# Patient Record
Sex: MALE | Race: WHITE | NOT HISPANIC OR LATINO | Employment: FULL TIME | URBAN - NONMETROPOLITAN AREA
[De-identification: names, ages, dates, MRNs, and addresses within clinical notes are randomized per-mention and may not be internally consistent; named-entity substitution may affect disease eponyms.]

---

## 2023-01-30 PROBLEM — M77.12 LATERAL EPICONDYLITIS OF LEFT ELBOW: Status: ACTIVE | Noted: 2023-01-30

## 2023-01-30 PROBLEM — R53.83 FATIGUE: Status: ACTIVE | Noted: 2023-01-30

## 2023-01-30 PROBLEM — R13.10 DYSPHAGIA: Status: ACTIVE | Noted: 2023-01-30

## 2023-01-30 PROBLEM — E53.8 VITAMIN B12 DEFICIENCY: Status: ACTIVE | Noted: 2023-01-30

## 2023-01-30 PROBLEM — F32.1 DEPRESSION, MAJOR, SINGLE EPISODE, MODERATE (MULTI): Status: ACTIVE | Noted: 2023-01-30

## 2023-01-30 PROBLEM — R39.11 URINARY HESITANCY: Status: ACTIVE | Noted: 2023-01-30

## 2023-01-30 PROBLEM — E11.9 DM2 (DIABETES MELLITUS, TYPE 2) (MULTI): Status: ACTIVE | Noted: 2023-01-30

## 2023-01-30 PROBLEM — R71.8 HIGH MEAN CORPUSCULAR HEMOGLOBIN CONCENTRATION (MCHC): Status: ACTIVE | Noted: 2023-01-30

## 2023-01-30 PROBLEM — E83.51 HYPOCALCEMIA: Status: ACTIVE | Noted: 2023-01-30

## 2023-01-30 PROBLEM — Z86.0100 HISTORY OF COLON POLYPS: Status: ACTIVE | Noted: 2023-01-30

## 2023-01-30 PROBLEM — J43.9 EMPHYSEMA/COPD (MULTI): Status: ACTIVE | Noted: 2023-01-30

## 2023-01-30 PROBLEM — R63.4 UNEXPLAINED WEIGHT LOSS: Status: ACTIVE | Noted: 2023-01-30

## 2023-01-30 PROBLEM — N40.0 BPH (BENIGN PROSTATIC HYPERPLASIA): Status: ACTIVE | Noted: 2023-01-30

## 2023-01-30 PROBLEM — G47.19 EXCESSIVE DAYTIME SLEEPINESS: Status: ACTIVE | Noted: 2023-01-30

## 2023-01-30 PROBLEM — D17.0 LIPOMA OF SKIN AND SUBCUTANEOUS TISSUE OF NECK: Status: ACTIVE | Noted: 2023-01-30

## 2023-01-30 PROBLEM — Z86.010 HISTORY OF COLON POLYPS: Status: ACTIVE | Noted: 2023-01-30

## 2023-01-30 PROBLEM — E03.9 HYPOTHYROIDISM: Status: ACTIVE | Noted: 2023-01-30

## 2023-01-30 PROBLEM — I25.10 CAD IN NATIVE ARTERY: Status: ACTIVE | Noted: 2023-01-30

## 2023-01-30 PROBLEM — E11.69 MIXED HYPERLIPIDEMIA DUE TO TYPE 2 DIABETES MELLITUS (MULTI): Status: ACTIVE | Noted: 2023-01-30

## 2023-01-30 PROBLEM — K21.9 GERD (GASTROESOPHAGEAL REFLUX DISEASE): Status: ACTIVE | Noted: 2023-01-30

## 2023-01-30 PROBLEM — F41.9 ANXIETY: Status: ACTIVE | Noted: 2023-01-30

## 2023-01-30 PROBLEM — E61.1 LOW IRON: Status: ACTIVE | Noted: 2023-01-30

## 2023-01-30 PROBLEM — E55.9 VITAMIN D DEFICIENCY: Status: ACTIVE | Noted: 2023-01-30

## 2023-01-30 PROBLEM — E78.2 MIXED HYPERLIPIDEMIA DUE TO TYPE 2 DIABETES MELLITUS (MULTI): Status: ACTIVE | Noted: 2023-01-30

## 2023-01-30 RX ORDER — NITROGLYCERIN 0.4 MG/1
TABLET SUBLINGUAL
COMMUNITY
Start: 2020-07-22

## 2023-01-30 RX ORDER — MONTELUKAST SODIUM 10 MG/1
1 TABLET ORAL NIGHTLY
COMMUNITY
Start: 2021-04-20

## 2023-01-30 RX ORDER — LANOLIN ALCOHOL/MO/W.PET/CERES
1 CREAM (GRAM) TOPICAL DAILY
COMMUNITY
Start: 2020-02-25 | End: 2023-05-18 | Stop reason: SDUPTHER

## 2023-01-30 RX ORDER — ISOSORBIDE MONONITRATE 60 MG/1
TABLET, EXTENDED RELEASE ORAL
COMMUNITY
Start: 2020-08-04

## 2023-01-30 RX ORDER — CLOPIDOGREL BISULFATE 75 MG/1
1 TABLET ORAL DAILY
COMMUNITY
End: 2024-02-01 | Stop reason: SDUPTHER

## 2023-01-30 RX ORDER — ATORVASTATIN CALCIUM 40 MG/1
1 TABLET, FILM COATED ORAL NIGHTLY
COMMUNITY
Start: 2020-02-25 | End: 2023-03-16 | Stop reason: SDUPTHER

## 2023-01-30 RX ORDER — LEVOTHYROXINE SODIUM 25 UG/1
1 TABLET ORAL DAILY
COMMUNITY
Start: 2020-02-25 | End: 2024-02-01 | Stop reason: SDUPTHER

## 2023-01-30 RX ORDER — OMEPRAZOLE 40 MG/1
CAPSULE, DELAYED RELEASE ORAL
COMMUNITY
Start: 2021-04-20

## 2023-01-30 RX ORDER — ERGOCALCIFEROL 1.25 MG/1
1 CAPSULE ORAL
COMMUNITY
Start: 2020-02-18 | End: 2023-03-16 | Stop reason: SDUPTHER

## 2023-01-30 RX ORDER — BLOOD SUGAR DIAGNOSTIC
STRIP MISCELLANEOUS
COMMUNITY
Start: 2020-08-31

## 2023-01-30 RX ORDER — METFORMIN HYDROCHLORIDE 1000 MG/1
1 TABLET ORAL EVERY 12 HOURS
COMMUNITY
Start: 2020-02-03 | End: 2024-04-18 | Stop reason: SDUPTHER

## 2023-01-30 RX ORDER — FLUOXETINE HYDROCHLORIDE 40 MG/1
CAPSULE ORAL
COMMUNITY
End: 2023-04-11 | Stop reason: ALTCHOICE

## 2023-03-13 LAB
ALANINE AMINOTRANSFERASE (SGPT) (U/L) IN SER/PLAS: 11 U/L (ref 10–52)
ALBUMIN (G/DL) IN SER/PLAS: 4.1 G/DL (ref 3.4–5)
ALKALINE PHOSPHATASE (U/L) IN SER/PLAS: 79 U/L (ref 33–136)
ANION GAP IN SER/PLAS: 8 MMOL/L (ref 10–20)
ASPARTATE AMINOTRANSFERASE (SGOT) (U/L) IN SER/PLAS: 12 U/L (ref 9–39)
BASOPHILS (10*3/UL) IN BLOOD BY AUTOMATED COUNT: 0.04 X10E9/L (ref 0–0.1)
BASOPHILS/100 LEUKOCYTES IN BLOOD BY AUTOMATED COUNT: 0.6 % (ref 0–2)
BILIRUBIN TOTAL (MG/DL) IN SER/PLAS: 0.5 MG/DL (ref 0–1.2)
CALCIDIOL (25 OH VITAMIN D3) (NG/ML) IN SER/PLAS: 26 NG/ML
CALCIUM (MG/DL) IN SER/PLAS: 8.9 MG/DL (ref 8.6–10.3)
CARBON DIOXIDE, TOTAL (MMOL/L) IN SER/PLAS: 30 MMOL/L (ref 21–32)
CHLORIDE (MMOL/L) IN SER/PLAS: 105 MMOL/L (ref 98–107)
CHOLESTEROL (MG/DL) IN SER/PLAS: 178 MG/DL (ref 0–199)
CHOLESTEROL IN HDL (MG/DL) IN SER/PLAS: 38 MG/DL
CHOLESTEROL/HDL RATIO: 4.7
COBALAMIN (VITAMIN B12) (PG/ML) IN SER/PLAS: 921 PG/ML (ref 211–911)
CREATININE (MG/DL) IN SER/PLAS: 0.75 MG/DL (ref 0.5–1.3)
EOSINOPHILS (10*3/UL) IN BLOOD BY AUTOMATED COUNT: 0.1 X10E9/L (ref 0–0.7)
EOSINOPHILS/100 LEUKOCYTES IN BLOOD BY AUTOMATED COUNT: 1.6 % (ref 0–6)
ERYTHROCYTE DISTRIBUTION WIDTH (RATIO) BY AUTOMATED COUNT: 12.3 % (ref 11.5–14.5)
ERYTHROCYTE MEAN CORPUSCULAR HEMOGLOBIN CONCENTRATION (G/DL) BY AUTOMATED: 34 G/DL (ref 32–36)
ERYTHROCYTE MEAN CORPUSCULAR VOLUME (FL) BY AUTOMATED COUNT: 93 FL (ref 80–100)
ERYTHROCYTES (10*6/UL) IN BLOOD BY AUTOMATED COUNT: 4.72 X10E12/L (ref 4.5–5.9)
ESTIMATED AVERAGE GLUCOSE FOR HBA1C: 137 MG/DL
FERRITIN (UG/LL) IN SER/PLAS: 110 UG/L (ref 20–300)
GFR MALE: >90 ML/MIN/1.73M2
GLUCOSE (MG/DL) IN SER/PLAS: 134 MG/DL (ref 74–99)
HEMATOCRIT (%) IN BLOOD BY AUTOMATED COUNT: 43.8 % (ref 41–52)
HEMOGLOBIN (G/DL) IN BLOOD: 14.9 G/DL (ref 13.5–17.5)
HEMOGLOBIN A1C/HEMOGLOBIN TOTAL IN BLOOD: 6.4 %
IMMATURE GRANULOCYTES/100 LEUKOCYTES IN BLOOD BY AUTOMATED COUNT: 0.3 % (ref 0–0.9)
IRON (UG/DL) IN SER/PLAS: 80 UG/DL (ref 35–150)
IRON BINDING CAPACITY (UG/DL) IN SER/PLAS: 304 UG/DL (ref 240–445)
IRON SATURATION (%) IN SER/PLAS: 26 % (ref 25–45)
LDL: 122 MG/DL (ref 0–99)
LEUKOCYTES (10*3/UL) IN BLOOD BY AUTOMATED COUNT: 6.2 X10E9/L (ref 4.4–11.3)
LYMPHOCYTES (10*3/UL) IN BLOOD BY AUTOMATED COUNT: 2.03 X10E9/L (ref 1.2–4.8)
LYMPHOCYTES/100 LEUKOCYTES IN BLOOD BY AUTOMATED COUNT: 33 % (ref 13–44)
MONOCYTES (10*3/UL) IN BLOOD BY AUTOMATED COUNT: 0.3 X10E9/L (ref 0.1–1)
MONOCYTES/100 LEUKOCYTES IN BLOOD BY AUTOMATED COUNT: 4.9 % (ref 2–10)
NEUTROPHILS (10*3/UL) IN BLOOD BY AUTOMATED COUNT: 3.67 X10E9/L (ref 1.2–7.7)
NEUTROPHILS/100 LEUKOCYTES IN BLOOD BY AUTOMATED COUNT: 59.6 % (ref 40–80)
PLATELETS (10*3/UL) IN BLOOD AUTOMATED COUNT: 305 X10E9/L (ref 150–450)
POTASSIUM (MMOL/L) IN SER/PLAS: 4 MMOL/L (ref 3.5–5.3)
PROSTATE SPECIFIC ANTIGEN,SCREEN: 2.49 NG/ML (ref 0–4)
PROTEIN TOTAL: 6 G/DL (ref 6.4–8.2)
SODIUM (MMOL/L) IN SER/PLAS: 139 MMOL/L (ref 136–145)
THYROTROPIN (MIU/L) IN SER/PLAS BY DETECTION LIMIT <= 0.05 MIU/L: 3.31 MIU/L (ref 0.44–3.98)
TRIGLYCERIDE (MG/DL) IN SER/PLAS: 92 MG/DL (ref 0–149)
UREA NITROGEN (MG/DL) IN SER/PLAS: 14 MG/DL (ref 6–23)
VLDL: 18 MG/DL (ref 0–40)

## 2023-03-16 ENCOUNTER — OFFICE VISIT (OUTPATIENT)
Dept: PRIMARY CARE | Facility: CLINIC | Age: 63
End: 2023-03-16
Payer: MEDICAID

## 2023-03-16 VITALS
SYSTOLIC BLOOD PRESSURE: 118 MMHG | BODY MASS INDEX: 26.81 KG/M2 | WEIGHT: 181 LBS | HEIGHT: 69 IN | HEART RATE: 76 BPM | DIASTOLIC BLOOD PRESSURE: 64 MMHG

## 2023-03-16 DIAGNOSIS — E61.1 LOW IRON: ICD-10-CM

## 2023-03-16 DIAGNOSIS — E03.9 HYPOTHYROIDISM, UNSPECIFIED TYPE: ICD-10-CM

## 2023-03-16 DIAGNOSIS — E11.69 MIXED HYPERLIPIDEMIA DUE TO TYPE 2 DIABETES MELLITUS (MULTI): ICD-10-CM

## 2023-03-16 DIAGNOSIS — E53.8 VITAMIN B12 DEFICIENCY: ICD-10-CM

## 2023-03-16 DIAGNOSIS — E55.9 VITAMIN D DEFICIENCY: ICD-10-CM

## 2023-03-16 DIAGNOSIS — E78.2 MIXED HYPERLIPIDEMIA DUE TO TYPE 2 DIABETES MELLITUS (MULTI): ICD-10-CM

## 2023-03-16 DIAGNOSIS — E83.51 HYPOCALCEMIA: ICD-10-CM

## 2023-03-16 DIAGNOSIS — F32.1 DEPRESSION, MAJOR, SINGLE EPISODE, MODERATE (MULTI): Primary | ICD-10-CM

## 2023-03-16 DIAGNOSIS — J43.9 PULMONARY EMPHYSEMA, UNSPECIFIED EMPHYSEMA TYPE (MULTI): ICD-10-CM

## 2023-03-16 DIAGNOSIS — F41.9 ANXIETY: ICD-10-CM

## 2023-03-16 PROCEDURE — 3044F HG A1C LEVEL LT 7.0%: CPT | Performed by: NURSE PRACTITIONER

## 2023-03-16 PROCEDURE — 4004F PT TOBACCO SCREEN RCVD TLK: CPT | Performed by: NURSE PRACTITIONER

## 2023-03-16 PROCEDURE — 3078F DIAST BP <80 MM HG: CPT | Performed by: NURSE PRACTITIONER

## 2023-03-16 PROCEDURE — 3074F SYST BP LT 130 MM HG: CPT | Performed by: NURSE PRACTITIONER

## 2023-03-16 PROCEDURE — 99214 OFFICE O/P EST MOD 30 MIN: CPT | Performed by: NURSE PRACTITIONER

## 2023-03-16 RX ORDER — ARIPIPRAZOLE 10 MG/1
10 TABLET ORAL DAILY
Qty: 90 TABLET | Refills: 0 | Status: SHIPPED | OUTPATIENT
Start: 2023-03-16 | End: 2023-04-11 | Stop reason: ALTCHOICE

## 2023-03-16 RX ORDER — ARIPIPRAZOLE 5 MG/1
1 TABLET ORAL DAILY
COMMUNITY
Start: 2023-02-02 | End: 2023-04-11 | Stop reason: ALTCHOICE

## 2023-03-16 RX ORDER — ATORVASTATIN CALCIUM 40 MG/1
40 TABLET, FILM COATED ORAL NIGHTLY
Qty: 90 TABLET | Refills: 3 | Status: SHIPPED | OUTPATIENT
Start: 2023-03-16 | End: 2024-02-01 | Stop reason: SDUPTHER

## 2023-03-16 RX ORDER — ERGOCALCIFEROL 1.25 MG/1
1 CAPSULE ORAL
Qty: 13 CAPSULE | Refills: 3 | Status: SHIPPED | OUTPATIENT
Start: 2023-03-16 | End: 2024-02-01 | Stop reason: SDUPTHER

## 2023-03-16 ASSESSMENT — ENCOUNTER SYMPTOMS
HEADACHES: 0
MYALGIAS: 0
SEIZURES: 0
JOINT SWELLING: 0
FLANK PAIN: 0
LIGHT-HEADEDNESS: 0
NERVOUS/ANXIOUS: 0
HEMATURIA: 0
BLOOD IN STOOL: 0
SPEECH DIFFICULTY: 0
UNEXPECTED WEIGHT CHANGE: 0
CONSTIPATION: 0
SLEEP DISTURBANCE: 0
ABDOMINAL DISTENTION: 0
TROUBLE SWALLOWING: 0
EYE PAIN: 0
DIARRHEA: 0
CHOKING: 0
FREQUENCY: 0
WEAKNESS: 0
DIFFICULTY URINATING: 0
WOUND: 0
DIZZINESS: 0
VOMITING: 0
NAUSEA: 0
BACK PAIN: 0
SHORTNESS OF BREATH: 0
NECK PAIN: 0
FEVER: 0
CHEST TIGHTNESS: 0
DYSURIA: 0
PHOTOPHOBIA: 0
APNEA: 0
NUMBNESS: 0
FATIGUE: 0
CONFUSION: 0
ABDOMINAL PAIN: 0
PALPITATIONS: 0
COUGH: 0
WHEEZING: 0
ARTHRALGIAS: 0
EYE REDNESS: 0
SORE THROAT: 0
FACIAL ASYMMETRY: 0
CHILLS: 0

## 2023-03-16 ASSESSMENT — PATIENT HEALTH QUESTIONNAIRE - PHQ9
8. MOVING OR SPEAKING SO SLOWLY THAT OTHER PEOPLE COULD HAVE NOTICED. OR THE OPPOSITE, BEING SO FIGETY OR RESTLESS THAT YOU HAVE BEEN MOVING AROUND A LOT MORE THAN USUAL: NEARLY EVERY DAY
2. FEELING DOWN, DEPRESSED OR HOPELESS: NEARLY EVERY DAY
7. TROUBLE CONCENTRATING ON THINGS, SUCH AS READING THE NEWSPAPER OR WATCHING TELEVISION: SEVERAL DAYS
6. FEELING BAD ABOUT YOURSELF - OR THAT YOU ARE A FAILURE OR HAVE LET YOURSELF OR YOUR FAMILY DOWN: SEVERAL DAYS
10. IF YOU CHECKED OFF ANY PROBLEMS, HOW DIFFICULT HAVE THESE PROBLEMS MADE IT FOR YOU TO DO YOUR WORK, TAKE CARE OF THINGS AT HOME, OR GET ALONG WITH OTHER PEOPLE: SOMEWHAT DIFFICULT
SUM OF ALL RESPONSES TO PHQ QUESTIONS 1-9: 11
4. FEELING TIRED OR HAVING LITTLE ENERGY: NOT AT ALL
9. THOUGHTS THAT YOU WOULD BE BETTER OFF DEAD, OR OF HURTING YOURSELF: NOT AT ALL
3. TROUBLE FALLING OR STAYING ASLEEP OR SLEEPING TOO MUCH: NOT AT ALL
5. POOR APPETITE OR OVEREATING: NOT AT ALL
1. LITTLE INTEREST OR PLEASURE IN DOING THINGS: NEARLY EVERY DAY
SUM OF ALL RESPONSES TO PHQ9 QUESTIONS 1 AND 2: 6

## 2023-03-16 NOTE — PROGRESS NOTES
"Subjective   Patient ID: Naveed Brown is a 62 y.o. male who presents for Follow-up (12 MONTH F/U WITH LABS. C/O INCREASED DEPRESSION - NO MOTIVATION TO DO ANYTHING. 22# WEIGHT LOSS SINCE 2/2/23 VISIT BUT PATIENT CLAIMS HE HAS BEEN EATING WELL. ).  HPI:  Presents today for [facility] LABS AND MED CHECK OF ABILIFY. HE IS DOING WELL ON MED AND DENIES SIDE EFFECTS. HE DOES FEEL LIKE HE COULD BENEFIT FROM A DOSAGE INCREASE. NO NEW COMPLAINTS    WT- FROM LAST VISIT THERE IS A 20LB WT DIFFERENCE BUT HE DENIES ANY KNOWN WT LOSS. INSTRUCTED TO MONITOR WT AT HOME AND RETURN SOONER IF HE IS LOSING WEIGHT  DM- HGA1C  6.4% COMPARED TO 6.2%. DENIES ANY SYMPTOMS OF HYPOGLYCEMIA. HAS NOT HAD THEIR EYE CHECKUP. DENIES ANY NEUROPATHY OR DIABETIC ULCER SYMPTOMS. WILL RECHECK IN 6 MONTHS  THYROID- STABLE  IRON- STABLE  CA+- STABLE  VIT D- LOW BUT STATES HE HAS MISSED SOME DOSES. MED SENT      Visit Vitals  /64   Pulse 76   Ht 1.753 m (5' 9\")   Wt 82.1 kg (181 lb)   BMI 26.73 kg/m²   Smoking Status Every Day   BSA 2 m²        Review of Systems   Constitutional:  Negative for chills, fatigue, fever and unexpected weight change.   HENT:  Negative for congestion, ear pain, sore throat and trouble swallowing.    Eyes:  Negative for photophobia, pain, redness and visual disturbance.   Respiratory:  Negative for apnea, cough, choking, chest tightness, shortness of breath and wheezing.    Cardiovascular:  Negative for chest pain, palpitations and leg swelling.   Gastrointestinal:  Negative for abdominal distention, abdominal pain, blood in stool, constipation, diarrhea, nausea and vomiting.   Genitourinary:  Negative for difficulty urinating, dysuria, flank pain, frequency, hematuria and urgency.   Musculoskeletal:  Negative for arthralgias, back pain, gait problem, joint swelling, myalgias and neck pain.   Skin:  Negative for rash and wound.   Neurological:  Negative for dizziness, seizures, syncope, facial asymmetry, speech difficulty, " weakness, light-headedness, numbness and headaches.   Psychiatric/Behavioral:  Negative for confusion, sleep disturbance and suicidal ideas. The patient is not nervous/anxious.        Objective   Component      Latest Ref Rng 3/13/2023   WBC      4.4 - 11.3 x10E9/L 6.2    RBC      4.50 - 5.90 x10E12/L 4.72    HEMOGLOBIN      13.5 - 17.5 g/dL 14.9    HEMATOCRIT      41.0 - 52.0 % 43.8    MCV      80 - 100 fL 93    MCHC      32.0 - 36.0 g/dL 34.0    Platelets      150 - 450 x10E9/L 305    RED CELL DISTRIBUTION WIDTH      11.5 - 14.5 % 12.3    Neutrophils %      40.0 - 80.0 % 59.6    Immature Granulocytes %, Automated      0.0 - 0.9 % 0.3    Lymphocytes %      13.0 - 44.0 % 33.0    Monocytes %      2.0 - 10.0 % 4.9    Eosinophils %      0.0 - 6.0 % 1.6    Basophils %      0.0 - 2.0 % 0.6    Neutrophils Absolute      1.20 - 7.70 x10E9/L 3.67    Lymphocytes Absolute      1.20 - 4.80 x10E9/L 2.03    Monocytes Absolute      0.10 - 1.00 x10E9/L 0.30    Eosinophils Absolute      0.00 - 0.70 x10E9/L 0.10    Basophils Absolute      0.00 - 0.10 x10E9/L 0.04    GLUCOSE      74 - 99 mg/dL 134 (H)    SODIUM      136 - 145 mmol/L 139    POTASSIUM      3.5 - 5.3 mmol/L 4.0    CHLORIDE      98 - 107 mmol/L 105    Bicarbonate      21 - 32 mmol/L 30    Anion Gap      10 - 20 mmol/L 8 (L)    Blood Urea Nitrogen      6 - 23 mg/dL 14    Creatinine      0.50 - 1.30 mg/dL 0.75    GFR MALE      >90 mL/min/1.73m2 >90    Calcium      8.6 - 10.3 mg/dL 8.9    Albumin      3.4 - 5.0 g/dL 4.1    Alkaline Phosphatase      33 - 136 U/L 79    Total Protein      6.4 - 8.2 g/dL 6.0 (L)    AST      9 - 39 U/L 12    Bilirubin Total      0.0 - 1.2 mg/dL 0.5    ALT      10 - 52 U/L 11    CHOLESTEROL      0 - 199 mg/dL 178    HDL CHOLESTEROL      mg/dL 38.0 !    Cholesterol/HDL Ratio 4.7    LDL      0 - 99 mg/dL 122 (H)    VLDL      0 - 40 mg/dL 18    TRIGLYCERIDES      0 - 149 mg/dL 92    IRON      35 - 150 ug/dL 80    TIBC      240 - 445 ug/dL 304    %  Saturation      25 - 45 % 26    Hemoglobin A1C      % 6.4 !    Estimated Average Glucose      MG/    FERRITIN      20 - 300 ug/L 110    Prostate Specific Antigen,Screen      0.00 - 4.00 ng/mL 2.49    Thyroid Stimulating Hormone      0.44 - 3.98 mIU/L 3.31    Vitamin D, 25-Hydroxy, Total      ng/mL 26 !    Vitamin B12      211 - 911 pg/mL 921 (H)       Legend:  (H) High  (L) Low  ! Abnormal  Physical Exam  Constitutional:       Appearance: Normal appearance. He is normal weight.   HENT:      Head: Normocephalic.   Eyes:      Extraocular Movements: Extraocular movements intact.      Conjunctiva/sclera: Conjunctivae normal.      Pupils: Pupils are equal, round, and reactive to light.   Cardiovascular:      Rate and Rhythm: Normal rate and regular rhythm.      Pulses: Normal pulses.      Heart sounds: Normal heart sounds.   Pulmonary:      Effort: Pulmonary effort is normal.      Breath sounds: Normal breath sounds.   Musculoskeletal:         General: Normal range of motion.      Cervical back: Normal range of motion.   Skin:     General: Skin is warm and dry.   Neurological:      General: No focal deficit present.      Mental Status: He is alert and oriented to person, place, and time.   Psychiatric:         Mood and Affect: Mood normal.         Behavior: Behavior normal.         Thought Content: Thought content normal.         Judgment: Judgment normal.            Assessment/Plan   Problem List Items Addressed This Visit          Endocrine/Metabolic    Mixed hyperlipidemia due to type 2 diabetes mellitus (CMS/HCC)    Relevant Medications    atorvastatin (Lipitor) 40 mg tablet    Vitamin D deficiency    Relevant Medications    ergocalciferol (Vitamin D-2) 1.25 MG (20612 UT) capsule       Other    Depression, major, single episode, moderate (CMS/HCC) - Primary    Relevant Medications    ARIPiprazole (Abilify) 10 mg tablet     INCREASE ABILIFY TO 10 MG     WE DISCUSSED MOST COMMON SIDE EFFECTS OF PRESCRIBED  MEDICATIONS. INDICATIONS, RISK, COMPLICATIONS, AND ALTERNATIVES OF MEDICATION/THERAPEUTICS WERE EXPLAINED AND DISCUSSED. PLEASE MONITOR CLOSELY FOR ANY UNTOWARD SIDE EFFECTS OR COMPLICATIONS OF MEDICATIONS. PATIENT IS STRONGLY ADVISED TO BE COMPLIANT WITH RECOMMENDATIONS. QUESTIONS AND CONCERNS WERE ADDRESSED. INSTRUCTED TO CALL, RETURN SOONER, OR GO TO THE ER,  IF SYMPTOMS PERSIST OR WORSEN. THEY VOICED UNDERSTANDING AND  DENIES FURTHER QUESTIONS AT THIS TIME.    TIME CODE  1. PREPARATION FOR PATIENT'S VISIT (REVIEWING CHART, CURRENT MEDICAL RECORDS, OUTSIDE HEALTH PROVIDER RECORDS, PREVIOUS HISTORY, EXAM, TEST, PROCEDURE, AND MEDICATIONS)  2. FACE TO FACE ENCOUNTER OBTAINING HISTORY FROM THE PATIENT/FAMILY/CAREGIVERS; PERFORMING EVALUATION AND EXAMINATION; ORDERING TESTS OR PROCEDURES; REFERRING AND COMMUNICATING WITH OTHER HEALTHCARE PROVIDERS; COUNSELING AND EDUCATION OF THE PATIENT/FAMILY/CAREGIVERS; INDEPENDENTLY INTERPRETING RESULTS (TESTS, LABS, PROCEDURES, IMAGING) AND COMMUNICATING AND EXPLAINING RESULTS TO THE PATIENT/FAMILY/CAREGIVERS  3. COORDINATION OF CARE; PREPARING AND PRINTING DISCHARGE INSTRUCTIONS AND ANY EDUCATIONAL MATERIAL FOR THE PATIENT/FAMILY/CAREGIVERS. DOCUMENTING CLINICAL INFORMATION IN THE ELECTRONIC MEDICAL RECORD   4. REVIEWING OARRS AS NEEDED    MDM  1) COMPLEXITY: MORE THAN 1 STABLE CHRONIC CONDITION ADDRESSED OR 1 ACUTE ILLNESS ADDRESSED   2)DATA: TESTS INTERPRETED AND OR ORDERED, TOOK INDEPENDENT HISTORY OR RECORDS REVIEWED  3)RISK: MODERATE RISK DUE TO NATURE OF MEDICAL CONDITIONS/COMORBIDITY OR MEDICATIONS ORDERED OR SURGICAL OR PROCEDURE REFERRAL    1 MONTH MED CHECK VIRTUAL

## 2023-04-03 ENCOUNTER — TELEPHONE (OUTPATIENT)
Dept: PRIMARY CARE | Facility: CLINIC | Age: 63
End: 2023-04-03
Payer: MEDICAID

## 2023-04-04 NOTE — TELEPHONE ENCOUNTER
Reason for Disposition  • Ear congestion present > 48 hours    Protocols used: EAR - CONGESTION-P-AH     PATIENT DOESN'T HAVE HIM LISTED FOR HIPAA CONTACT

## 2023-04-11 DIAGNOSIS — F32.1 DEPRESSION, MAJOR, SINGLE EPISODE, MODERATE (MULTI): Primary | ICD-10-CM

## 2023-04-11 RX ORDER — FLUOXETINE HYDROCHLORIDE 20 MG/1
20 CAPSULE ORAL DAILY
Qty: 90 CAPSULE | Refills: 3 | Status: SHIPPED | OUTPATIENT
Start: 2023-04-11 | End: 2023-07-20 | Stop reason: SDUPTHER

## 2023-04-11 RX ORDER — ARIPIPRAZOLE 10 MG/1
10 TABLET ORAL DAILY
Qty: 90 TABLET | Refills: 1 | Status: SHIPPED | OUTPATIENT
Start: 2023-04-11 | End: 2023-05-02 | Stop reason: ALTCHOICE

## 2023-04-14 ENCOUNTER — TELEMEDICINE (OUTPATIENT)
Dept: PRIMARY CARE | Facility: CLINIC | Age: 63
End: 2023-04-14
Payer: MEDICAID

## 2023-04-14 DIAGNOSIS — F41.9 ANXIETY: ICD-10-CM

## 2023-04-14 DIAGNOSIS — F32.1 DEPRESSION, MAJOR, SINGLE EPISODE, MODERATE (MULTI): Primary | ICD-10-CM

## 2023-04-14 PROCEDURE — 99212 OFFICE O/P EST SF 10 MIN: CPT | Performed by: NURSE PRACTITIONER

## 2023-04-14 ASSESSMENT — ENCOUNTER SYMPTOMS
SEIZURES: 0
ABDOMINAL DISTENTION: 0
NAUSEA: 0
SHORTNESS OF BREATH: 0
TROUBLE SWALLOWING: 0
CONSTIPATION: 0
SLEEP DISTURBANCE: 0
NERVOUS/ANXIOUS: 0
WOUND: 0
MYALGIAS: 0
EYE PAIN: 0
ARTHRALGIAS: 0
FLANK PAIN: 0
DIZZINESS: 0
WEAKNESS: 0
DYSURIA: 0
COUGH: 0
HEADACHES: 0
FREQUENCY: 0
CHILLS: 0
SPEECH DIFFICULTY: 0
WHEEZING: 0
FEVER: 0
FACIAL ASYMMETRY: 0
PALPITATIONS: 0
APNEA: 0
DIFFICULTY URINATING: 0
CHEST TIGHTNESS: 0
FATIGUE: 0
BACK PAIN: 0
VOMITING: 0
PHOTOPHOBIA: 0
EYE REDNESS: 0
UNEXPECTED WEIGHT CHANGE: 0
NECK PAIN: 0
HEMATURIA: 0
CONFUSION: 0
JOINT SWELLING: 0
NUMBNESS: 0
SORE THROAT: 0
DIARRHEA: 0
CHOKING: 0
BLOOD IN STOOL: 0
ABDOMINAL PAIN: 0

## 2023-04-14 NOTE — PROGRESS NOTES
Subjective   Patient ID: Naveed Brown is a 62 y.o. male who presents for Follow-up (TELEPHONE VISIT - 1 MONTH F/U MED CHECK DEPRESSION. PATIENT RECENTLY HAD MEDS ADJUSTED AND HAS ONLY BEEN TAKING X 2 DAYS NOW BUT STILL HASN'T NOTICED MUCH IMPROVEMENT REGARDING MOOD. ).    TELEPHONE  APPOINTMENT BEING PERFORMED DUE TO COVID-19 (CORONAVIRUS)    HPI:  Presents today for MED CHECK. HE JUST STARTED PROZAC 20 MG 2 DAYS. HE IS DOING WELL ON ABILIFY. DENIES SUICIDAL IDEATION. NO NEW COMPLAINTS         Visit Vitals  Smoking Status Every Day        Review of Systems   Constitutional:  Negative for chills, fatigue, fever and unexpected weight change.   HENT:  Negative for congestion, ear pain, sore throat and trouble swallowing.    Eyes:  Negative for photophobia, pain, redness and visual disturbance.   Respiratory:  Negative for apnea, cough, choking, chest tightness, shortness of breath and wheezing.    Cardiovascular:  Negative for chest pain, palpitations and leg swelling.   Gastrointestinal:  Negative for abdominal distention, abdominal pain, blood in stool, constipation, diarrhea, nausea and vomiting.   Genitourinary:  Negative for difficulty urinating, dysuria, flank pain, frequency, hematuria and urgency.   Musculoskeletal:  Negative for arthralgias, back pain, gait problem, joint swelling, myalgias and neck pain.   Skin:  Negative for rash and wound.   Neurological:  Negative for dizziness, seizures, syncope, facial asymmetry, speech difficulty, weakness, numbness and headaches.   Psychiatric/Behavioral:  Negative for confusion, sleep disturbance and suicidal ideas. The patient is not nervous/anxious.        Objective     Physical Exam  Psychiatric:         Mood and Affect: Mood normal.         Thought Content: Thought content normal.         Judgment: Judgment normal.            Assessment/Plan   Problem List Items Addressed This Visit          Other    Anxiety    Depression, major, single episode, moderate  (CMS/[facility]) - Primary     REFUSING SOONER FU APPT FOR MED CHECK. INSTRUCTED TO CALL IN 3 WEEKS WITH UPDATE    WE DISCUSSED MOST COMMON SIDE EFFECTS OF PRESCRIBED MEDICATIONS. INDICATIONS, RISK, COMPLICATIONS, AND ALTERNATIVES OF MEDICATION/THERAPEUTICS WERE EXPLAINED AND DISCUSSED. PLEASE MONITOR CLOSELY FOR ANY UNTOWARD SIDE EFFECTS OR COMPLICATIONS OF MEDICATIONS. PATIENT IS STRONGLY ADVISED TO BE COMPLIANT WITH RECOMMENDATIONS. QUESTIONS AND CONCERNS WERE ADDRESSED. INSTRUCTED TO CALL, RETURN SOONER, OR GO TO THE ER,  IF SYMPTOMS PERSIST OR WORSEN. THEY VOICED UNDERSTANDING AND  DENIES FURTHER QUESTIONS AT THIS TIME.    TIME CODE  1. PREPARATION FOR PATIENT'S VISIT (REVIEWING CHART, CURRENT MEDICAL RECORDS, OUTSIDE HEALTH PROVIDER RECORDS, PREVIOUS HISTORY, EXAM, TEST, PROCEDURE, AND MEDICATIONS)  2. FACE TO FACE ENCOUNTER OBTAINING HISTORY FROM THE PATIENT/FAMILY/CAREGIVERS; PERFORMING EVALUATION AND EXAMINATION; ORDERING TESTS OR PROCEDURES; REFERRING AND COMMUNICATING WITH OTHER HEALTHCARE PROVIDERS; COUNSELING AND EDUCATION OF THE PATIENT/FAMILY/CAREGIVERS; INDEPENDENTLY INTERPRETING RESULTS (TESTS, LABS, PROCEDURES, IMAGING) AND COMMUNICATING AND EXPLAINING RESULTS TO THE PATIENT/FAMILY/CAREGIVERS  3. COORDINATION OF CARE; PREPARING AND PRINTING DISCHARGE INSTRUCTIONS AND ANY EDUCATIONAL MATERIAL FOR THE PATIENT/FAMILY/CAREGIVERS. DOCUMENTING CLINICAL INFORMATION IN THE ELECTRONIC MEDICAL RECORD   4. REVIEWING OARRS AS NEEDED    MDM  1) COMPLEXITY: MORE THAN 1 STABLE CHRONIC CONDITION ADDRESSED OR 1 ACUTE ILLNESS ADDRESSED   2)DATA: TESTS INTERPRETED AND OR ORDERED, TOOK INDEPENDENT HISTORY OR RECORDS REVIEWED  3)RISK: MODERATE RISK DUE TO NATURE OF MEDICAL CONDITIONS/COMORBIDITY OR MEDICATIONS ORDERED OR SURGICAL OR PROCEDURE REFERRAL    6 MONTHS WITH LABS

## 2023-05-02 DIAGNOSIS — F32.1 DEPRESSION, MAJOR, SINGLE EPISODE, MODERATE (MULTI): Primary | ICD-10-CM

## 2023-05-02 RX ORDER — ARIPIPRAZOLE 20 MG/1
20 TABLET ORAL DAILY
Qty: 90 TABLET | Refills: 1 | Status: SHIPPED | OUTPATIENT
Start: 2023-05-02 | End: 2023-05-18 | Stop reason: ALTCHOICE

## 2023-05-04 ENCOUNTER — OFFICE VISIT (OUTPATIENT)
Dept: PRIMARY CARE | Facility: CLINIC | Age: 63
End: 2023-05-04
Payer: MEDICAID

## 2023-05-04 VITALS
HEIGHT: 69 IN | DIASTOLIC BLOOD PRESSURE: 75 MMHG | SYSTOLIC BLOOD PRESSURE: 120 MMHG | BODY MASS INDEX: 26.36 KG/M2 | HEART RATE: 61 BPM | WEIGHT: 178 LBS

## 2023-05-04 DIAGNOSIS — F32.1 DEPRESSION, MAJOR, SINGLE EPISODE, MODERATE (MULTI): Primary | ICD-10-CM

## 2023-05-04 DIAGNOSIS — F41.9 ANXIETY: ICD-10-CM

## 2023-05-04 PROCEDURE — 3074F SYST BP LT 130 MM HG: CPT | Performed by: NURSE PRACTITIONER

## 2023-05-04 PROCEDURE — 3078F DIAST BP <80 MM HG: CPT | Performed by: NURSE PRACTITIONER

## 2023-05-04 PROCEDURE — 3044F HG A1C LEVEL LT 7.0%: CPT | Performed by: NURSE PRACTITIONER

## 2023-05-04 PROCEDURE — 99213 OFFICE O/P EST LOW 20 MIN: CPT | Performed by: NURSE PRACTITIONER

## 2023-05-04 PROCEDURE — 4004F PT TOBACCO SCREEN RCVD TLK: CPT | Performed by: NURSE PRACTITIONER

## 2023-05-04 ASSESSMENT — ENCOUNTER SYMPTOMS
FACIAL ASYMMETRY: 0
HEMATURIA: 0
SEIZURES: 0
DIARRHEA: 0
NUMBNESS: 0
PHOTOPHOBIA: 0
WHEEZING: 0
VOMITING: 0
WOUND: 0
EYE REDNESS: 0
SPEECH DIFFICULTY: 0
SORE THROAT: 0
ABDOMINAL DISTENTION: 0
DIFFICULTY URINATING: 0
JOINT SWELLING: 0
CHOKING: 0
WEAKNESS: 0
BLOOD IN STOOL: 0
NECK PAIN: 0
UNEXPECTED WEIGHT CHANGE: 0
EYE PAIN: 0
COUGH: 0
NERVOUS/ANXIOUS: 0
SLEEP DISTURBANCE: 0
DIZZINESS: 0
TROUBLE SWALLOWING: 0
FATIGUE: 0
CONFUSION: 0
PALPITATIONS: 0
MYALGIAS: 0
ABDOMINAL PAIN: 0
CONSTIPATION: 0
APNEA: 0
FLANK PAIN: 0
CHEST TIGHTNESS: 0
FREQUENCY: 0
SHORTNESS OF BREATH: 0
CHILLS: 0
DYSURIA: 0
BACK PAIN: 0
FEVER: 0
NAUSEA: 0
HEADACHES: 0
ARTHRALGIAS: 0

## 2023-05-04 ASSESSMENT — PATIENT HEALTH QUESTIONNAIRE - PHQ9
3. TROUBLE FALLING OR STAYING ASLEEP OR SLEEPING TOO MUCH: NOT AT ALL
8. MOVING OR SPEAKING SO SLOWLY THAT OTHER PEOPLE COULD HAVE NOTICED. OR THE OPPOSITE, BEING SO FIGETY OR RESTLESS THAT YOU HAVE BEEN MOVING AROUND A LOT MORE THAN USUAL: NEARLY EVERY DAY
SUM OF ALL RESPONSES TO PHQ9 QUESTIONS 1 AND 2: 6
7. TROUBLE CONCENTRATING ON THINGS, SUCH AS READING THE NEWSPAPER OR WATCHING TELEVISION: NEARLY EVERY DAY
4. FEELING TIRED OR HAVING LITTLE ENERGY: NEARLY EVERY DAY
9. THOUGHTS THAT YOU WOULD BE BETTER OFF DEAD, OR OF HURTING YOURSELF: NEARLY EVERY DAY
6. FEELING BAD ABOUT YOURSELF - OR THAT YOU ARE A FAILURE OR HAVE LET YOURSELF OR YOUR FAMILY DOWN: MORE THAN HALF THE DAYS
2. FEELING DOWN, DEPRESSED OR HOPELESS: NEARLY EVERY DAY
10. IF YOU CHECKED OFF ANY PROBLEMS, HOW DIFFICULT HAVE THESE PROBLEMS MADE IT FOR YOU TO DO YOUR WORK, TAKE CARE OF THINGS AT HOME, OR GET ALONG WITH OTHER PEOPLE: EXTREMELY DIFFICULT
5. POOR APPETITE OR OVEREATING: NOT AT ALL
SUM OF ALL RESPONSES TO PHQ QUESTIONS 1-9: 20
1. LITTLE INTEREST OR PLEASURE IN DOING THINGS: NEARLY EVERY DAY

## 2023-05-04 NOTE — PROGRESS NOTES
"Subjective   Patient ID: Naveed Brown is a 62 y.o. male who presents for Follow-up (DISCUSS TESTING).      HPI:  Presents today TO DISCUSS A MRI BRAIN.  C/O DEPRESSION AND MOOD CHANGES THAT REMAINS X 1 YEAR  modifying factors consists of NONE associated symptoms consist of NO HA, DIZZINESS OR CHEST PAIN.  prior treatment consists of medication ABILIFY 20 MG X 2 DAYS AND PROZAC 20 MG X 3 WEEKS    Visit Vitals  /75   Pulse 61   Ht 1.753 m (5' 9\")   Wt 80.7 kg (178 lb)   BMI 26.29 kg/m²   Smoking Status Every Day   BSA 1.98 m²        Review of Systems   Constitutional:  Negative for chills, fatigue, fever and unexpected weight change.   HENT:  Negative for congestion, ear pain, sore throat and trouble swallowing.    Eyes:  Negative for photophobia, pain, redness and visual disturbance.   Respiratory:  Negative for apnea, cough, choking, chest tightness, shortness of breath and wheezing.    Cardiovascular:  Negative for chest pain, palpitations and leg swelling.   Gastrointestinal:  Negative for abdominal distention, abdominal pain, blood in stool, constipation, diarrhea, nausea and vomiting.   Genitourinary:  Negative for difficulty urinating, dysuria, flank pain, frequency, hematuria and urgency.   Musculoskeletal:  Negative for arthralgias, back pain, gait problem, joint swelling, myalgias and neck pain.   Skin:  Negative for rash and wound.   Neurological:  Negative for dizziness, seizures, syncope, facial asymmetry, speech difficulty, weakness, numbness and headaches.   Psychiatric/Behavioral:  Negative for confusion, sleep disturbance and suicidal ideas. The patient is not nervous/anxious.        Objective     Physical Exam  Constitutional:       Appearance: Normal appearance. He is normal weight.   HENT:      Head: Normocephalic.   Eyes:      Extraocular Movements: Extraocular movements intact.      Conjunctiva/sclera: Conjunctivae normal.      Pupils: Pupils are equal, round, and reactive to light. "   Cardiovascular:      Rate and Rhythm: Normal rate and regular rhythm.      Pulses: Normal pulses.      Heart sounds: Normal heart sounds.   Pulmonary:      Effort: Pulmonary effort is normal.      Breath sounds: Normal breath sounds.   Musculoskeletal:         General: Normal range of motion.      Cervical back: Normal range of motion.   Skin:     General: Skin is warm and dry.   Neurological:      General: No focal deficit present.      Mental Status: He is alert and oriented to person, place, and time.   Psychiatric:         Mood and Affect: Mood normal.         Behavior: Behavior normal.         Thought Content: Thought content normal.         Judgment: Judgment normal.            Assessment/Plan   Problem List Items Addressed This Visit          Other    Anxiety    Depression, major, single episode, moderate (CMS/HCC) - Primary       HE HAS HAD SOME IMPROVEMENT IN THE PAST 2 DAYS SINCE INCREASING HIS ABILIFY TO 20 MG. HE WANTS TO EAIT TO ORDER MRI BRAIN AT THIS TIME SINCE THERE IS IMPROVEMENT NOTED.     WE DISCUSSED MOST COMMON SIDE EFFECTS OF PRESCRIBED MEDICATIONS. INDICATIONS, RISK, COMPLICATIONS, AND ALTERNATIVES OF MEDICATION/THERAPEUTICS WERE EXPLAINED AND DISCUSSED. PLEASE MONITOR CLOSELY FOR ANY UNTOWARD SIDE EFFECTS OR COMPLICATIONS OF MEDICATIONS. PATIENT IS STRONGLY ADVISED TO BE COMPLIANT WITH RECOMMENDATIONS. QUESTIONS AND CONCERNS WERE ADDRESSED. INSTRUCTED TO CALL, RETURN SOONER, OR GO TO THE ER,  IF SYMPTOMS PERSIST OR WORSEN. THEY VOICED UNDERSTANDING AND  DENIES FURTHER QUESTIONS AT THIS TIME.    TIME CODE  1. PREPARATION FOR PATIENT'S VISIT (REVIEWING CHART, CURRENT MEDICAL RECORDS, OUTSIDE HEALTH PROVIDER RECORDS, PREVIOUS HISTORY, EXAM, TEST, PROCEDURE, AND MEDICATIONS)  2. FACE TO FACE ENCOUNTER OBTAINING HISTORY FROM THE PATIENT/FAMILY/CAREGIVERS; PERFORMING EVALUATION AND EXAMINATION; ORDERING TESTS OR PROCEDURES; REFERRING AND COMMUNICATING WITH OTHER HEALTHCARE PROVIDERS; COUNSELING  AND EDUCATION OF THE PATIENT/FAMILY/CAREGIVERS; INDEPENDENTLY INTERPRETING RESULTS (TESTS, LABS, PROCEDURES, IMAGING) AND COMMUNICATING AND EXPLAINING RESULTS TO THE PATIENT/FAMILY/CAREGIVERS  3. COORDINATION OF CARE; PREPARING AND PRINTING DISCHARGE INSTRUCTIONS AND ANY EDUCATIONAL MATERIAL FOR THE PATIENT/FAMILY/CAREGIVERS. DOCUMENTING CLINICAL INFORMATION IN THE ELECTRONIC MEDICAL RECORD   4. REVIEWING OARRS AS NEEDED    MDM  1) COMPLEXITY: MORE THAN 1 STABLE CHRONIC CONDITION ADDRESSED OR 1 ACUTE ILLNESS ADDRESSED   2)DATA: TESTS INTERPRETED AND OR ORDERED, TOOK INDEPENDENT HISTORY OR RECORDS REVIEWED  3)RISK: MODERATE RISK DUE TO NATURE OF MEDICAL CONDITIONS/COMORBIDITY OR MEDICATIONS ORDERED OR SURGICAL OR PROCEDURE REFERRAL    2 WEEKS MED CHECK VIRTUAL

## 2023-05-18 ENCOUNTER — TELEMEDICINE (OUTPATIENT)
Dept: PRIMARY CARE | Facility: CLINIC | Age: 63
End: 2023-05-18
Payer: MEDICAID

## 2023-05-18 DIAGNOSIS — F32.1 DEPRESSION, MAJOR, SINGLE EPISODE, MODERATE (MULTI): Primary | ICD-10-CM

## 2023-05-18 DIAGNOSIS — E53.8 VITAMIN B12 DEFICIENCY: ICD-10-CM

## 2023-05-18 DIAGNOSIS — F41.9 ANXIETY: ICD-10-CM

## 2023-05-18 PROCEDURE — 99214 OFFICE O/P EST MOD 30 MIN: CPT | Performed by: NURSE PRACTITIONER

## 2023-05-18 RX ORDER — QUETIAPINE FUMARATE 25 MG/1
25 TABLET, FILM COATED ORAL NIGHTLY
Qty: 90 TABLET | Refills: 0 | Status: SHIPPED | OUTPATIENT
Start: 2023-05-18 | End: 2023-06-01 | Stop reason: ALTCHOICE

## 2023-05-18 RX ORDER — ARIPIPRAZOLE 10 MG/1
10 TABLET ORAL DAILY
Qty: 90 TABLET | Refills: 3 | Status: SHIPPED | OUTPATIENT
Start: 2023-05-18 | End: 2023-10-19 | Stop reason: ALTCHOICE

## 2023-05-18 RX ORDER — LANOLIN ALCOHOL/MO/W.PET/CERES
1000 CREAM (GRAM) TOPICAL DAILY
Qty: 90 TABLET | Refills: 3 | Status: SHIPPED | OUTPATIENT
Start: 2023-05-18 | End: 2023-08-03 | Stop reason: ALTCHOICE

## 2023-05-18 ASSESSMENT — ENCOUNTER SYMPTOMS
SPEECH DIFFICULTY: 0
WHEEZING: 0
UNEXPECTED WEIGHT CHANGE: 0
ARTHRALGIAS: 0
COUGH: 0
NERVOUS/ANXIOUS: 0
VOMITING: 0
NUMBNESS: 0
FREQUENCY: 0
NECK PAIN: 0
CHEST TIGHTNESS: 0
ABDOMINAL DISTENTION: 0
DYSURIA: 0
MYALGIAS: 0
FLANK PAIN: 0
FACIAL ASYMMETRY: 0
APNEA: 0
BLOOD IN STOOL: 0
FEVER: 0
DIARRHEA: 0
SEIZURES: 0
SHORTNESS OF BREATH: 0
EYE REDNESS: 0
TROUBLE SWALLOWING: 0
JOINT SWELLING: 0
CHOKING: 0
CHILLS: 0
PALPITATIONS: 0
ABDOMINAL PAIN: 0
WEAKNESS: 0
DIZZINESS: 0
HEMATURIA: 0
WOUND: 0
SLEEP DISTURBANCE: 0
CONFUSION: 0
FATIGUE: 0
DIFFICULTY URINATING: 0
HEADACHES: 0
BACK PAIN: 0
PHOTOPHOBIA: 0
SORE THROAT: 0
EYE PAIN: 0
CONSTIPATION: 0
NAUSEA: 0

## 2023-05-18 NOTE — PROGRESS NOTES
Subjective   Patient ID: Naveed Brown is a 62 y.o. male who presents for Follow-up (DEPRESSION, PT STATES HE'S DOING WELL).    VIRTUAL APPOINTMENT BEING PERFORMED DUE TO COVID-19 (CORONAVIRUS)    HPI:  Presents today for MED CHECK  AFTER INCREASING ABILIFY FROM 10 MG TO 20 MG. HE STATES HE FEELS RESTLESS SINCE THE INCREASE. NO NEW COMPLAINTS   Visit Vitals  Smoking Status Every Day        Review of Systems   Constitutional:  Negative for chills, fatigue, fever and unexpected weight change.   HENT:  Negative for congestion, ear pain, sore throat and trouble swallowing.    Eyes:  Negative for photophobia, pain, redness and visual disturbance.   Respiratory:  Negative for apnea, cough, choking, chest tightness, shortness of breath and wheezing.    Cardiovascular:  Negative for chest pain, palpitations and leg swelling.   Gastrointestinal:  Negative for abdominal distention, abdominal pain, blood in stool, constipation, diarrhea, nausea and vomiting.   Genitourinary:  Negative for difficulty urinating, dysuria, flank pain, frequency, hematuria and urgency.   Musculoskeletal:  Negative for arthralgias, back pain, gait problem, joint swelling, myalgias and neck pain.   Skin:  Negative for rash and wound.   Neurological:  Negative for dizziness, seizures, syncope, facial asymmetry, speech difficulty, weakness, numbness and headaches.   Psychiatric/Behavioral:  Negative for confusion, sleep disturbance and suicidal ideas. The patient is not nervous/anxious.        Objective     Physical Exam  Psychiatric:         Mood and Affect: Mood normal.         Thought Content: Thought content normal.         Judgment: Judgment normal.            Assessment/Plan   Problem List Items Addressed This Visit          Endocrine/Metabolic    Vitamin B12 deficiency    Relevant Medications    cyanocobalamin (Vitamin B-12) 1,000 mcg tablet       Other    Anxiety    Relevant Medications    QUEtiapine (SEROquel) 25 mg tablet    ARIPiprazole  (Abilify) 10 mg tablet    Depression, major, single episode, moderate (CMS/HCC) - Primary    Relevant Medications    QUEtiapine (SEROquel) 25 mg tablet    ARIPiprazole (Abilify) 10 mg tablet     DECREASE ABILIFY BACK TO 10 MG AND START SEROQUEL 25 MG AT BEDTIME.     WE DISCUSSED MOST COMMON SIDE EFFECTS OF PRESCRIBED MEDICATIONS. INDICATIONS, RISK, COMPLICATIONS, AND ALTERNATIVES OF MEDICATION/THERAPEUTICS WERE EXPLAINED AND DISCUSSED. PLEASE MONITOR CLOSELY FOR ANY UNTOWARD SIDE EFFECTS OR COMPLICATIONS OF MEDICATIONS. PATIENT IS STRONGLY ADVISED TO BE COMPLIANT WITH RECOMMENDATIONS. QUESTIONS AND CONCERNS WERE ADDRESSED. INSTRUCTED TO CALL, RETURN SOONER, OR GO TO THE ER,  IF SYMPTOMS PERSIST OR WORSEN. THEY VOICED UNDERSTANDING AND  DENIES FURTHER QUESTIONS AT THIS TIME.    TIME CODE  1. PREPARATION FOR PATIENT'S VISIT (REVIEWING CHART, CURRENT MEDICAL RECORDS, OUTSIDE HEALTH PROVIDER RECORDS, PREVIOUS HISTORY, EXAM, TEST, PROCEDURE, AND MEDICATIONS)  2. FACE TO FACE ENCOUNTER OBTAINING HISTORY FROM THE PATIENT/FAMILY/CAREGIVERS; PERFORMING EVALUATION AND EXAMINATION; ORDERING TESTS OR PROCEDURES; REFERRING AND COMMUNICATING WITH OTHER HEALTHCARE PROVIDERS; COUNSELING AND EDUCATION OF THE PATIENT/FAMILY/CAREGIVERS; INDEPENDENTLY INTERPRETING RESULTS (TESTS, LABS, PROCEDURES, IMAGING) AND COMMUNICATING AND EXPLAINING RESULTS TO THE PATIENT/FAMILY/CAREGIVERS  3. COORDINATION OF CARE; PREPARING AND PRINTING DISCHARGE INSTRUCTIONS AND ANY EDUCATIONAL MATERIAL FOR THE PATIENT/FAMILY/CAREGIVERS. DOCUMENTING CLINICAL INFORMATION IN THE ELECTRONIC MEDICAL RECORD   4. REVIEWING OARRS AS NEEDED    MDM  1) COMPLEXITY: MORE THAN 1 STABLE CHRONIC CONDITION ADDRESSED OR 1 ACUTE ILLNESS ADDRESSED   2)DATA: TESTS INTERPRETED AND OR ORDERED, TOOK INDEPENDENT HISTORY OR RECORDS REVIEWED  3)RISK: MODERATE RISK DUE TO NATURE OF MEDICAL CONDITIONS/COMORBIDITY OR MEDICATIONS ORDERED OR SURGICAL OR PROCEDURE REFERRAL    6/1/23   PM VIRTUAL MED CHECK

## 2023-06-01 ENCOUNTER — TELEMEDICINE (OUTPATIENT)
Dept: PRIMARY CARE | Facility: CLINIC | Age: 63
End: 2023-06-01
Payer: MEDICAID

## 2023-06-01 DIAGNOSIS — F32.1 DEPRESSION, MAJOR, SINGLE EPISODE, MODERATE (MULTI): ICD-10-CM

## 2023-06-01 DIAGNOSIS — F41.9 ANXIETY: ICD-10-CM

## 2023-06-01 PROCEDURE — 99214 OFFICE O/P EST MOD 30 MIN: CPT | Performed by: NURSE PRACTITIONER

## 2023-06-01 RX ORDER — QUETIAPINE FUMARATE 50 MG/1
50 TABLET, FILM COATED ORAL NIGHTLY
Qty: 90 TABLET | Refills: 0 | Status: SHIPPED | OUTPATIENT
Start: 2023-06-01 | End: 2023-06-15 | Stop reason: ALTCHOICE

## 2023-06-01 ASSESSMENT — ENCOUNTER SYMPTOMS
NERVOUS/ANXIOUS: 1
FACIAL ASYMMETRY: 0
HEMATURIA: 0
VOMITING: 0
WEAKNESS: 0
SPEECH DIFFICULTY: 0
FREQUENCY: 0
HEADACHES: 0
EYE REDNESS: 0
SEIZURES: 0
BACK PAIN: 0
UNEXPECTED WEIGHT CHANGE: 0
DYSPHORIC MOOD: 1
CHILLS: 0
WHEEZING: 0
PALPITATIONS: 0
FATIGUE: 0
PHOTOPHOBIA: 0
DIARRHEA: 0
SHORTNESS OF BREATH: 0
CONFUSION: 0
APNEA: 0
ABDOMINAL PAIN: 0
DIFFICULTY URINATING: 0
FLANK PAIN: 0
NAUSEA: 0
NECK PAIN: 0
DYSURIA: 0
ABDOMINAL DISTENTION: 0
CHOKING: 0
SORE THROAT: 0
WOUND: 0
JOINT SWELLING: 0
COUGH: 0
NUMBNESS: 0
CHEST TIGHTNESS: 0
ARTHRALGIAS: 0
DIZZINESS: 0
SLEEP DISTURBANCE: 0
TROUBLE SWALLOWING: 0
EYE PAIN: 0
FEVER: 0
MYALGIAS: 0
BLOOD IN STOOL: 0
CONSTIPATION: 0

## 2023-06-01 NOTE — PROGRESS NOTES
Subjective   Patient ID: Naveed Brown is a 62 y.o. male who presents for Follow-up (MED CHECK ).    VIRTUAL APPOINTMENT BEING PERFORMED DUE TO COVID-19 (CORONAVIRUS)    HPI:  Presents today for MED CHECK OF SEROQUEL. HE IS TOLERATING MED WELL BUT STATES HE FEELS NO DIFFERENT IN HIS MOOD. C/O DEPRESSION THAT REMAINS  modifying factors consists of NO SUICIDAL IDEATION  associated symptoms consist of ANXIETY. FEELS DOWN. NO MOTIVATION  prior treatment consists of medication PROZAC AND SEROQUEL     Visit Vitals  Smoking Status Every Day        Review of Systems   Constitutional:  Negative for chills, fatigue, fever and unexpected weight change.   HENT:  Negative for congestion, ear pain, sore throat and trouble swallowing.    Eyes:  Negative for photophobia, pain, redness and visual disturbance.   Respiratory:  Negative for apnea, cough, choking, chest tightness, shortness of breath and wheezing.    Cardiovascular:  Negative for chest pain, palpitations and leg swelling.   Gastrointestinal:  Negative for abdominal distention, abdominal pain, blood in stool, constipation, diarrhea, nausea and vomiting.   Genitourinary:  Negative for difficulty urinating, dysuria, flank pain, frequency, hematuria and urgency.   Musculoskeletal:  Negative for arthralgias, back pain, gait problem, joint swelling, myalgias and neck pain.   Skin:  Negative for rash and wound.   Neurological:  Negative for dizziness, seizures, syncope, facial asymmetry, speech difficulty, weakness, numbness and headaches.   Psychiatric/Behavioral:  Positive for dysphoric mood. Negative for confusion, sleep disturbance and suicidal ideas. The patient is nervous/anxious.        Objective     Physical Exam  Psychiatric:         Mood and Affect: Mood normal.         Thought Content: Thought content normal.         Judgment: Judgment normal.            Assessment/Plan   Problem List Items Addressed This Visit          Other    Anxiety    Relevant Medications     QUEtiapine (Seroquel) 50 mg tablet    Other Relevant Orders    Referral to Psychiatry    Depression, major, single episode, moderate (CMS/HCC)    Relevant Medications    QUEtiapine (Seroquel) 50 mg tablet    Other Relevant Orders    Referral to Psychiatry       I WILL REFER TO CRISTINA. INCREASE SEROQUEL TO 50 MG. INSTRUCTED TO INCREASE TO 75 MG IN 1 WEEK IF TOLERATING WELL.     WE DISCUSSED MOST COMMON SIDE EFFECTS OF PRESCRIBED MEDICATIONS. INDICATIONS, RISK, COMPLICATIONS, AND ALTERNATIVES OF MEDICATION/THERAPEUTICS WERE EXPLAINED AND DISCUSSED. PLEASE MONITOR CLOSELY FOR ANY UNTOWARD SIDE EFFECTS OR COMPLICATIONS OF MEDICATIONS. PATIENT IS STRONGLY ADVISED TO BE COMPLIANT WITH RECOMMENDATIONS. QUESTIONS AND CONCERNS WERE ADDRESSED. INSTRUCTED TO CALL, RETURN SOONER, OR GO TO THE ER,  IF SYMPTOMS PERSIST OR WORSEN. THEY VOICED UNDERSTANDING AND  DENIES FURTHER QUESTIONS AT THIS TIME.    TIME CODE  1. PREPARATION FOR PATIENT'S VISIT (REVIEWING CHART, CURRENT MEDICAL RECORDS, OUTSIDE HEALTH PROVIDER RECORDS, PREVIOUS HISTORY, EXAM, TEST, PROCEDURE, AND MEDICATIONS)  2. FACE TO FACE ENCOUNTER OBTAINING HISTORY FROM THE PATIENT/FAMILY/CAREGIVERS; PERFORMING EVALUATION AND EXAMINATION; ORDERING TESTS OR PROCEDURES; REFERRING AND COMMUNICATING WITH OTHER HEALTHCARE PROVIDERS; COUNSELING AND EDUCATION OF THE PATIENT/FAMILY/CAREGIVERS; INDEPENDENTLY INTERPRETING RESULTS (TESTS, LABS, PROCEDURES, IMAGING) AND COMMUNICATING AND EXPLAINING RESULTS TO THE PATIENT/FAMILY/CAREGIVERS  3. COORDINATION OF CARE; PREPARING AND PRINTING DISCHARGE INSTRUCTIONS AND ANY EDUCATIONAL MATERIAL FOR THE PATIENT/FAMILY/CAREGIVERS. DOCUMENTING CLINICAL INFORMATION IN THE ELECTRONIC MEDICAL RECORD   4. REVIEWING OARRS AS NEEDED    MDM  1) COMPLEXITY: MORE THAN 1 STABLE CHRONIC CONDITION ADDRESSED OR 1 ACUTE ILLNESS ADDRESSED   2)DATA: TESTS INTERPRETED AND OR ORDERED, TOOK INDEPENDENT HISTORY OR RECORDS REVIEWED  3)RISK: MODERATE RISK DUE  TO NATURE OF MEDICAL CONDITIONS/COMORBIDITY OR MEDICATIONS ORDERED OR SURGICAL OR PROCEDURE REFERRAL    6/15/23  MED CHECK VIRTUAL

## 2023-06-15 ENCOUNTER — TELEMEDICINE (OUTPATIENT)
Dept: PRIMARY CARE | Facility: CLINIC | Age: 63
End: 2023-06-15
Payer: MEDICAID

## 2023-06-15 DIAGNOSIS — F32.1 DEPRESSION, MAJOR, SINGLE EPISODE, MODERATE (MULTI): Primary | ICD-10-CM

## 2023-06-15 DIAGNOSIS — F41.9 ANXIETY: ICD-10-CM

## 2023-06-15 PROCEDURE — 99214 OFFICE O/P EST MOD 30 MIN: CPT | Performed by: NURSE PRACTITIONER

## 2023-06-15 RX ORDER — QUETIAPINE FUMARATE 100 MG/1
100 TABLET, FILM COATED ORAL NIGHTLY
Qty: 90 TABLET | Refills: 0 | Status: SHIPPED | OUTPATIENT
Start: 2023-06-15 | End: 2023-06-29 | Stop reason: ALTCHOICE

## 2023-06-15 ASSESSMENT — ENCOUNTER SYMPTOMS
JOINT SWELLING: 0
MYALGIAS: 0
CHILLS: 0
FACIAL ASYMMETRY: 0
TROUBLE SWALLOWING: 0
SEIZURES: 0
DIARRHEA: 0
DYSURIA: 0
PHOTOPHOBIA: 0
SLEEP DISTURBANCE: 0
EYE PAIN: 0
CHEST TIGHTNESS: 0
DIZZINESS: 0
WEAKNESS: 0
FLANK PAIN: 0
BACK PAIN: 0
SHORTNESS OF BREATH: 0
FEVER: 0
CONFUSION: 0
ARTHRALGIAS: 0
ABDOMINAL PAIN: 0
COUGH: 0
SORE THROAT: 0
HEADACHES: 0
BLOOD IN STOOL: 0
PALPITATIONS: 0
WOUND: 0
EYE REDNESS: 0
FREQUENCY: 0
WHEEZING: 0
CHOKING: 0
CONSTIPATION: 0
NECK PAIN: 0
VOMITING: 0
APNEA: 0
HEMATURIA: 0
SPEECH DIFFICULTY: 0
FATIGUE: 0
DIFFICULTY URINATING: 0
NERVOUS/ANXIOUS: 0
NAUSEA: 0
ABDOMINAL DISTENTION: 0
NUMBNESS: 0
UNEXPECTED WEIGHT CHANGE: 0

## 2023-06-15 NOTE — PROGRESS NOTES
Subjective   Patient ID: Naveed Brown is a 62 y.o. male who presents for Follow-up (MED CHECK).    VIRTUAL APPOINTMENT BEING PERFORMED DUE TO COVID-19 (CORONAVIRUS)    HPI:  Presents today for MED CHECK  OF ABILIFY AND SEROQUEL WHICH HE TAKES FOR DEPRESSION. HE IS DOING WELL ON SEROQUEL BUT FEELS IT CAN BE INCREASED. NO NEW COMPLAINTS     Visit Vitals  Smoking Status Every Day        Review of Systems   Constitutional:  Negative for chills, fatigue, fever and unexpected weight change.   HENT:  Negative for congestion, ear pain, sore throat and trouble swallowing.    Eyes:  Negative for photophobia, pain, redness and visual disturbance.   Respiratory:  Negative for apnea, cough, choking, chest tightness, shortness of breath and wheezing.    Cardiovascular:  Negative for chest pain, palpitations and leg swelling.   Gastrointestinal:  Negative for abdominal distention, abdominal pain, blood in stool, constipation, diarrhea, nausea and vomiting.   Genitourinary:  Negative for difficulty urinating, dysuria, flank pain, frequency, hematuria and urgency.   Musculoskeletal:  Negative for arthralgias, back pain, gait problem, joint swelling, myalgias and neck pain.   Skin:  Negative for rash and wound.   Neurological:  Negative for dizziness, seizures, syncope, facial asymmetry, speech difficulty, weakness, numbness and headaches.   Psychiatric/Behavioral:  Negative for confusion, sleep disturbance and suicidal ideas. The patient is not nervous/anxious.        Objective     Physical Exam  Psychiatric:         Mood and Affect: Mood normal.         Behavior: Behavior normal.         Thought Content: Thought content normal.         Judgment: Judgment normal.            Assessment/Plan   Problem List Items Addressed This Visit          Other    Anxiety    Depression, major, single episode, moderate (CMS/HCC) - Primary    Relevant Medications    QUEtiapine (Seroquel) 100 mg tablet     INCREASE SEROQUEL  MG TO 50  MG    WE DISCUSSED MOST COMMON SIDE EFFECTS OF PRESCRIBED MEDICATIONS. INDICATIONS, RISK, COMPLICATIONS, AND ALTERNATIVES OF MEDICATION/THERAPEUTICS WERE EXPLAINED AND DISCUSSED. PLEASE MONITOR CLOSELY FOR ANY UNTOWARD SIDE EFFECTS OR COMPLICATIONS OF MEDICATIONS. PATIENT IS STRONGLY ADVISED TO BE COMPLIANT WITH RECOMMENDATIONS. QUESTIONS AND CONCERNS WERE ADDRESSED. INSTRUCTED TO CALL, RETURN SOONER, OR GO TO THE ER,  IF SYMPTOMS PERSIST OR WORSEN. THEY VOICED UNDERSTANDING AND  DENIES FURTHER QUESTIONS AT THIS TIME.    TIME CODE  1. PREPARATION FOR PATIENT'S VISIT (REVIEWING CHART, CURRENT MEDICAL RECORDS, OUTSIDE HEALTH PROVIDER RECORDS, PREVIOUS HISTORY, EXAM, TEST, PROCEDURE, AND MEDICATIONS)  2. FACE TO FACE ENCOUNTER OBTAINING HISTORY FROM THE PATIENT/FAMILY/CAREGIVERS; PERFORMING EVALUATION AND EXAMINATION; ORDERING TESTS OR PROCEDURES; REFERRING AND COMMUNICATING WITH OTHER HEALTHCARE PROVIDERS; COUNSELING AND EDUCATION OF THE PATIENT/FAMILY/CAREGIVERS; INDEPENDENTLY INTERPRETING RESULTS (TESTS, LABS, PROCEDURES, IMAGING) AND COMMUNICATING AND EXPLAINING RESULTS TO THE PATIENT/FAMILY/CAREGIVERS  3. COORDINATION OF CARE; PREPARING AND PRINTING DISCHARGE INSTRUCTIONS AND ANY EDUCATIONAL MATERIAL FOR THE PATIENT/FAMILY/CAREGIVERS. DOCUMENTING CLINICAL INFORMATION IN THE ELECTRONIC MEDICAL RECORD   4. REVIEWING OARRS AS NEEDED    MDM  1) COMPLEXITY: MORE THAN 1 STABLE CHRONIC CONDITION ADDRESSED OR 1 ACUTE ILLNESS ADDRESSED   2)DATA: TESTS INTERPRETED AND OR ORDERED, TOOK INDEPENDENT HISTORY OR RECORDS REVIEWED  3)RISK: MODERATE RISK DUE TO NATURE OF MEDICAL CONDITIONS/COMORBIDITY OR MEDICATIONS ORDERED OR SURGICAL OR PROCEDURE REFERRAL    6/29/23  PM VIRTUAL

## 2023-06-29 ENCOUNTER — TELEMEDICINE (OUTPATIENT)
Dept: PRIMARY CARE | Facility: CLINIC | Age: 63
End: 2023-06-29
Payer: MEDICAID

## 2023-06-29 DIAGNOSIS — F32.1 DEPRESSION, MAJOR, SINGLE EPISODE, MODERATE (MULTI): Primary | ICD-10-CM

## 2023-06-29 PROCEDURE — 99442 PR PHYS/QHP TELEPHONE EVALUATION 11-20 MIN: CPT | Performed by: NURSE PRACTITIONER

## 2023-06-29 RX ORDER — LAMOTRIGINE 25 MG/1
25 TABLET ORAL DAILY
Qty: 30 TABLET | Refills: 0 | Status: SHIPPED | OUTPATIENT
Start: 2023-06-29 | End: 2023-07-20 | Stop reason: ALTCHOICE

## 2023-06-29 ASSESSMENT — ENCOUNTER SYMPTOMS
BLOOD IN STOOL: 0
PALPITATIONS: 0
CONSTIPATION: 0
FLANK PAIN: 0
TROUBLE SWALLOWING: 0
EYE PAIN: 0
BACK PAIN: 0
HEMATURIA: 0
DIZZINESS: 0
FEVER: 0
COUGH: 0
UNEXPECTED WEIGHT CHANGE: 0
SORE THROAT: 0
SEIZURES: 0
FREQUENCY: 0
WEAKNESS: 0
WOUND: 0
MYALGIAS: 0
SLEEP DISTURBANCE: 0
CHILLS: 0
JOINT SWELLING: 0
NUMBNESS: 0
CONFUSION: 0
APNEA: 0
HEADACHES: 0
FACIAL ASYMMETRY: 0
NERVOUS/ANXIOUS: 0
DIFFICULTY URINATING: 0
NAUSEA: 0
ABDOMINAL DISTENTION: 0
SHORTNESS OF BREATH: 0
PHOTOPHOBIA: 0
DYSURIA: 0
WHEEZING: 0
CHOKING: 0
DIARRHEA: 0
ABDOMINAL PAIN: 0
ARTHRALGIAS: 0
SPEECH DIFFICULTY: 0
EYE REDNESS: 0
NECK PAIN: 0
VOMITING: 0
FATIGUE: 0
CHEST TIGHTNESS: 0

## 2023-06-29 NOTE — PROGRESS NOTES
Subjective   Patient ID: Naveed Brown is a 62 y.o. male who presents for Follow-up (F/U. PT IS NOT FEELING ANY BETTER).      TELEPHONE APPOINTMENT BEING PERFORMED DUE TO COVID-19 (CORONAVIRUS)      HPI:  Presents today for MED CHECK OF SEROQUEL. HE DID NOT INCREASE MED LIKE DISCUSSED AT LAST APPT.  HE IS SLEEPING MORE THAN EVER AND STATES HIS MEMORY IS WORSE THAN BEFORE.  modifying factors consists of REFUSES TO SEE PSYCH  associated symptoms consist of FEELS DOWN. DENIES SUICIDAL IDEATION  prior treatment consists of medication SEROQUEL, ABILIFY, AND PROZAC CURRENTLY     Visit Vitals  Smoking Status Every Day        Review of Systems   Constitutional:  Negative for chills, fatigue, fever and unexpected weight change.   HENT:  Negative for congestion, ear pain, sore throat and trouble swallowing.    Eyes:  Negative for photophobia, pain, redness and visual disturbance.   Respiratory:  Negative for apnea, cough, choking, chest tightness, shortness of breath and wheezing.    Cardiovascular:  Negative for chest pain, palpitations and leg swelling.   Gastrointestinal:  Negative for abdominal distention, abdominal pain, blood in stool, constipation, diarrhea, nausea and vomiting.   Genitourinary:  Negative for difficulty urinating, dysuria, flank pain, frequency, hematuria and urgency.   Musculoskeletal:  Negative for arthralgias, back pain, gait problem, joint swelling, myalgias and neck pain.   Skin:  Negative for rash and wound.   Neurological:  Negative for dizziness, seizures, syncope, facial asymmetry, speech difficulty, weakness, numbness and headaches.   Psychiatric/Behavioral:  Negative for confusion, sleep disturbance and suicidal ideas. The patient is not nervous/anxious.        Objective     Physical Exam  Psychiatric:         Mood and Affect: Mood normal.         Thought Content: Thought content normal.         Judgment: Judgment normal.            Assessment/Plan   Problem List Items Addressed This  Visit       Depression, major, single episode, moderate (CMS/HCC) - Primary    Relevant Medications    lamoTRIgine (LaMICtal) 25 mg tablet       STOP SEROQUEL SINCE IT MAKES HE TOO SLEEPY. START LAMICTAL. HE CONTINUES TO REFUSE PSYCH REFERRAL. I WILL SEE HIM IN OFFICE FOR MED CHECK AND TO ORDER MRI BRAIN R/T MEMORY ISSUES THAT REMAINS    WE DISCUSSED MOST COMMON SIDE EFFECTS OF PRESCRIBED MEDICATIONS. INDICATIONS, RISK, COMPLICATIONS, AND ALTERNATIVES OF MEDICATION/THERAPEUTICS WERE EXPLAINED AND DISCUSSED. PLEASE MONITOR CLOSELY FOR ANY UNTOWARD SIDE EFFECTS OR COMPLICATIONS OF MEDICATIONS. PATIENT IS STRONGLY ADVISED TO BE COMPLIANT WITH RECOMMENDATIONS. QUESTIONS AND CONCERNS WERE ADDRESSED. INSTRUCTED TO CALL, RETURN SOONER, OR GO TO THE ER,  IF SYMPTOMS PERSIST OR WORSEN. THEY VOICED UNDERSTANDING AND  DENIES FURTHER QUESTIONS AT THIS TIME.    TIME CODE  1. PREPARATION FOR PATIENT'S VISIT (REVIEWING CHART, CURRENT MEDICAL RECORDS, OUTSIDE HEALTH PROVIDER RECORDS, PREVIOUS HISTORY, EXAM, TEST, PROCEDURE, AND MEDICATIONS)  2. FACE TO FACE ENCOUNTER OBTAINING HISTORY FROM THE PATIENT/FAMILY/CAREGIVERS; PERFORMING EVALUATION AND EXAMINATION; ORDERING TESTS OR PROCEDURES; REFERRING AND COMMUNICATING WITH OTHER HEALTHCARE PROVIDERS; COUNSELING AND EDUCATION OF THE PATIENT/FAMILY/CAREGIVERS; INDEPENDENTLY INTERPRETING RESULTS (TESTS, LABS, PROCEDURES, IMAGING) AND COMMUNICATING AND EXPLAINING RESULTS TO THE PATIENT/FAMILY/CAREGIVERS  3. COORDINATION OF CARE; PREPARING AND PRINTING DISCHARGE INSTRUCTIONS AND ANY EDUCATIONAL MATERIAL FOR THE PATIENT/FAMILY/CAREGIVERS. DOCUMENTING CLINICAL INFORMATION IN THE ELECTRONIC MEDICAL RECORD   4. REVIEWING OARRS AS NEEDED    MDM  1) COMPLEXITY: MORE THAN 1 STABLE CHRONIC CONDITION ADDRESSED OR 1 ACUTE ILLNESS ADDRESSED   2)DATA: TESTS INTERPRETED AND OR ORDERED, TOOK INDEPENDENT HISTORY OR RECORDS REVIEWED  3)RISK: MODERATE RISK DUE TO NATURE OF MEDICAL CONDITIONS/COMORBIDITY OR  MEDICATIONS ORDERED OR SURGICAL OR PROCEDURE REFERRAL      7/20/2023  PM

## 2023-07-20 ENCOUNTER — OFFICE VISIT (OUTPATIENT)
Dept: PRIMARY CARE | Facility: CLINIC | Age: 63
End: 2023-07-20
Payer: MEDICAID

## 2023-07-20 ENCOUNTER — TELEPHONE (OUTPATIENT)
Dept: PRIMARY CARE | Facility: CLINIC | Age: 63
End: 2023-07-20

## 2023-07-20 VITALS
HEART RATE: 56 BPM | HEIGHT: 69 IN | SYSTOLIC BLOOD PRESSURE: 124 MMHG | BODY MASS INDEX: 25.77 KG/M2 | OXYGEN SATURATION: 97 % | DIASTOLIC BLOOD PRESSURE: 72 MMHG | WEIGHT: 174 LBS

## 2023-07-20 DIAGNOSIS — R41.3 MEMORY IMPAIRMENT: Primary | ICD-10-CM

## 2023-07-20 DIAGNOSIS — F32.1 DEPRESSION, MAJOR, SINGLE EPISODE, MODERATE (MULTI): ICD-10-CM

## 2023-07-20 PROCEDURE — 3078F DIAST BP <80 MM HG: CPT | Performed by: NURSE PRACTITIONER

## 2023-07-20 PROCEDURE — 4004F PT TOBACCO SCREEN RCVD TLK: CPT | Performed by: NURSE PRACTITIONER

## 2023-07-20 PROCEDURE — 3074F SYST BP LT 130 MM HG: CPT | Performed by: NURSE PRACTITIONER

## 2023-07-20 PROCEDURE — 99214 OFFICE O/P EST MOD 30 MIN: CPT | Performed by: NURSE PRACTITIONER

## 2023-07-20 PROCEDURE — 3044F HG A1C LEVEL LT 7.0%: CPT | Performed by: NURSE PRACTITIONER

## 2023-07-20 RX ORDER — FLUOXETINE HYDROCHLORIDE 20 MG/1
20 CAPSULE ORAL DAILY
Qty: 90 CAPSULE | Refills: 3 | Status: SHIPPED | OUTPATIENT
Start: 2023-07-20 | End: 2024-07-14

## 2023-07-20 ASSESSMENT — ENCOUNTER SYMPTOMS
SORE THROAT: 0
NERVOUS/ANXIOUS: 0
NAUSEA: 0
FACIAL ASYMMETRY: 0
FATIGUE: 0
FLANK PAIN: 0
DIFFICULTY URINATING: 0
DYSURIA: 0
JOINT SWELLING: 0
DIZZINESS: 0
VOMITING: 0
CONSTIPATION: 0
HEMATURIA: 0
CHOKING: 0
BLOOD IN STOOL: 0
PALPITATIONS: 0
SPEECH DIFFICULTY: 0
ARTHRALGIAS: 0
COUGH: 0
DIARRHEA: 0
ABDOMINAL PAIN: 0
EYE REDNESS: 0
WOUND: 0
CHILLS: 0
BACK PAIN: 0
TROUBLE SWALLOWING: 0
CONFUSION: 0
FEVER: 0
NUMBNESS: 0
HEADACHES: 0
WEAKNESS: 0
MYALGIAS: 0
EYE PAIN: 0
FREQUENCY: 0
APNEA: 0
WHEEZING: 0
ABDOMINAL DISTENTION: 0
NECK PAIN: 0
CHEST TIGHTNESS: 0
SEIZURES: 0
UNEXPECTED WEIGHT CHANGE: 0
SHORTNESS OF BREATH: 0
SLEEP DISTURBANCE: 0
PHOTOPHOBIA: 0

## 2023-07-20 NOTE — PROGRESS NOTES
"Subjective   Patient ID: Naveed Brown is a 62 y.o. male who presents for Follow-up (PT STATES THE MEDICATION IS NOT HELPING).      HPI:  Presents today for C/O FORGETFULNESS THAT HAS INCREASED OVER THE PAST FEW MONTHS  modifying factors consists of NONE associated symptoms consist of FORGET DATES AND  prior treatment consists of medication NONE    ANXIETY/DEPRESSION- FEELS LIKE THE ORIGINAL MEDS HELPED MORE     Visit Vitals  /72 (BP Location: Right arm, Patient Position: Sitting)   Pulse 56   Ht 1.753 m (5' 9\")   Wt 78.9 kg (174 lb)   SpO2 97%   BMI 25.70 kg/m²   Smoking Status Every Day   BSA 1.96 m²        Review of Systems   Constitutional:  Negative for chills, fatigue, fever and unexpected weight change.   HENT:  Negative for congestion, ear pain, sore throat and trouble swallowing.    Eyes:  Negative for photophobia, pain, redness and visual disturbance.   Respiratory:  Negative for apnea, cough, choking, chest tightness, shortness of breath and wheezing.    Cardiovascular:  Negative for chest pain, palpitations and leg swelling.   Gastrointestinal:  Negative for abdominal distention, abdominal pain, blood in stool, constipation, diarrhea, nausea and vomiting.   Genitourinary:  Negative for difficulty urinating, dysuria, flank pain, frequency, hematuria and urgency.   Musculoskeletal:  Negative for arthralgias, back pain, gait problem, joint swelling, myalgias and neck pain.   Skin:  Negative for rash and wound.   Neurological:  Negative for dizziness, seizures, syncope, facial asymmetry, speech difficulty, weakness, numbness and headaches.   Psychiatric/Behavioral:  Negative for confusion, sleep disturbance and suicidal ideas. The patient is not nervous/anxious.        Objective     Physical Exam  Constitutional:       Appearance: Normal appearance. He is normal weight.   HENT:      Head: Normocephalic.   Eyes:      Extraocular Movements: Extraocular movements intact.      Conjunctiva/sclera: " Conjunctivae normal.      Pupils: Pupils are equal, round, and reactive to light.   Cardiovascular:      Rate and Rhythm: Normal rate and regular rhythm.      Pulses: Normal pulses.      Heart sounds: Normal heart sounds.   Pulmonary:      Effort: Pulmonary effort is normal.      Breath sounds: Normal breath sounds.   Musculoskeletal:         General: Normal range of motion.      Cervical back: Normal range of motion.   Skin:     General: Skin is warm and dry.   Neurological:      General: No focal deficit present.      Mental Status: He is alert and oriented to person, place, and time.      Cranial Nerves: No cranial nerve deficit.      Sensory: No sensory deficit.      Motor: No weakness.      Coordination: Coordination normal.      Gait: Gait normal.      Deep Tendon Reflexes: Reflexes normal.   Psychiatric:         Mood and Affect: Mood normal.         Behavior: Behavior normal.         Thought Content: Thought content normal.         Judgment: Judgment normal.            Assessment/Plan   Problem List Items Addressed This Visit       Depression, major, single episode, moderate (CMS/HCC)    Relevant Medications    FLUoxetine (PROzac) 20 mg capsule    Memory impairment - Primary    Relevant Orders    MR brain w and wo IV contrast     HE WOULD LIKE TO RETURN TO JUST TAKING ABILIFY AND PROZAC. I WILL ORDER MRI BRAIN TO FURTHER ASSESS MEMORY ISSUES. CONTINUES TO REFUSE PSYCH REFERRAL     WE DISCUSSED MOST COMMON SIDE EFFECTS OF PRESCRIBED MEDICATIONS. INDICATIONS, RISK, COMPLICATIONS, AND ALTERNATIVES OF MEDICATION/THERAPEUTICS WERE EXPLAINED AND DISCUSSED. PLEASE MONITOR CLOSELY FOR ANY UNTOWARD SIDE EFFECTS OR COMPLICATIONS OF MEDICATIONS. PATIENT IS STRONGLY ADVISED TO BE COMPLIANT WITH RECOMMENDATIONS. QUESTIONS AND CONCERNS WERE ADDRESSED. INSTRUCTED TO CALL, RETURN SOONER, OR GO TO THE ER,  IF SYMPTOMS PERSIST OR WORSEN. THEY VOICED UNDERSTANDING AND  DENIES FURTHER QUESTIONS AT THIS TIME.    TIME CODE  1.  PREPARATION FOR PATIENT'S VISIT (REVIEWING CHART, CURRENT MEDICAL RECORDS, OUTSIDE HEALTH PROVIDER RECORDS, PREVIOUS HISTORY, EXAM, TEST, PROCEDURE, AND MEDICATIONS)  2. FACE TO FACE ENCOUNTER OBTAINING HISTORY FROM THE PATIENT/FAMILY/CAREGIVERS; PERFORMING EVALUATION AND EXAMINATION; ORDERING TESTS OR PROCEDURES; REFERRING AND COMMUNICATING WITH OTHER HEALTHCARE PROVIDERS; COUNSELING AND EDUCATION OF THE PATIENT/FAMILY/CAREGIVERS; INDEPENDENTLY INTERPRETING RESULTS (TESTS, LABS, PROCEDURES, IMAGING) AND COMMUNICATING AND EXPLAINING RESULTS TO THE PATIENT/FAMILY/CAREGIVERS  3. COORDINATION OF CARE; PREPARING AND PRINTING DISCHARGE INSTRUCTIONS AND ANY EDUCATIONAL MATERIAL FOR THE PATIENT/FAMILY/CAREGIVERS. DOCUMENTING CLINICAL INFORMATION IN THE ELECTRONIC MEDICAL RECORD   4. REVIEWING OARRS AS NEEDED      MDM  1) COMPLEXITY: MORE THAN 1 STABLE CHRONIC CONDITION ADDRESSED OR 1 ACUTE ILLNESS ADDRESSED   2)DATA: TESTS INTERPRETED AND OR ORDERED, TOOK INDEPENDENT HISTORY OR RECORDS REVIEWED  3)RISK: MODERATE RISK DUE TO NATURE OF MEDICAL CONDITIONS/COMORBIDITY OR MEDICATIONS ORDERED OR SURGICAL OR PROCEDURE REFERRAL    AFTER TESTING LABS

## 2023-07-27 ENCOUNTER — LAB (OUTPATIENT)
Dept: LAB | Facility: LAB | Age: 63
End: 2023-07-27
Payer: MEDICAID

## 2023-07-27 LAB
ALANINE AMINOTRANSFERASE (SGPT) (U/L) IN SER/PLAS: 14 U/L (ref 10–52)
ALBUMIN (G/DL) IN SER/PLAS: 4.4 G/DL (ref 3.4–5)
ALKALINE PHOSPHATASE (U/L) IN SER/PLAS: 88 U/L (ref 33–136)
ANION GAP IN SER/PLAS: 10 MMOL/L (ref 10–20)
ASPARTATE AMINOTRANSFERASE (SGOT) (U/L) IN SER/PLAS: 13 U/L (ref 9–39)
BILIRUBIN TOTAL (MG/DL) IN SER/PLAS: 0.5 MG/DL (ref 0–1.2)
CALCIDIOL (25 OH VITAMIN D3) (NG/ML) IN SER/PLAS: 42 NG/ML
CALCIUM (MG/DL) IN SER/PLAS: 9.1 MG/DL (ref 8.6–10.3)
CARBON DIOXIDE, TOTAL (MMOL/L) IN SER/PLAS: 29 MMOL/L (ref 21–32)
CHLORIDE (MMOL/L) IN SER/PLAS: 105 MMOL/L (ref 98–107)
COBALAMIN (VITAMIN B12) (PG/ML) IN SER/PLAS: 1235 PG/ML (ref 211–911)
CREATININE (MG/DL) IN SER/PLAS: 0.8 MG/DL (ref 0.5–1.3)
ESTIMATED AVERAGE GLUCOSE FOR HBA1C: 137 MG/DL
GFR MALE: >90 ML/MIN/1.73M2
GLUCOSE (MG/DL) IN SER/PLAS: 135 MG/DL (ref 74–99)
HEMOGLOBIN A1C/HEMOGLOBIN TOTAL IN BLOOD: 6.4 %
IRON (UG/DL) IN SER/PLAS: 55 UG/DL (ref 35–150)
IRON BINDING CAPACITY (UG/DL) IN SER/PLAS: 295 UG/DL (ref 240–445)
IRON SATURATION (%) IN SER/PLAS: 19 % (ref 25–45)
POTASSIUM (MMOL/L) IN SER/PLAS: 4.3 MMOL/L (ref 3.5–5.3)
PROTEIN TOTAL: 6.2 G/DL (ref 6.4–8.2)
SODIUM (MMOL/L) IN SER/PLAS: 140 MMOL/L (ref 136–145)
THYROTROPIN (MIU/L) IN SER/PLAS BY DETECTION LIMIT <= 0.05 MIU/L: 2.91 MIU/L (ref 0.44–3.98)
UREA NITROGEN (MG/DL) IN SER/PLAS: 15 MG/DL (ref 6–23)

## 2023-07-27 PROCEDURE — 36415 COLL VENOUS BLD VENIPUNCTURE: CPT

## 2023-07-27 PROCEDURE — 82607 VITAMIN B-12: CPT

## 2023-07-27 PROCEDURE — 83540 ASSAY OF IRON: CPT

## 2023-07-27 PROCEDURE — 84443 ASSAY THYROID STIM HORMONE: CPT

## 2023-07-27 PROCEDURE — 82570 ASSAY OF URINE CREATININE: CPT

## 2023-07-27 PROCEDURE — 83036 HEMOGLOBIN GLYCOSYLATED A1C: CPT

## 2023-07-27 PROCEDURE — 80053 COMPREHEN METABOLIC PANEL: CPT

## 2023-07-27 PROCEDURE — 82306 VITAMIN D 25 HYDROXY: CPT

## 2023-07-27 PROCEDURE — 82043 UR ALBUMIN QUANTITATIVE: CPT

## 2023-07-27 PROCEDURE — 83550 IRON BINDING TEST: CPT

## 2023-07-28 LAB
ALBUMIN (MG/L) IN URINE: 28.1 MG/L
ALBUMIN/CREATININE (UG/MG) IN URINE: 13.6 UG/MG CRT (ref 0–30)
CREATININE (MG/DL) IN URINE: 207 MG/DL (ref 20–370)

## 2023-08-03 ENCOUNTER — OFFICE VISIT (OUTPATIENT)
Dept: PRIMARY CARE | Facility: CLINIC | Age: 63
End: 2023-08-03
Payer: MEDICAID

## 2023-08-03 VITALS
HEART RATE: 73 BPM | DIASTOLIC BLOOD PRESSURE: 68 MMHG | BODY MASS INDEX: 24.73 KG/M2 | WEIGHT: 167 LBS | HEIGHT: 69 IN | SYSTOLIC BLOOD PRESSURE: 110 MMHG

## 2023-08-03 DIAGNOSIS — Z12.5 SCREENING PSA (PROSTATE SPECIFIC ANTIGEN): ICD-10-CM

## 2023-08-03 DIAGNOSIS — R41.3 MEMORY IMPAIRMENT: ICD-10-CM

## 2023-08-03 DIAGNOSIS — F41.9 ANXIETY: ICD-10-CM

## 2023-08-03 DIAGNOSIS — E03.9 HYPOTHYROIDISM, UNSPECIFIED TYPE: ICD-10-CM

## 2023-08-03 DIAGNOSIS — F32.1 DEPRESSION, MAJOR, SINGLE EPISODE, MODERATE (MULTI): Primary | ICD-10-CM

## 2023-08-03 DIAGNOSIS — E11.9 TYPE 2 DIABETES MELLITUS WITHOUT COMPLICATION, WITHOUT LONG-TERM CURRENT USE OF INSULIN (MULTI): ICD-10-CM

## 2023-08-03 PROCEDURE — 4004F PT TOBACCO SCREEN RCVD TLK: CPT | Performed by: NURSE PRACTITIONER

## 2023-08-03 PROCEDURE — 3078F DIAST BP <80 MM HG: CPT | Performed by: NURSE PRACTITIONER

## 2023-08-03 PROCEDURE — 3044F HG A1C LEVEL LT 7.0%: CPT | Performed by: NURSE PRACTITIONER

## 2023-08-03 PROCEDURE — 99214 OFFICE O/P EST MOD 30 MIN: CPT | Performed by: NURSE PRACTITIONER

## 2023-08-03 PROCEDURE — 3074F SYST BP LT 130 MM HG: CPT | Performed by: NURSE PRACTITIONER

## 2023-08-03 ASSESSMENT — ENCOUNTER SYMPTOMS
SEIZURES: 0
FREQUENCY: 0
WEAKNESS: 0
SPEECH DIFFICULTY: 0
FATIGUE: 0
SORE THROAT: 0
NERVOUS/ANXIOUS: 0
BACK PAIN: 0
EYE REDNESS: 0
ABDOMINAL DISTENTION: 0
UNEXPECTED WEIGHT CHANGE: 0
SHORTNESS OF BREATH: 0
DIZZINESS: 0
CONSTIPATION: 0
APNEA: 0
NUMBNESS: 0
FACIAL ASYMMETRY: 0
DIFFICULTY URINATING: 0
HEMATURIA: 0
NECK PAIN: 0
PHOTOPHOBIA: 0
NAUSEA: 0
DYSURIA: 0
ARTHRALGIAS: 0
FLANK PAIN: 0
BLOOD IN STOOL: 0
EYE PAIN: 0
JOINT SWELLING: 0
SLEEP DISTURBANCE: 0
PALPITATIONS: 0
WOUND: 0
FEVER: 0
WHEEZING: 0
CONFUSION: 0
MYALGIAS: 0
ABDOMINAL PAIN: 0
CHOKING: 0
CHEST TIGHTNESS: 0
CHILLS: 0
COUGH: 0
VOMITING: 0
TROUBLE SWALLOWING: 0
HEADACHES: 0
DIARRHEA: 0

## 2023-08-03 NOTE — PROGRESS NOTES
"Subjective   Patient ID: Naveed Garcia is a 62 y.o. male who presents for Follow-up (F/U WITH MRI AND LABS).      HPI:  Presents today for [facility] LABS AND MRI BRAIN. NO NEW COMPLAINTS       DM- STABLE  THYROID- STABLE. US ORDERED  DEPRESSION/ANXIETY- STABLE  MEMORY- MRI BRAIN UNREMARKABLE. REFUSING NEURO REFERRAL AT THIS TIME  IRON- STABLE  B12- STOP    Visit Vitals  /68   Pulse 73   Ht 1.753 m (5' 9\")   Wt 75.8 kg (167 lb)   BMI 24.66 kg/m²   Smoking Status Every Day   BSA 1.92 m²        Review of Systems   Constitutional:  Negative for chills, fatigue, fever and unexpected weight change.   HENT:  Negative for congestion, ear pain, sore throat and trouble swallowing.    Eyes:  Negative for photophobia, pain, redness and visual disturbance.   Respiratory:  Negative for apnea, cough, choking, chest tightness, shortness of breath and wheezing.    Cardiovascular:  Negative for chest pain, palpitations and leg swelling.   Gastrointestinal:  Negative for abdominal distention, abdominal pain, blood in stool, constipation, diarrhea, nausea and vomiting.   Genitourinary:  Negative for difficulty urinating, dysuria, flank pain, frequency, hematuria and urgency.   Musculoskeletal:  Negative for arthralgias, back pain, gait problem, joint swelling, myalgias and neck pain.   Skin:  Negative for rash and wound.   Neurological:  Negative for dizziness, seizures, syncope, facial asymmetry, speech difficulty, weakness, numbness and headaches.   Psychiatric/Behavioral:  Negative for confusion, sleep disturbance and suicidal ideas. The patient is not nervous/anxious.        Objective   Study Result    Narrative & Impression   Interpreted By:  VIVEK GILL MD  MRN: [id number]  Patient Name: NAVEED GARCIA     STUDY:  MRI BRAIN W/WO CONTRAST     INDICATION:  MEMMORY ISSUES     COMPARISON:  None.     ACCESSION NUMBER(S):  [id number]     ORDERING CLINICIAN:  YAEL GALICIA     TECHNIQUE:  Multi-planar multi-sequential MR imaging of " the brain was performed  before and after the intravenous administration of contrast. 16 ml of  Dotarem was administered (the balance of single use vial(s) has/have  been discarded).     FINDINGS:  Parenchymal volume is grossly preserved for patient's age. Possible  mild bilateral hippocampal volume loss characterized by slight  prominence of the choroid fissures.  No acute infarction, intracranial hemorrhage or mass lesion. No  abnormal intracranial enhancement is identified.  No hydrocephalus.  No extra-axial fluid collections. The skull base  flow voids are present.     The visualized intraorbital contents are normal. The imaged portions  of the paranasal sinuses are clear. The mastoid air cells are clear.  The visualized osseous structures, soft tissues and partially  visualized parotid glands appear normal. Left frontal scalp lipoma  noted.     IMPRESSION:  Parenchymal volume is preserved. No lobar atrophy pattern to suggest  an underlying neurodegenerative process.     Possible mild bilateral hippocampal volume loss, which may be due to  age-related changes.     Component      Latest Ref Rng 7/27/2023   GLUCOSE      74 - 99 mg/dL 135 (H)    SODIUM      136 - 145 mmol/L 140    POTASSIUM      3.5 - 5.3 mmol/L 4.3    CHLORIDE      98 - 107 mmol/L 105    Bicarbonate      21 - 32 mmol/L 29    Anion Gap      10 - 20 mmol/L 10    Blood Urea Nitrogen      6 - 23 mg/dL 15    Creatinine      0.50 - 1.30 mg/dL 0.80    GFR MALE      >90 mL/min/1.73m2 >90    Calcium      8.6 - 10.3 mg/dL 9.1    Albumin      3.4 - 5.0 g/dL 4.4    Alkaline Phosphatase      33 - 136 U/L 88    Total Protein      6.4 - 8.2 g/dL 6.2 (L)    AST      9 - 39 U/L 13    Bilirubin Total      0.0 - 1.2 mg/dL 0.5    ALT      10 - 52 U/L 14    IRON      35 - 150 ug/dL 55    TIBC      240 - 445 ug/dL 295    % Saturation      25 - 45 % 19 (L)    ALBUMIN (MG/L) IN URINE      Not Established mg/L 28.1    Albumin/Creatine Ratio      0.0 - 30.0 ug/mg crt 13.6     Creatinine, Urine Random      20.0 - 370.0 mg/dL 207.0    Hemoglobin A1C      % 6.4 !    Estimated Average Glucose      MG/    Thyroid Stimulating Hormone      0.44 - 3.98 mIU/L 2.91    Vitamin B12      211 - 911 pg/mL 1235 (H)    Vitamin D, 25-Hydroxy, Total      ng/mL 42       Legend:  (H) High  (L) Low  ! Abnormal    Physical Exam  Constitutional:       Appearance: Normal appearance. He is normal weight.   HENT:      Head: Normocephalic.   Eyes:      Extraocular Movements: Extraocular movements intact.      Conjunctiva/sclera: Conjunctivae normal.      Pupils: Pupils are equal, round, and reactive to light.   Cardiovascular:      Rate and Rhythm: Normal rate and regular rhythm.      Pulses: Normal pulses.      Heart sounds: Normal heart sounds.   Pulmonary:      Effort: Pulmonary effort is normal.      Breath sounds: Normal breath sounds.   Musculoskeletal:         General: Normal range of motion.      Cervical back: Normal range of motion.   Skin:     General: Skin is warm and dry.   Neurological:      General: No focal deficit present.      Mental Status: He is alert and oriented to person, place, and time.   Psychiatric:         Mood and Affect: Mood normal.         Behavior: Behavior normal.         Thought Content: Thought content normal.         Judgment: Judgment normal.            Assessment/Plan   Problem List Items Addressed This Visit       Anxiety    Relevant Orders    CBC and Auto Differential    Comprehensive Metabolic Panel    Thyroid Stimulating Hormone    Thyroxine, Free    Triiodothyronine, Free    Vitamin D, Total    Vitamin B12    Iron and TIBC    Hemoglobin A1C    Parathyroid Hormone, Intact    Ferritin    Prostate Specific Antigen, Screen    Depression, major, single episode, moderate (CMS/HCC) - Primary    Relevant Orders    CBC and Auto Differential    Comprehensive Metabolic Panel    Thyroid Stimulating Hormone    Thyroxine, Free    Triiodothyronine, Free    Vitamin D, Total     Vitamin B12    Iron and TIBC    Hemoglobin A1C    Parathyroid Hormone, Intact    Ferritin    Prostate Specific Antigen, Screen    DM2 (diabetes mellitus, type 2) (CMS/[facility])    Relevant Orders    CBC and Auto Differential    Comprehensive Metabolic Panel    Thyroid Stimulating Hormone    Thyroxine, Free    Triiodothyronine, Free    Vitamin D, Total    Vitamin B12    Iron and TIBC    Hemoglobin A1C    Parathyroid Hormone, Intact    Ferritin    Prostate Specific Antigen, Screen    Hypothyroidism    Relevant Orders    US thyroid    CBC and Auto Differential    Comprehensive Metabolic Panel    Thyroid Stimulating Hormone    Thyroxine, Free    Triiodothyronine, Free    Vitamin D, Total    Vitamin B12    Iron and TIBC    Hemoglobin A1C    Parathyroid Hormone, Intact    Ferritin    Prostate Specific Antigen, Screen    Memory impairment    Relevant Orders    CBC and Auto Differential    Comprehensive Metabolic Panel    Thyroid Stimulating Hormone    Thyroxine, Free    Triiodothyronine, Free    Vitamin D, Total    Vitamin B12    Iron and TIBC    Hemoglobin A1C    Parathyroid Hormone, Intact    Ferritin    Prostate Specific Antigen, Screen    Screening PSA (prostate specific antigen)    Relevant Orders    CBC and Auto Differential    Comprehensive Metabolic Panel    Thyroid Stimulating Hormone    Thyroxine, Free    Triiodothyronine, Free    Vitamin D, Total    Vitamin B12    Iron and TIBC    Hemoglobin A1C    Parathyroid Hormone, Intact    Ferritin    Prostate Specific Antigen, Screen       WE DISCUSSED MOST COMMON SIDE EFFECTS OF PRESCRIBED MEDICATIONS. INDICATIONS, RISK, COMPLICATIONS, AND ALTERNATIVES OF MEDICATION/THERAPEUTICS WERE EXPLAINED AND DISCUSSED. PLEASE MONITOR CLOSELY FOR ANY UNTOWARD SIDE EFFECTS OR COMPLICATIONS OF MEDICATIONS. PATIENT IS STRONGLY ADVISED TO BE COMPLIANT WITH RECOMMENDATIONS. QUESTIONS AND CONCERNS WERE ADDRESSED. INSTRUCTED TO CALL, RETURN SOONER, OR GO TO THE ER,  IF SYMPTOMS PERSIST OR  WORSEN. THEY VOICED UNDERSTANDING AND  DENIES FURTHER QUESTIONS AT THIS TIME.    TIME CODE  1. PREPARATION FOR PATIENT'S VISIT (REVIEWING CHART, CURRENT MEDICAL RECORDS, OUTSIDE HEALTH PROVIDER RECORDS, PREVIOUS HISTORY, EXAM, TEST, PROCEDURE, AND MEDICATIONS)  2. FACE TO FACE ENCOUNTER OBTAINING HISTORY FROM THE PATIENT/FAMILY/CAREGIVERS; PERFORMING EVALUATION AND EXAMINATION; ORDERING TESTS OR PROCEDURES; REFERRING AND COMMUNICATING WITH OTHER HEALTHCARE PROVIDERS; COUNSELING AND EDUCATION OF THE PATIENT/FAMILY/CAREGIVERS; INDEPENDENTLY INTERPRETING RESULTS (TESTS, LABS, PROCEDURES, IMAGING) AND COMMUNICATING AND EXPLAINING RESULTS TO THE PATIENT/FAMILY/CAREGIVERS  3. COORDINATION OF CARE; PREPARING AND PRINTING DISCHARGE INSTRUCTIONS AND ANY EDUCATIONAL MATERIAL FOR THE PATIENT/FAMILY/CAREGIVERS. DOCUMENTING CLINICAL INFORMATION IN THE ELECTRONIC MEDICAL RECORD   4. REVIEWING OARRS AS NEEDED    MDM  1) COMPLEXITY: MORE THAN 1 STABLE CHRONIC CONDITION ADDRESSED OR 1 ACUTE ILLNESS ADDRESSED   2)DATA: TESTS INTERPRETED AND OR ORDERED, TOOK INDEPENDENT HISTORY OR RECORDS REVIEWED  3)RISK: MODERATE RISK DUE TO NATURE OF MEDICAL CONDITIONS/COMORBIDITY OR MEDICATIONS ORDERED OR SURGICAL OR PROCEDURE REFERRAL    6 MONTHS WITH LABS

## 2023-10-17 ENCOUNTER — APPOINTMENT (OUTPATIENT)
Dept: PRIMARY CARE | Facility: CLINIC | Age: 63
End: 2023-10-17
Payer: MEDICAID

## 2023-10-19 ENCOUNTER — OFFICE VISIT (OUTPATIENT)
Dept: PRIMARY CARE | Facility: CLINIC | Age: 63
End: 2023-10-19
Payer: MEDICAID

## 2023-10-19 VITALS
WEIGHT: 168 LBS | HEART RATE: 74 BPM | SYSTOLIC BLOOD PRESSURE: 126 MMHG | BODY MASS INDEX: 24.88 KG/M2 | HEIGHT: 69 IN | DIASTOLIC BLOOD PRESSURE: 78 MMHG

## 2023-10-19 DIAGNOSIS — F31.9 BIPOLAR 1 DISORDER, DEPRESSED (MULTI): Primary | ICD-10-CM

## 2023-10-19 DIAGNOSIS — F32.1 DEPRESSION, MAJOR, SINGLE EPISODE, MODERATE (MULTI): ICD-10-CM

## 2023-10-19 DIAGNOSIS — F41.9 ANXIETY: ICD-10-CM

## 2023-10-19 PROCEDURE — 3074F SYST BP LT 130 MM HG: CPT | Performed by: NURSE PRACTITIONER

## 2023-10-19 PROCEDURE — 4004F PT TOBACCO SCREEN RCVD TLK: CPT | Performed by: NURSE PRACTITIONER

## 2023-10-19 PROCEDURE — 3078F DIAST BP <80 MM HG: CPT | Performed by: NURSE PRACTITIONER

## 2023-10-19 PROCEDURE — 3044F HG A1C LEVEL LT 7.0%: CPT | Performed by: NURSE PRACTITIONER

## 2023-10-19 PROCEDURE — 99214 OFFICE O/P EST MOD 30 MIN: CPT | Performed by: NURSE PRACTITIONER

## 2023-10-19 RX ORDER — ARIPIPRAZOLE 20 MG/1
20 TABLET ORAL DAILY
Qty: 90 TABLET | Refills: 1 | Status: SHIPPED | OUTPATIENT
Start: 2023-10-19 | End: 2024-04-18 | Stop reason: SDUPTHER

## 2023-10-19 ASSESSMENT — ENCOUNTER SYMPTOMS
FLANK PAIN: 0
HEADACHES: 0
FREQUENCY: 0
NECK PAIN: 0
CHEST TIGHTNESS: 0
EYE REDNESS: 0
SHORTNESS OF BREATH: 0
BLOOD IN STOOL: 0
SORE THROAT: 0
EYE PAIN: 0
FEVER: 0
PALPITATIONS: 0
UNEXPECTED WEIGHT CHANGE: 0
JOINT SWELLING: 0
FATIGUE: 0
CONSTIPATION: 0
CHOKING: 0
VOMITING: 0
DIZZINESS: 0
BACK PAIN: 0
SEIZURES: 0
SPEECH DIFFICULTY: 0
HEMATURIA: 0
NAUSEA: 0
DIARRHEA: 0
COUGH: 0
WHEEZING: 0
WOUND: 0
PHOTOPHOBIA: 0
WEAKNESS: 0
MYALGIAS: 0
TROUBLE SWALLOWING: 0
SLEEP DISTURBANCE: 0
NERVOUS/ANXIOUS: 0
CHILLS: 0
ARTHRALGIAS: 0
DYSURIA: 0
DIFFICULTY URINATING: 0
CONFUSION: 0
APNEA: 0
NUMBNESS: 0
ABDOMINAL DISTENTION: 0
FACIAL ASYMMETRY: 0
ABDOMINAL PAIN: 0

## 2023-10-19 ASSESSMENT — PATIENT HEALTH QUESTIONNAIRE - PHQ9
2. FEELING DOWN, DEPRESSED OR HOPELESS: NOT AT ALL
1. LITTLE INTEREST OR PLEASURE IN DOING THINGS: NOT AT ALL
SUM OF ALL RESPONSES TO PHQ9 QUESTIONS 1 AND 2: 0

## 2023-10-19 NOTE — PROGRESS NOTES
"Subjective   Patient ID: Naveed Brown is a 62 y.o. male who presents for Follow-up (6 months. No concerns).      HPI:  Presents today for 6 MONTH GENERAL CHECK UP.  C/O INCREASE DEPRESSION OVER THE PAST FEW MONTHS.  modifying factors consists of HAS DEPRESSION, BIPOLAR, AND ANXIETY  associated symptoms consist of DENIES SUICIDAL IDEATION  prior treatment consists of medication ABILIFY 10 MG  AND PROZAC 20 MG       BIPOLAR/ANXIETY- INCREASE ABILIFY 20 MG. REFUSING PSYCH REFERRAL AT THIS TIME      Visit Vitals  /78 (BP Location: Right arm, Patient Position: Sitting)   Pulse 74   Ht 1.753 m (5' 9\")   Wt 76.2 kg (168 lb)   BMI 24.81 kg/m²   Smoking Status Every Day   BSA 1.93 m²        Review of Systems   Constitutional:  Negative for chills, fatigue, fever and unexpected weight change.   HENT:  Negative for congestion, ear pain, sore throat and trouble swallowing.    Eyes:  Negative for photophobia, pain, redness and visual disturbance.   Respiratory:  Negative for apnea, cough, choking, chest tightness, shortness of breath and wheezing.    Cardiovascular:  Negative for chest pain, palpitations and leg swelling.   Gastrointestinal:  Negative for abdominal distention, abdominal pain, blood in stool, constipation, diarrhea, nausea and vomiting.   Genitourinary:  Negative for difficulty urinating, dysuria, flank pain, frequency, hematuria and urgency.   Musculoskeletal:  Negative for arthralgias, back pain, gait problem, joint swelling, myalgias and neck pain.   Skin:  Negative for rash and wound.   Neurological:  Negative for dizziness, seizures, syncope, facial asymmetry, speech difficulty, weakness, numbness and headaches.   Psychiatric/Behavioral:  Negative for confusion, sleep disturbance and suicidal ideas. The patient is not nervous/anxious.        Objective     Physical Exam  Constitutional:       Appearance: Normal appearance. He is normal weight.   HENT:      Head: Normocephalic.   Eyes:      " Extraocular Movements: Extraocular movements intact.      Conjunctiva/sclera: Conjunctivae normal.      Pupils: Pupils are equal, round, and reactive to light.   Cardiovascular:      Rate and Rhythm: Normal rate and regular rhythm.      Pulses: Normal pulses.      Heart sounds: Normal heart sounds.   Pulmonary:      Effort: Pulmonary effort is normal.      Breath sounds: Normal breath sounds.   Musculoskeletal:         General: Normal range of motion.      Cervical back: Normal range of motion.   Skin:     General: Skin is warm and dry.   Neurological:      General: No focal deficit present.      Mental Status: He is alert and oriented to person, place, and time.   Psychiatric:         Mood and Affect: Mood normal.         Behavior: Behavior normal.         Thought Content: Thought content normal.         Judgment: Judgment normal.            Assessment/Plan   Problem List Items Addressed This Visit       Anxiety    Relevant Medications    ARIPiprazole (Abilify) 20 mg tablet    Depression, major, single episode, moderate (CMS/HCC)    Relevant Medications    ARIPiprazole (Abilify) 20 mg tablet    Bipolar 1 disorder, depressed (CMS/HCC) - Primary   REFUSING SOONER FU APPT AT THIS TIME.  INSTRUCTED TO call in 2 weeks with med update     WE DISCUSSED MOST COMMON SIDE EFFECTS OF PRESCRIBED MEDICATIONS. INDICATIONS, RISK, COMPLICATIONS, AND ALTERNATIVES OF MEDICATION/THERAPEUTICS WERE EXPLAINED AND DISCUSSED. PLEASE MONITOR CLOSELY FOR ANY UNTOWARD SIDE EFFECTS OR COMPLICATIONS OF MEDICATIONS. PATIENT IS STRONGLY ADVISED TO BE COMPLIANT WITH RECOMMENDATIONS. QUESTIONS AND CONCERNS WERE ADDRESSED. INSTRUCTED TO CALL, RETURN SOONER, OR GO TO THE ER,  IF SYMPTOMS PERSIST OR WORSEN. THEY VOICED UNDERSTANDING AND  DENIES FURTHER QUESTIONS AT THIS TIME.      TIME CODE  1. PREPARATION FOR PATIENT'S VISIT (REVIEWING CHART, CURRENT MEDICAL RECORDS, OUTSIDE HEALTH PROVIDER RECORDS, PREVIOUS HISTORY, EXAM, TEST, PROCEDURE, AND  MEDICATIONS)  2. FACE TO FACE ENCOUNTER OBTAINING HISTORY FROM THE PATIENT/FAMILY/CAREGIVERS; PERFORMING EVALUATION AND EXAMINATION; ORDERING TESTS OR PROCEDURES; REFERRING AND COMMUNICATING WITH OTHER HEALTHCARE PROVIDERS; COUNSELING AND EDUCATION OF THE PATIENT/FAMILY/CAREGIVERS; INDEPENDENTLY INTERPRETING RESULTS (TESTS, LABS, PROCEDURES, IMAGING) AND COMMUNICATING AND EXPLAINING RESULTS TO THE PATIENT/FAMILY/CAREGIVERS  3. COORDINATION OF CARE; PREPARING AND PRINTING DISCHARGE INSTRUCTIONS AND ANY EDUCATIONAL MATERIAL FOR THE PATIENT/FAMILY/CAREGIVERS. DOCUMENTING CLINICAL INFORMATION IN THE ELECTRONIC MEDICAL RECORD   4. REVIEWING OARRS AS NEEDED      MDM  1) COMPLEXITY: MORE THAN 1 STABLE CHRONIC CONDITION ADDRESSED OR 1 ACUTE ILLNESS ADDRESSED   2)DATA: TESTS INTERPRETED AND OR ORDERED, TOOK INDEPENDENT HISTORY OR RECORDS REVIEWED  3)RISK: MODERATE RISK DUE TO NATURE OF MEDICAL CONDITIONS/COMORBIDITY OR MEDICATIONS ORDERED OR SURGICAL OR PROCEDURE REFERRAL    Follow up as before

## 2024-02-01 ENCOUNTER — OFFICE VISIT (OUTPATIENT)
Dept: PRIMARY CARE | Facility: CLINIC | Age: 64
End: 2024-02-01
Payer: MEDICAID

## 2024-02-01 VITALS
HEIGHT: 69 IN | SYSTOLIC BLOOD PRESSURE: 138 MMHG | WEIGHT: 172 LBS | HEART RATE: 76 BPM | DIASTOLIC BLOOD PRESSURE: 78 MMHG | BODY MASS INDEX: 25.48 KG/M2

## 2024-02-01 DIAGNOSIS — R41.3 MEMORY IMPAIRMENT: ICD-10-CM

## 2024-02-01 DIAGNOSIS — E11.69 MIXED HYPERLIPIDEMIA DUE TO TYPE 2 DIABETES MELLITUS (MULTI): ICD-10-CM

## 2024-02-01 DIAGNOSIS — I25.10 CAD IN NATIVE ARTERY: Primary | ICD-10-CM

## 2024-02-01 DIAGNOSIS — E78.2 MIXED HYPERLIPIDEMIA DUE TO TYPE 2 DIABETES MELLITUS (MULTI): ICD-10-CM

## 2024-02-01 DIAGNOSIS — F31.9 BIPOLAR 1 DISORDER, DEPRESSED (MULTI): ICD-10-CM

## 2024-02-01 DIAGNOSIS — E55.9 VITAMIN D DEFICIENCY: ICD-10-CM

## 2024-02-01 DIAGNOSIS — J43.9 PULMONARY EMPHYSEMA, UNSPECIFIED EMPHYSEMA TYPE (MULTI): ICD-10-CM

## 2024-02-01 DIAGNOSIS — E03.9 HYPOTHYROIDISM, UNSPECIFIED TYPE: ICD-10-CM

## 2024-02-01 PROCEDURE — 3075F SYST BP GE 130 - 139MM HG: CPT | Performed by: NURSE PRACTITIONER

## 2024-02-01 PROCEDURE — 3078F DIAST BP <80 MM HG: CPT | Performed by: NURSE PRACTITIONER

## 2024-02-01 PROCEDURE — 99213 OFFICE O/P EST LOW 20 MIN: CPT | Performed by: NURSE PRACTITIONER

## 2024-02-01 RX ORDER — ATORVASTATIN CALCIUM 40 MG/1
40 TABLET, FILM COATED ORAL NIGHTLY
Qty: 90 TABLET | Refills: 3 | Status: SHIPPED | OUTPATIENT
Start: 2024-02-01

## 2024-02-01 RX ORDER — ERGOCALCIFEROL 1.25 MG/1
1 CAPSULE ORAL
Qty: 13 CAPSULE | Refills: 3 | Status: SHIPPED | OUTPATIENT
Start: 2024-02-01

## 2024-02-01 RX ORDER — LEVOTHYROXINE SODIUM 25 UG/1
25 TABLET ORAL DAILY
Qty: 90 TABLET | Refills: 3 | Status: SHIPPED | OUTPATIENT
Start: 2024-02-01

## 2024-02-01 RX ORDER — CLOPIDOGREL BISULFATE 75 MG/1
75 TABLET ORAL DAILY
Qty: 90 TABLET | Refills: 3 | Status: SHIPPED | OUTPATIENT
Start: 2024-02-01

## 2024-02-01 ASSESSMENT — ENCOUNTER SYMPTOMS
VOMITING: 0
HEMATURIA: 0
DIFFICULTY URINATING: 0
MYALGIAS: 0
UNEXPECTED WEIGHT CHANGE: 0
FEVER: 0
CHEST TIGHTNESS: 0
PHOTOPHOBIA: 0
JOINT SWELLING: 0
NUMBNESS: 0
FREQUENCY: 0
CONSTIPATION: 0
FACIAL ASYMMETRY: 0
SORE THROAT: 0
ARTHRALGIAS: 0
CHILLS: 0
SLEEP DISTURBANCE: 0
CONFUSION: 0
DYSURIA: 0
ABDOMINAL PAIN: 0
NERVOUS/ANXIOUS: 0
TROUBLE SWALLOWING: 0
PALPITATIONS: 0
SPEECH DIFFICULTY: 0
FLANK PAIN: 0
CHOKING: 0
EYE REDNESS: 0
WHEEZING: 0
EYE PAIN: 0
BACK PAIN: 0
SEIZURES: 0
WEAKNESS: 0
BLOOD IN STOOL: 0
ABDOMINAL DISTENTION: 0
WOUND: 0
FATIGUE: 0
COUGH: 0
APNEA: 0
DIARRHEA: 0
SHORTNESS OF BREATH: 0
NECK PAIN: 0
DIZZINESS: 0
NAUSEA: 0
HEADACHES: 0

## 2024-02-01 ASSESSMENT — PATIENT HEALTH QUESTIONNAIRE - PHQ9
SUM OF ALL RESPONSES TO PHQ9 QUESTIONS 1 AND 2: 0
2. FEELING DOWN, DEPRESSED OR HOPELESS: NOT AT ALL
1. LITTLE INTEREST OR PLEASURE IN DOING THINGS: NOT AT ALL

## 2024-02-01 NOTE — PROGRESS NOTES
"Subjective   Patient ID: Naveed Brown is a 63 y.o. male who presents for Follow-up (6 months . Pt states he would like a neuro referral ).      HPI:  Presents today for C/O MEMORY  LOSS THAT REMAINS modifying factors consists of MRI BRAIN FROM 7/20/23 SHOWS VOLUME LOSS   associated symptoms consist of STATES HE DOES NOT REMEMBER A LOT OF DAY TO DAY THINGS. SHORT TERM MEMORY LOSS. prior treatment consists of medication NONE       ANXIETY/DEPRESSION- STILL FEELING DOWN. REFUSING MED MANAGEMENT AND PSYCH REFERRAL   COPD- STABLE     Visit Vitals  /78 (BP Location: Left arm, Patient Position: Sitting)   Pulse 76   Ht 1.753 m (5' 9\")   Wt 78 kg (172 lb)   BMI 25.40 kg/m²   Smoking Status Every Day   BSA 1.95 m²        Review of Systems   Constitutional:  Negative for chills, fatigue, fever and unexpected weight change.   HENT:  Negative for congestion, ear pain, sore throat and trouble swallowing.    Eyes:  Negative for photophobia, pain, redness and visual disturbance.   Respiratory:  Negative for apnea, cough, choking, chest tightness, shortness of breath and wheezing.    Cardiovascular:  Negative for chest pain, palpitations and leg swelling.   Gastrointestinal:  Negative for abdominal distention, abdominal pain, blood in stool, constipation, diarrhea, nausea and vomiting.   Genitourinary:  Negative for difficulty urinating, dysuria, flank pain, frequency, hematuria and urgency.   Musculoskeletal:  Negative for arthralgias, back pain, gait problem, joint swelling, myalgias and neck pain.   Skin:  Negative for rash and wound.   Neurological:  Negative for dizziness, seizures, syncope, facial asymmetry, speech difficulty, weakness, numbness and headaches.   Psychiatric/Behavioral:  Negative for confusion, sleep disturbance and suicidal ideas. The patient is not nervous/anxious.         MEMORY LOSS       Objective   Study Result    Narrative & Impression   Interpreted By:  VIVEK GILL MD  MRN: " [id number]  Patient Name: DIAMOND GARCIA     STUDY:  MRI BRAIN W/WO CONTRAST     INDICATION:  MEMMORY ISSUES     COMPARISON:  None.     ACCESSION NUMBER(S):  [id number]     ORDERING CLINICIAN:  YAEL GALICIA     TECHNIQUE:  Multi-planar multi-sequential MR imaging of the brain was performed  before and after the intravenous administration of contrast. 16 ml of  Dotarem was administered (the balance of single use vial(s) has/have  been discarded).     FINDINGS:  Parenchymal volume is grossly preserved for patient's age. Possible  mild bilateral hippocampal volume loss characterized by slight  prominence of the choroid fissures.  No acute infarction, intracranial hemorrhage or mass lesion. No  abnormal intracranial enhancement is identified.  No hydrocephalus.  No extra-axial fluid collections. The skull base  flow voids are present.     The visualized intraorbital contents are normal. The imaged portions  of the paranasal sinuses are clear. The mastoid air cells are clear.  The visualized osseous structures, soft tissues and partially  visualized parotid glands appear normal. Left frontal scalp lipoma  noted.     IMPRESSION:  Parenchymal volume is preserved. No lobar atrophy pattern to suggest  an underlying neurodegenerative process.     Possible mild bilateral hippocampal volume loss, which may be due to  age-related changes.     No acute intracranial abnormality. No abnormal enhancement.     Physical Exam  Constitutional:       Appearance: Normal appearance. He is normal weight.   HENT:      Head: Normocephalic.   Eyes:      Extraocular Movements: Extraocular movements intact.      Conjunctiva/sclera: Conjunctivae normal.      Pupils: Pupils are equal, round, and reactive to light.   Cardiovascular:      Rate and Rhythm: Normal rate and regular rhythm.      Pulses: Normal pulses.      Heart sounds: Normal heart sounds.   Pulmonary:      Effort: Pulmonary effort is normal.      Breath sounds: Normal breath sounds.    Musculoskeletal:         General: Normal range of motion.      Cervical back: Normal range of motion.   Skin:     General: Skin is warm and dry.   Neurological:      General: No focal deficit present.      Mental Status: He is alert and oriented to person, place, and time.   Psychiatric:         Mood and Affect: Mood normal.         Behavior: Behavior normal.         Thought Content: Thought content normal.         Judgment: Judgment normal.            Assessment/Plan   Problem List Items Addressed This Visit       CAD in native artery - Primary    Relevant Medications    clopidogrel (Plavix) 75 mg tablet    Emphysema/COPD (CMS/[facility])    Mixed hyperlipidemia due to type 2 diabetes mellitus (CMS/[facility])    Relevant Medications    atorvastatin (Lipitor) 40 mg tablet    Hypothyroidism    Relevant Medications    levothyroxine (Synthroid, Levoxyl) 25 mcg tablet    Vitamin D deficiency    Relevant Medications    ergocalciferol (Vitamin D-2) 1.25 MG (03735 UT) capsule    Memory impairment    Relevant Orders    Referral to Neurology    Bipolar 1 disorder, depressed (CMS/[facility])   INSTRUCTED TO DO LABS AND I HAVE AGREED TO CALL WITH LAB RESULTS     PLEASE MONITOR CLOSELY FOR ANY UNTOWARD SIDE EFFECTS OR COMPLICATIONS OF MEDICATIONS. PATIENT IS STRONGLY ADVISED TO BE COMPLIANT WITH RECOMMENDATIONS. QUESTIONS AND CONCERNS WERE ADDRESSED. INSTRUCTED TO CALL, RETURN SOONER, OR GO TO THE ER,  IF SYMPTOMS PERSIST OR WORSEN. THEY VOICED UNDERSTANDING AND  DENIES FURTHER QUESTIONS AT THIS TIME.    TIME CODE  1. PREPARATION FOR PATIENT'S VISIT (REVIEWING CHART, CURRENT MEDICAL RECORDS, OUTSIDE HEALTH PROVIDER RECORDS, PREVIOUS HISTORY, EXAM, TEST, PROCEDURE, AND MEDICATIONS)  2. FACE TO FACE ENCOUNTER OBTAINING HISTORY FROM THE PATIENT/FAMILY/CAREGIVERS; PERFORMING EVALUATION AND EXAMINATION; ORDERING TESTS OR PROCEDURES; REFERRING AND COMMUNICATING WITH OTHER HEALTHCARE PROVIDERS; COUNSELING AND EDUCATION OF THE PATIENT/FAMILY/CAREGIVERS;  INDEPENDENTLY INTERPRETING RESULTS (TESTS, LABS, PROCEDURES, IMAGING) AND COMMUNICATING AND EXPLAINING RESULTS TO THE PATIENT/FAMILY/CAREGIVERS  3. COORDINATION OF CARE; PREPARING AND PRINTING DISCHARGE INSTRUCTIONS AND ANY EDUCATIONAL MATERIAL FOR THE PATIENT/FAMILY/CAREGIVERS. DOCUMENTING CLINICAL INFORMATION IN THE ELECTRONIC MEDICAL RECORD   4. REVIEWING OARRS AS NEEDED    MDM  1) COMPLEXITY: MORE THAN 1 STABLE CHRONIC CONDITION ADDRESSED OR 1 ACUTE ILLNESS ADDRESSED   2)DATA: TESTS INTERPRETED AND OR ORDERED, TOOK INDEPENDENT HISTORY OR RECORDS REVIEWED  3)RISK: MODERATE RISK DUE TO NATURE OF MEDICAL CONDITIONS/COMORBIDITY OR MEDICATIONS ORDERED OR SURGICAL OR PROCEDURE REFERRAL    AFTER REFERRAL

## 2024-04-18 DIAGNOSIS — F32.1 DEPRESSION, MAJOR, SINGLE EPISODE, MODERATE (MULTI): ICD-10-CM

## 2024-04-18 DIAGNOSIS — E11.69 TYPE 2 DIABETES MELLITUS WITH OTHER SPECIFIED COMPLICATION, UNSPECIFIED WHETHER LONG TERM INSULIN USE (MULTI): Primary | ICD-10-CM

## 2024-04-18 DIAGNOSIS — F41.9 ANXIETY: ICD-10-CM

## 2024-04-18 RX ORDER — ARIPIPRAZOLE 20 MG/1
20 TABLET ORAL DAILY
Qty: 30 TABLET | Refills: 0 | Status: SHIPPED | OUTPATIENT
Start: 2024-04-18

## 2024-04-18 RX ORDER — METFORMIN HYDROCHLORIDE 1000 MG/1
1000 TABLET ORAL EVERY 12 HOURS
Qty: 60 TABLET | Refills: 0 | Status: SHIPPED | OUTPATIENT
Start: 2024-04-18

## 2024-08-02 DIAGNOSIS — F41.9 ANXIETY: ICD-10-CM

## 2024-08-02 DIAGNOSIS — F32.1 DEPRESSION, MAJOR, SINGLE EPISODE, MODERATE (MULTI): ICD-10-CM

## 2024-08-02 DIAGNOSIS — E11.69 TYPE 2 DIABETES MELLITUS WITH OTHER SPECIFIED COMPLICATION, UNSPECIFIED WHETHER LONG TERM INSULIN USE (MULTI): ICD-10-CM

## 2024-08-02 RX ORDER — ARIPIPRAZOLE 20 MG/1
20 TABLET ORAL DAILY
Qty: 30 TABLET | Refills: 0 | Status: SHIPPED | OUTPATIENT
Start: 2024-08-02

## 2024-08-02 RX ORDER — FLUOXETINE HYDROCHLORIDE 20 MG/1
20 CAPSULE ORAL DAILY
Qty: 30 CAPSULE | Refills: 0 | Status: SHIPPED | OUTPATIENT
Start: 2024-08-02 | End: 2025-07-28

## 2024-08-02 RX ORDER — METFORMIN HYDROCHLORIDE 1000 MG/1
1000 TABLET ORAL EVERY 12 HOURS
Qty: 60 TABLET | Refills: 0 | Status: SHIPPED | OUTPATIENT
Start: 2024-08-02

## 2024-10-16 ENCOUNTER — APPOINTMENT (OUTPATIENT)
Dept: PRIMARY CARE | Facility: CLINIC | Age: 64
End: 2024-10-16
Payer: MEDICAID

## 2024-10-16 VITALS
BODY MASS INDEX: 28.05 KG/M2 | WEIGHT: 189.4 LBS | DIASTOLIC BLOOD PRESSURE: 72 MMHG | SYSTOLIC BLOOD PRESSURE: 113 MMHG | HEART RATE: 54 BPM | HEIGHT: 69 IN

## 2024-10-16 DIAGNOSIS — Z12.5 SCREENING PSA (PROSTATE SPECIFIC ANTIGEN): ICD-10-CM

## 2024-10-16 DIAGNOSIS — E61.1 LOW IRON: ICD-10-CM

## 2024-10-16 DIAGNOSIS — E53.8 VITAMIN B12 DEFICIENCY: ICD-10-CM

## 2024-10-16 DIAGNOSIS — E55.9 VITAMIN D DEFICIENCY: ICD-10-CM

## 2024-10-16 DIAGNOSIS — E78.2 MIXED HYPERLIPIDEMIA DUE TO TYPE 2 DIABETES MELLITUS (MULTI): ICD-10-CM

## 2024-10-16 DIAGNOSIS — F41.9 ANXIETY: ICD-10-CM

## 2024-10-16 DIAGNOSIS — E11.69 MIXED HYPERLIPIDEMIA DUE TO TYPE 2 DIABETES MELLITUS (MULTI): ICD-10-CM

## 2024-10-16 DIAGNOSIS — F32.1 DEPRESSION, MAJOR, SINGLE EPISODE, MODERATE (MULTI): ICD-10-CM

## 2024-10-16 DIAGNOSIS — F31.9 BIPOLAR 1 DISORDER, DEPRESSED (MULTI): Primary | ICD-10-CM

## 2024-10-16 PROCEDURE — 99214 OFFICE O/P EST MOD 30 MIN: CPT | Performed by: NURSE PRACTITIONER

## 2024-10-16 PROCEDURE — 3074F SYST BP LT 130 MM HG: CPT | Performed by: NURSE PRACTITIONER

## 2024-10-16 PROCEDURE — 3078F DIAST BP <80 MM HG: CPT | Performed by: NURSE PRACTITIONER

## 2024-10-16 PROCEDURE — 3008F BODY MASS INDEX DOCD: CPT | Performed by: NURSE PRACTITIONER

## 2024-10-16 RX ORDER — ARIPIPRAZOLE 20 MG/1
20 TABLET ORAL DAILY
Qty: 90 TABLET | Refills: 1 | Status: SHIPPED | OUTPATIENT
Start: 2024-10-16

## 2024-10-16 RX ORDER — FLUOXETINE HYDROCHLORIDE 20 MG/1
20 CAPSULE ORAL DAILY
Qty: 90 CAPSULE | Refills: 1 | Status: SHIPPED | OUTPATIENT
Start: 2024-10-16

## 2024-10-16 ASSESSMENT — ENCOUNTER SYMPTOMS
ARTHRALGIAS: 0
FREQUENCY: 0
PALPITATIONS: 0
DIFFICULTY URINATING: 0
MYALGIAS: 0
HEMATURIA: 0
WHEEZING: 0
UNEXPECTED WEIGHT CHANGE: 0
NECK PAIN: 0
NAUSEA: 0
DIZZINESS: 0
JOINT SWELLING: 0
DIARRHEA: 0
ABDOMINAL PAIN: 0
APNEA: 0
SPEECH DIFFICULTY: 0
VOMITING: 0
HEADACHES: 0
FATIGUE: 0
FEVER: 0
SHORTNESS OF BREATH: 0
SLEEP DISTURBANCE: 0
NERVOUS/ANXIOUS: 0
WOUND: 0
CHEST TIGHTNESS: 0
EYE PAIN: 0
BACK PAIN: 0
FACIAL ASYMMETRY: 0
SEIZURES: 0
BLOOD IN STOOL: 0
SORE THROAT: 0
TROUBLE SWALLOWING: 0
ABDOMINAL DISTENTION: 0
PHOTOPHOBIA: 0
COUGH: 0
FLANK PAIN: 0
CHILLS: 0
EYE REDNESS: 0
DYSURIA: 0
CHOKING: 0
WEAKNESS: 0
CONSTIPATION: 0
NUMBNESS: 0
CONFUSION: 0

## 2024-10-16 ASSESSMENT — COLUMBIA-SUICIDE SEVERITY RATING SCALE - C-SSRS
6. HAVE YOU EVER DONE ANYTHING, STARTED TO DO ANYTHING, OR PREPARED TO DO ANYTHING TO END YOUR LIFE?: NO
1. IN THE PAST MONTH, HAVE YOU WISHED YOU WERE DEAD OR WISHED YOU COULD GO TO SLEEP AND NOT WAKE UP?: NO
2. HAVE YOU ACTUALLY HAD ANY THOUGHTS OF KILLING YOURSELF?: NO

## 2024-10-16 ASSESSMENT — PATIENT HEALTH QUESTIONNAIRE - PHQ9: 1. LITTLE INTEREST OR PLEASURE IN DOING THINGS: NEARLY EVERY DAY

## 2024-10-16 NOTE — PROGRESS NOTES
"Subjective   Patient ID: Naveed Brown is a 63 y.o. male who presents for Follow-up (F/U FOR DEPRESSION. STILL NOT DOING WELL BUT HAS SEEN A SPECIALIST. STATES HE HAS BEEN TAKING HIS MEDS BUT HAS BEEN OUT FOR A WHILE).    Virtual or Telephone Consent    A telephone visit (audio only) between the patient (at the originating site) and the provider (at the distant site) was utilized to provide this telehealth service. VIRTUAL PLATFORM DOWN. UNABLE TO DO VIDEO.   Verbal consent was requested and obtained from Naveed Brown on this date, 10/16/24 for a telehealth visit.     HPI:  Presents today for TO RESTART MEDS BIPOLAR, ANXIETY AND DEPRESSION. HE HAS BEEN OUT FOR ABOUT 3 WEEKS AND HE DOES NOT TAKE HIS MEDS REGULARLY. HE DID SEE NEURO AND THEY AGREE THAT HE NEEDS TO SEE PSYCH AND HE IS NOW IN AGREEMENT TO SEE PSYCH. NO SUICIDAL IDEATION. NO FURTHER COMPLAINTS       Visit Vitals  /72   Pulse 54   Ht 1.753 m (5' 9\")   Wt 85.9 kg (189 lb 6.4 oz)   BMI 27.97 kg/m²   Smoking Status Former   BSA 2.05 m²        Review of Systems   Constitutional:  Negative for chills, fatigue, fever and unexpected weight change.   HENT:  Negative for congestion, ear pain, sore throat and trouble swallowing.    Eyes:  Negative for photophobia, pain, redness and visual disturbance.   Respiratory:  Negative for apnea, cough, choking, chest tightness, shortness of breath and wheezing.    Cardiovascular:  Negative for chest pain, palpitations and leg swelling.   Gastrointestinal:  Negative for abdominal distention, abdominal pain, blood in stool, constipation, diarrhea, nausea and vomiting.   Genitourinary:  Negative for difficulty urinating, dysuria, flank pain, frequency, hematuria and urgency.   Musculoskeletal:  Negative for arthralgias, back pain, gait problem, joint swelling, myalgias and neck pain.   Skin:  Negative for rash and wound.   Neurological:  Negative for dizziness, seizures, syncope, facial asymmetry, speech " difficulty, weakness, numbness and headaches.   Psychiatric/Behavioral:  Negative for confusion, sleep disturbance and suicidal ideas. The patient is not nervous/anxious.        Objective     Physical Exam  Neurological:      Mental Status: He is oriented to person, place, and time.   Psychiatric:         Thought Content: Thought content normal.            Assessment/Plan   Problem List Items Addressed This Visit       Anxiety    Relevant Medications    ARIPiprazole (Abilify) 20 mg tablet    Other Relevant Orders    Referral to Psychiatry    Depression, major, single episode, moderate (Multi)    Relevant Medications    FLUoxetine (PROzac) 20 mg capsule    ARIPiprazole (Abilify) 20 mg tablet    Other Relevant Orders    Referral to Psychiatry    Bipolar 1 disorder, depressed (Multi) - Primary    Relevant Orders    Referral to Psychiatry   WE DISCUSSED MOST COMMON SIDE EFFECTS OF PRESCRIBED MEDICATIONS. INDICATIONS, RISK, COMPLICATIONS, AND ALTERNATIVES OF MEDICATION/THERAPEUTICS WERE EXPLAINED AND DISCUSSED. PLEASE MONITOR CLOSELY FOR ANY UNTOWARD SIDE EFFECTS OR COMPLICATIONS OF MEDICATIONS. PATIENT IS STRONGLY ADVISED TO BE COMPLIANT WITH RECOMMENDATIONS. QUESTIONS AND CONCERNS WERE ADDRESSED. INSTRUCTED TO CALL, RETURN SOONER, OR GO TO THE ER,  IF SYMPTOMS PERSIST OR WORSEN. THEY VOICED UNDERSTANDING AND  DENIES FURTHER QUESTIONS AT THIS TIME.    TIME CODE  1. PREPARATION FOR PATIENT'S VISIT (REVIEWING CHART, CURRENT MEDICAL RECORDS, OUTSIDE HEALTH PROVIDER RECORDS, PREVIOUS HISTORY, EXAM, TEST, PROCEDURE, AND MEDICATIONS)  2. FACE TO FACE ENCOUNTER OBTAINING HISTORY FROM THE PATIENT/FAMILY/CAREGIVERS; PERFORMING EVALUATION AND EXAMINATION; ORDERING TESTS OR PROCEDURES; REFERRING AND COMMUNICATING WITH OTHER HEALTHCARE PROVIDERS; COUNSELING AND EDUCATION OF THE PATIENT/FAMILY/CAREGIVERS; INDEPENDENTLY INTERPRETING RESULTS (TESTS, LABS, PROCEDURES, IMAGING) AND COMMUNICATING AND EXPLAINING RESULTS TO THE  PATIENT/FAMILY/CAREGIVERS  3. COORDINATION OF CARE; PREPARING AND PRINTING DISCHARGE INSTRUCTIONS AND ANY EDUCATIONAL MATERIAL FOR THE PATIENT/FAMILY/CAREGIVERS. DOCUMENTING CLINICAL INFORMATION IN THE ELECTRONIC MEDICAL RECORD   4. REVIEWING OARRS AS NEEDED    MDM  1) COMPLEXITY: MORE THAN 1 STABLE CHRONIC CONDITION ADDRESSED OR 1 ACUTE ILLNESS ADDRESSED   2)DATA: TESTS INTERPRETED AND OR ORDERED, TOOK INDEPENDENT HISTORY OR RECORDS REVIEWED  3)RISK: MODERATE RISK DUE TO NATURE OF MEDICAL CONDITIONS/COMORBIDITY OR MEDICATIONS ORDERED OR SURGICAL OR PROCEDURE REFERRAL    6 WEEKS MED CHECK WITH LABS

## 2024-10-18 DIAGNOSIS — E11.69 TYPE 2 DIABETES MELLITUS WITH OTHER SPECIFIED COMPLICATION, UNSPECIFIED WHETHER LONG TERM INSULIN USE (MULTI): ICD-10-CM

## 2024-10-18 RX ORDER — METFORMIN HYDROCHLORIDE 1000 MG/1
1000 TABLET ORAL EVERY 12 HOURS
Qty: 180 TABLET | Refills: 0 | Status: SHIPPED | OUTPATIENT
Start: 2024-10-18

## 2024-10-24 ENCOUNTER — APPOINTMENT (OUTPATIENT)
Dept: RADIOLOGY | Facility: HOSPITAL | Age: 64
End: 2024-10-24
Payer: MEDICAID

## 2024-10-24 ENCOUNTER — HOSPITAL ENCOUNTER (INPATIENT)
Facility: HOSPITAL | Age: 64
End: 2024-10-24
Attending: EMERGENCY MEDICINE | Admitting: STUDENT IN AN ORGANIZED HEALTH CARE EDUCATION/TRAINING PROGRAM
Payer: MEDICAID

## 2024-10-24 ENCOUNTER — APPOINTMENT (OUTPATIENT)
Dept: CARDIOLOGY | Facility: HOSPITAL | Age: 64
End: 2024-10-24
Payer: MEDICAID

## 2024-10-24 DIAGNOSIS — R55 SYNCOPE AND COLLAPSE: Primary | ICD-10-CM

## 2024-10-24 DIAGNOSIS — R79.89 ELEVATED TROPONIN: ICD-10-CM

## 2024-10-24 DIAGNOSIS — R55 PRE-SYNCOPE: ICD-10-CM

## 2024-10-24 PROBLEM — R13.10 DYSPHAGIA: Status: RESOLVED | Noted: 2023-01-30 | Resolved: 2024-10-24

## 2024-10-24 PROBLEM — Z12.5 SCREENING PSA (PROSTATE SPECIFIC ANTIGEN): Status: RESOLVED | Noted: 2023-08-03 | Resolved: 2024-10-24

## 2024-10-24 LAB
ALBUMIN SERPL BCP-MCNC: 4 G/DL (ref 3.4–5)
ALP SERPL-CCNC: 104 U/L (ref 33–136)
ALT SERPL W P-5'-P-CCNC: 24 U/L (ref 10–52)
ANION GAP SERPL CALC-SCNC: 9 MMOL/L (ref 10–20)
APPEARANCE UR: CLEAR
AST SERPL W P-5'-P-CCNC: 16 U/L (ref 9–39)
BASOPHILS # BLD AUTO: 0.03 X10*3/UL (ref 0–0.1)
BASOPHILS NFR BLD AUTO: 0.2 %
BILIRUB SERPL-MCNC: 0.9 MG/DL (ref 0–1.2)
BILIRUB UR STRIP.AUTO-MCNC: NEGATIVE MG/DL
BUN SERPL-MCNC: 16 MG/DL (ref 6–23)
CALCIUM SERPL-MCNC: 9 MG/DL (ref 8.6–10.3)
CARDIAC TROPONIN I PNL SERPL HS: 57 NG/L (ref 0–20)
CARDIAC TROPONIN I PNL SERPL HS: 66 NG/L (ref 0–20)
CHLORIDE SERPL-SCNC: 104 MMOL/L (ref 98–107)
CHOLEST SERPL-MCNC: 102 MG/DL (ref 0–199)
CHOLESTEROL/HDL RATIO: 2.4
CO2 SERPL-SCNC: 29 MMOL/L (ref 21–32)
COLOR UR: YELLOW
CREAT SERPL-MCNC: 0.75 MG/DL (ref 0.5–1.3)
D DIMER PPP FEU-MCNC: 494 NG/ML FEU
EGFRCR SERPLBLD CKD-EPI 2021: >90 ML/MIN/1.73M*2
EOSINOPHIL # BLD AUTO: 0.01 X10*3/UL (ref 0–0.7)
EOSINOPHIL NFR BLD AUTO: 0.1 %
ERYTHROCYTE [DISTWIDTH] IN BLOOD BY AUTOMATED COUNT: 12.6 % (ref 11.5–14.5)
GLUCOSE BLD MANUAL STRIP-MCNC: 122 MG/DL (ref 74–99)
GLUCOSE SERPL-MCNC: 126 MG/DL (ref 74–99)
GLUCOSE UR STRIP.AUTO-MCNC: ABNORMAL MG/DL
HCT VFR BLD AUTO: 39.7 % (ref 41–52)
HDLC SERPL-MCNC: 42 MG/DL
HGB BLD-MCNC: 13.4 G/DL (ref 13.5–17.5)
IMM GRANULOCYTES # BLD AUTO: 0.04 X10*3/UL (ref 0–0.7)
IMM GRANULOCYTES NFR BLD AUTO: 0.3 % (ref 0–0.9)
KETONES UR STRIP.AUTO-MCNC: NEGATIVE MG/DL
LDLC SERPL CALC-MCNC: 38 MG/DL
LEUKOCYTE ESTERASE UR QL STRIP.AUTO: NEGATIVE
LYMPHOCYTES # BLD AUTO: 1.81 X10*3/UL (ref 1.2–4.8)
LYMPHOCYTES NFR BLD AUTO: 12.6 %
MAGNESIUM SERPL-MCNC: 1.72 MG/DL (ref 1.6–2.4)
MCH RBC QN AUTO: 30.9 PG (ref 26–34)
MCHC RBC AUTO-ENTMCNC: 33.8 G/DL (ref 32–36)
MCV RBC AUTO: 92 FL (ref 80–100)
MONOCYTES # BLD AUTO: 0.87 X10*3/UL (ref 0.1–1)
MONOCYTES NFR BLD AUTO: 6.1 %
MUCOUS THREADS #/AREA URNS AUTO: NORMAL /LPF
NEUTROPHILS # BLD AUTO: 11.61 X10*3/UL (ref 1.2–7.7)
NEUTROPHILS NFR BLD AUTO: 80.7 %
NITRITE UR QL STRIP.AUTO: NEGATIVE
NON HDL CHOLESTEROL: 60 MG/DL (ref 0–149)
NRBC BLD-RTO: 0 /100 WBCS (ref 0–0)
PH UR STRIP.AUTO: 6 [PH]
PLATELET # BLD AUTO: 271 X10*3/UL (ref 150–450)
POTASSIUM SERPL-SCNC: 4 MMOL/L (ref 3.5–5.3)
PROT SERPL-MCNC: 6.1 G/DL (ref 6.4–8.2)
PROT UR STRIP.AUTO-MCNC: ABNORMAL MG/DL
RBC # BLD AUTO: 4.34 X10*6/UL (ref 4.5–5.9)
RBC # UR STRIP.AUTO: NEGATIVE /UL
RBC #/AREA URNS AUTO: NORMAL /HPF
SODIUM SERPL-SCNC: 138 MMOL/L (ref 136–145)
SP GR UR STRIP.AUTO: 1.03
TRIGL SERPL-MCNC: 110 MG/DL (ref 0–149)
UROBILINOGEN UR STRIP.AUTO-MCNC: ABNORMAL MG/DL
VLDL: 22 MG/DL (ref 0–40)
WBC # BLD AUTO: 14.4 X10*3/UL (ref 4.4–11.3)
WBC #/AREA URNS AUTO: NORMAL /HPF

## 2024-10-24 PROCEDURE — 2500000004 HC RX 250 GENERAL PHARMACY W/ HCPCS (ALT 636 FOR OP/ED): Performed by: STUDENT IN AN ORGANIZED HEALTH CARE EDUCATION/TRAINING PROGRAM

## 2024-10-24 PROCEDURE — 71045 X-RAY EXAM CHEST 1 VIEW: CPT

## 2024-10-24 PROCEDURE — 2500000001 HC RX 250 WO HCPCS SELF ADMINISTERED DRUGS (ALT 637 FOR MEDICARE OP): Performed by: EMERGENCY MEDICINE

## 2024-10-24 PROCEDURE — 72125 CT NECK SPINE W/O DYE: CPT

## 2024-10-24 PROCEDURE — 99285 EMERGENCY DEPT VISIT HI MDM: CPT

## 2024-10-24 PROCEDURE — 84443 ASSAY THYROID STIM HORMONE: CPT | Performed by: STUDENT IN AN ORGANIZED HEALTH CARE EDUCATION/TRAINING PROGRAM

## 2024-10-24 PROCEDURE — 36415 COLL VENOUS BLD VENIPUNCTURE: CPT | Performed by: EMERGENCY MEDICINE

## 2024-10-24 PROCEDURE — 71045 X-RAY EXAM CHEST 1 VIEW: CPT | Mod: FOREIGN READ | Performed by: RADIOLOGY

## 2024-10-24 PROCEDURE — 80061 LIPID PANEL: CPT | Performed by: STUDENT IN AN ORGANIZED HEALTH CARE EDUCATION/TRAINING PROGRAM

## 2024-10-24 PROCEDURE — 72125 CT NECK SPINE W/O DYE: CPT | Performed by: RADIOLOGY

## 2024-10-24 PROCEDURE — 83036 HEMOGLOBIN GLYCOSYLATED A1C: CPT | Mod: SAMLAB | Performed by: STUDENT IN AN ORGANIZED HEALTH CARE EDUCATION/TRAINING PROGRAM

## 2024-10-24 PROCEDURE — 82947 ASSAY GLUCOSE BLOOD QUANT: CPT | Mod: 59

## 2024-10-24 PROCEDURE — 84484 ASSAY OF TROPONIN QUANT: CPT | Performed by: EMERGENCY MEDICINE

## 2024-10-24 PROCEDURE — 70450 CT HEAD/BRAIN W/O DYE: CPT | Performed by: RADIOLOGY

## 2024-10-24 PROCEDURE — 83735 ASSAY OF MAGNESIUM: CPT | Performed by: EMERGENCY MEDICINE

## 2024-10-24 PROCEDURE — 93005 ELECTROCARDIOGRAM TRACING: CPT

## 2024-10-24 PROCEDURE — 85025 COMPLETE CBC W/AUTO DIFF WBC: CPT | Performed by: EMERGENCY MEDICINE

## 2024-10-24 PROCEDURE — 2500000001 HC RX 250 WO HCPCS SELF ADMINISTERED DRUGS (ALT 637 FOR MEDICARE OP): Performed by: STUDENT IN AN ORGANIZED HEALTH CARE EDUCATION/TRAINING PROGRAM

## 2024-10-24 PROCEDURE — 82947 ASSAY GLUCOSE BLOOD QUANT: CPT

## 2024-10-24 PROCEDURE — 85379 FIBRIN DEGRADATION QUANT: CPT | Performed by: EMERGENCY MEDICINE

## 2024-10-24 PROCEDURE — 70450 CT HEAD/BRAIN W/O DYE: CPT

## 2024-10-24 PROCEDURE — 1200000002 HC GENERAL ROOM WITH TELEMETRY DAILY

## 2024-10-24 PROCEDURE — 81001 URINALYSIS AUTO W/SCOPE: CPT | Performed by: EMERGENCY MEDICINE

## 2024-10-24 PROCEDURE — 84075 ASSAY ALKALINE PHOSPHATASE: CPT | Performed by: EMERGENCY MEDICINE

## 2024-10-24 PROCEDURE — 99222 1ST HOSP IP/OBS MODERATE 55: CPT | Performed by: STUDENT IN AN ORGANIZED HEALTH CARE EDUCATION/TRAINING PROGRAM

## 2024-10-24 RX ORDER — ENOXAPARIN SODIUM 100 MG/ML
40 INJECTION SUBCUTANEOUS EVERY 24 HOURS
Status: DISCONTINUED | OUTPATIENT
Start: 2024-10-24 | End: 2024-10-28 | Stop reason: HOSPADM

## 2024-10-24 RX ORDER — LEVOTHYROXINE SODIUM 25 UG/1
25 TABLET ORAL DAILY
Status: DISCONTINUED | OUTPATIENT
Start: 2024-10-25 | End: 2024-10-28 | Stop reason: HOSPADM

## 2024-10-24 RX ORDER — CLOPIDOGREL BISULFATE 75 MG/1
75 TABLET ORAL DAILY
Status: DISCONTINUED | OUTPATIENT
Start: 2024-10-25 | End: 2024-10-28 | Stop reason: HOSPADM

## 2024-10-24 RX ORDER — NAPROXEN SODIUM 220 MG/1
324 TABLET, FILM COATED ORAL ONCE
Status: COMPLETED | OUTPATIENT
Start: 2024-10-24 | End: 2024-10-24

## 2024-10-24 RX ORDER — POLYETHYLENE GLYCOL 3350 17 G/17G
17 POWDER, FOR SOLUTION ORAL DAILY
Status: DISCONTINUED | OUTPATIENT
Start: 2024-10-25 | End: 2024-10-28 | Stop reason: HOSPADM

## 2024-10-24 RX ORDER — IPRATROPIUM BROMIDE AND ALBUTEROL SULFATE 2.5; .5 MG/3ML; MG/3ML
3 SOLUTION RESPIRATORY (INHALATION) EVERY 4 HOURS PRN
Status: DISCONTINUED | OUTPATIENT
Start: 2024-10-24 | End: 2024-10-24

## 2024-10-24 RX ORDER — IPRATROPIUM BROMIDE AND ALBUTEROL SULFATE 2.5; .5 MG/3ML; MG/3ML
3 SOLUTION RESPIRATORY (INHALATION) 4 TIMES DAILY PRN
Status: DISCONTINUED | OUTPATIENT
Start: 2024-10-24 | End: 2024-10-27

## 2024-10-24 RX ORDER — ATORVASTATIN CALCIUM 40 MG/1
40 TABLET, FILM COATED ORAL NIGHTLY
Status: DISCONTINUED | OUTPATIENT
Start: 2024-10-24 | End: 2024-10-28 | Stop reason: HOSPADM

## 2024-10-24 RX ADMIN — ASPIRIN 81 MG CHEWABLE TABLET 324 MG: 81 TABLET CHEWABLE at 21:35

## 2024-10-24 RX ADMIN — ENOXAPARIN SODIUM 40 MG: 40 INJECTION SUBCUTANEOUS at 23:50

## 2024-10-24 RX ADMIN — ATORVASTATIN CALCIUM 40 MG: 40 TABLET, FILM COATED ORAL at 23:50

## 2024-10-24 SDOH — ECONOMIC STABILITY: INCOME INSECURITY
IN THE PAST 12 MONTHS HAS THE ELECTRIC, GAS, OIL, OR WATER COMPANY THREATENED TO SHUT OFF SERVICES IN YOUR HOME?: PATIENT DECLINED

## 2024-10-24 SDOH — SOCIAL STABILITY: SOCIAL INSECURITY: ARE YOU OR HAVE YOU BEEN THREATENED OR ABUSED PHYSICALLY, EMOTIONALLY, OR SEXUALLY BY ANYONE?: NO

## 2024-10-24 SDOH — SOCIAL STABILITY: SOCIAL INSECURITY: WITHIN THE LAST YEAR, HAVE YOU BEEN HUMILIATED OR EMOTIONALLY ABUSED IN OTHER WAYS BY YOUR PARTNER OR EX-PARTNER?: NO

## 2024-10-24 SDOH — SOCIAL STABILITY: SOCIAL INSECURITY
WITHIN THE LAST YEAR, HAVE YOU BEEN KICKED, HIT, SLAPPED, OR OTHERWISE PHYSICALLY HURT BY YOUR PARTNER OR EX-PARTNER?: NO

## 2024-10-24 SDOH — ECONOMIC STABILITY: TRANSPORTATION INSECURITY
IN THE PAST 12 MONTHS, HAS LACK OF TRANSPORTATION KEPT YOU FROM MEDICAL APPOINTMENTS OR FROM GETTING MEDICATIONS?: PATIENT DECLINED

## 2024-10-24 SDOH — SOCIAL STABILITY: SOCIAL INSECURITY: WITHIN THE LAST YEAR, HAVE YOU BEEN AFRAID OF YOUR PARTNER OR EX-PARTNER?: NO

## 2024-10-24 SDOH — ECONOMIC STABILITY: FOOD INSECURITY: WITHIN THE PAST 12 MONTHS, THE FOOD YOU BOUGHT JUST DIDN'T LAST AND YOU DIDN'T HAVE MONEY TO GET MORE.: PATIENT DECLINED

## 2024-10-24 SDOH — SOCIAL STABILITY: SOCIAL INSECURITY: DO YOU FEEL ANYONE HAS EXPLOITED OR TAKEN ADVANTAGE OF YOU FINANCIALLY OR OF YOUR PERSONAL PROPERTY?: NO

## 2024-10-24 SDOH — HEALTH STABILITY: PHYSICAL HEALTH: ON AVERAGE, HOW MANY DAYS PER WEEK DO YOU ENGAGE IN MODERATE TO STRENUOUS EXERCISE (LIKE A BRISK WALK)?: 0 DAYS

## 2024-10-24 SDOH — SOCIAL STABILITY: SOCIAL INSECURITY: HAVE YOU HAD ANY THOUGHTS OF HARMING ANYONE ELSE?: NO

## 2024-10-24 SDOH — ECONOMIC STABILITY: HOUSING INSECURITY: IN THE LAST 12 MONTHS, WAS THERE A TIME WHEN YOU WERE NOT ABLE TO PAY THE MORTGAGE OR RENT ON TIME?: PATIENT DECLINED

## 2024-10-24 SDOH — SOCIAL STABILITY: SOCIAL INSECURITY
WITHIN THE LAST YEAR, HAVE YOU BEEN RAPED OR FORCED TO HAVE ANY KIND OF SEXUAL ACTIVITY BY YOUR PARTNER OR EX-PARTNER?: NO

## 2024-10-24 SDOH — HEALTH STABILITY: PHYSICAL HEALTH: ON AVERAGE, HOW MANY MINUTES DO YOU ENGAGE IN EXERCISE AT THIS LEVEL?: 0 MIN

## 2024-10-24 SDOH — SOCIAL STABILITY: SOCIAL INSECURITY: HAS ANYONE EVER THREATENED TO HURT YOUR FAMILY OR YOUR PETS?: NO

## 2024-10-24 SDOH — ECONOMIC STABILITY: HOUSING INSECURITY: AT ANY TIME IN THE PAST 12 MONTHS, WERE YOU HOMELESS OR LIVING IN A SHELTER (INCLUDING NOW)?: PATIENT DECLINED

## 2024-10-24 SDOH — SOCIAL STABILITY: SOCIAL INSECURITY: HAVE YOU HAD THOUGHTS OF HARMING ANYONE ELSE?: NO

## 2024-10-24 SDOH — ECONOMIC STABILITY: FOOD INSECURITY: HOW HARD IS IT FOR YOU TO PAY FOR THE VERY BASICS LIKE FOOD, HOUSING, MEDICAL CARE, AND HEATING?: PATIENT DECLINED

## 2024-10-24 SDOH — ECONOMIC STABILITY: FOOD INSECURITY
WITHIN THE PAST 12 MONTHS, YOU WORRIED THAT YOUR FOOD WOULD RUN OUT BEFORE YOU GOT THE MONEY TO BUY MORE.: PATIENT DECLINED

## 2024-10-24 SDOH — SOCIAL STABILITY: SOCIAL INSECURITY: ARE THERE ANY APPARENT SIGNS OF INJURIES/BEHAVIORS THAT COULD BE RELATED TO ABUSE/NEGLECT?: NO

## 2024-10-24 SDOH — SOCIAL STABILITY: SOCIAL INSECURITY: DOES ANYONE TRY TO KEEP YOU FROM HAVING/CONTACTING OTHER FRIENDS OR DOING THINGS OUTSIDE YOUR HOME?: NO

## 2024-10-24 SDOH — SOCIAL STABILITY: SOCIAL INSECURITY: DO YOU FEEL UNSAFE GOING BACK TO THE PLACE WHERE YOU ARE LIVING?: NO

## 2024-10-24 SDOH — ECONOMIC STABILITY: HOUSING INSECURITY: IN THE PAST 12 MONTHS, HOW MANY TIMES HAVE YOU MOVED WHERE YOU WERE LIVING?: 0

## 2024-10-24 SDOH — SOCIAL STABILITY: SOCIAL INSECURITY: ABUSE: ADULT

## 2024-10-24 ASSESSMENT — COGNITIVE AND FUNCTIONAL STATUS - GENERAL
DAILY ACTIVITIY SCORE: 24
MOBILITY SCORE: 24
PATIENT BASELINE BEDBOUND: NO

## 2024-10-24 ASSESSMENT — COLUMBIA-SUICIDE SEVERITY RATING SCALE - C-SSRS
1. IN THE PAST MONTH, HAVE YOU WISHED YOU WERE DEAD OR WISHED YOU COULD GO TO SLEEP AND NOT WAKE UP?: NO
1. IN THE PAST MONTH, HAVE YOU WISHED YOU WERE DEAD OR WISHED YOU COULD GO TO SLEEP AND NOT WAKE UP?: NO
2. HAVE YOU ACTUALLY HAD ANY THOUGHTS OF KILLING YOURSELF?: NO
6. HAVE YOU EVER DONE ANYTHING, STARTED TO DO ANYTHING, OR PREPARED TO DO ANYTHING TO END YOUR LIFE?: NO
2. HAVE YOU ACTUALLY HAD ANY THOUGHTS OF KILLING YOURSELF?: NO
6. HAVE YOU EVER DONE ANYTHING, STARTED TO DO ANYTHING, OR PREPARED TO DO ANYTHING TO END YOUR LIFE?: NO

## 2024-10-24 ASSESSMENT — ACTIVITIES OF DAILY LIVING (ADL)
HEARING - LEFT EAR: FUNCTIONAL
TOILETING: INDEPENDENT
JUDGMENT_ADEQUATE_SAFELY_COMPLETE_DAILY_ACTIVITIES: YES
GROOMING: INDEPENDENT
DRESSING YOURSELF: INDEPENDENT
PATIENT'S MEMORY ADEQUATE TO SAFELY COMPLETE DAILY ACTIVITIES?: YES
ADEQUATE_TO_COMPLETE_ADL: YES
LACK_OF_TRANSPORTATION: PATIENT DECLINED
FEEDING YOURSELF: INDEPENDENT
WALKS IN HOME: INDEPENDENT
BATHING: INDEPENDENT
LACK_OF_TRANSPORTATION: PATIENT DECLINED
HEARING - RIGHT EAR: FUNCTIONAL

## 2024-10-24 ASSESSMENT — PATIENT HEALTH QUESTIONNAIRE - PHQ9
1. LITTLE INTEREST OR PLEASURE IN DOING THINGS: NOT AT ALL
2. FEELING DOWN, DEPRESSED OR HOPELESS: NOT AT ALL
SUM OF ALL RESPONSES TO PHQ9 QUESTIONS 1 & 2: 0

## 2024-10-24 ASSESSMENT — ENCOUNTER SYMPTOMS
POLYDIPSIA: 0
ARTHRALGIAS: 0
VOMITING: 0
SHORTNESS OF BREATH: 0
DYSURIA: 0
LIGHT-HEADEDNESS: 0
ABDOMINAL PAIN: 0
PHOTOPHOBIA: 0
RHINORRHEA: 0
FREQUENCY: 0
NERVOUS/ANXIOUS: 0
NAUSEA: 0
DIFFICULTY URINATING: 0
MYALGIAS: 0
WEAKNESS: 0
WOUND: 0
COUGH: 0
PALPITATIONS: 0
WHEEZING: 0
CHILLS: 0
DYSPHORIC MOOD: 0
DIAPHORESIS: 0

## 2024-10-24 ASSESSMENT — LIFESTYLE VARIABLES
SKIP TO QUESTIONS 9-10: 1
AUDIT-C TOTAL SCORE: 0
HOW OFTEN DO YOU HAVE 6 OR MORE DRINKS ON ONE OCCASION: NEVER
HOW OFTEN DO YOU HAVE A DRINK CONTAINING ALCOHOL: NEVER
SUBSTANCE_ABUSE_PAST_12_MONTHS: NO
AUDIT-C TOTAL SCORE: 0
HOW MANY STANDARD DRINKS CONTAINING ALCOHOL DO YOU HAVE ON A TYPICAL DAY: PATIENT DOES NOT DRINK
PRESCIPTION_ABUSE_PAST_12_MONTHS: NO

## 2024-10-24 ASSESSMENT — PAIN SCALES - GENERAL
PAINLEVEL_OUTOF10: 0 - NO PAIN

## 2024-10-24 ASSESSMENT — PAIN - FUNCTIONAL ASSESSMENT
PAIN_FUNCTIONAL_ASSESSMENT: 0-10
PAIN_FUNCTIONAL_ASSESSMENT: 0-10

## 2024-10-24 NOTE — ED PROVIDER NOTES
"HPI   Chief Complaint   Patient presents with    Dizziness     Pt states he was standing making dinner and started \"feeling like I was going to pass out\". Denies chest pain or SOB. Denies sx at this time       Limitations to History: None    HPI: 63-year-old male presents with presyncope.  Patient states he was standing in the kitchen cooking when he felt very flushed.  Following is on a pass out and fell to the ground but never whiles consciousness completely.  States he felt like all of his fingertips were very white.  Did not strike his head.  Denies any neck pain.  Denies any headache, vision change, chest pain, shortness of breath, nausea, vomiting, abdominal pain, urinary symptoms.  Patient is on Plavix secondary to coronary artery disease with history of stenting x 1 approximately 6 years ago.    Additional History Obtained from: Stepdaughter at the bedside.    ------------------------------------------------------------------------------------------------------------------------------------------  Physical Exam:    VS: As documented in the triage note and EMR flowsheet from this visit were reviewed.    Appearance: Alert. cooperative,  in no acute distress.   Skin: Intact,  dry skin, no lesions, rash, petechiae or purpura.   Eyes: PERRLA, EOMs intact,  Conjunctiva pink with no redness or exudates.   HENT: Normocephalic, atraumatic. Nares patent. No intraoral lesions.   Neck: Supple, without meningismus. Trachea at midline. No lymphadenopathy.  Pulmonary: Clear bilaterally with good chest wall excursion. No rales, rhonchi or wheezing. No accessory muscle use or stridor.  Cardiac: Regular rate and rhythm, no rubs, murmurs, or gallops.   Abdomen: Abdomen is soft, nontender, and nondistended.  No palpable organomegaly.  No rebound or guarding.  No CVA tenderness. Nonsurgical abdomen.  Genitourinary: Exam deferred.  Musculoskeletal: Full range of motion.  Pulses full and equal. No cyanosis, clubbing, or " edema.  Neurological:  Cranial nerves are grossly intact, grossly normal sensation, no weakness, no focal findings identified.  Psychiatric: Appropriate mood and affect.                Patient History   Past Medical History:   Diagnosis Date    Diabetes mellitus (Multi)      Past Surgical History:   Procedure Laterality Date    CARDIAC SURGERY      COLONOSCOPY  03/12/2020    REPEAT 3  YEARS. FINAL DIAGNOSIS A.  DECSENDING COLON POLYP, POLYPECTOMY: --TUBULAR ADENOMA.  B.  RECTAL BIOPSY: --COLONIC MUCOSA WITH PROMINENT LYMPHOID AGGREGATES.  REPEAT 3 YEARS    FINGER SURGERY      RIGHT THUMB SURGERY    OTHER SURGICAL HISTORY  07/22/2020    Bone transplantation    OTHER SURGICAL HISTORY  02/10/2020    Surgery    OTHER SURGICAL HISTORY  07/22/2020    Cardiac catheterization    SKIN GRAFT       Family History   Problem Relation Name Age of Onset    Brain cancer Mother      Diabetes type II Father      Alzheimer's disease Father      Alzheimer's disease Sister      Dementia Sister      Colon cancer Brother       Social History     Tobacco Use    Smoking status: Former     Current packs/day: 1.00     Average packs/day: 1 pack/day for 40.0 years (40.0 ttl pk-yrs)     Types: Cigarettes    Smokeless tobacco: Never   Vaping Use    Vaping status: Never Used   Substance Use Topics    Alcohol use: Never    Drug use: Never       Physical Exam   ED Triage Vitals [10/24/24 1903]   Temperature Heart Rate Respirations BP   36.8 °C (98.2 °F) 69 18 134/65      Pulse Ox Temp Source Heart Rate Source Patient Position   95 % Temporal -- --      BP Location FiO2 (%)     -- --       Physical Exam      ED Course & MDM   Diagnoses as of 10/24/24 2139   Pre-syncope   Elevated troponin                 No data recorded                                 Medical Decision Making  Labs Reviewed  CBC WITH AUTO DIFFERENTIAL - Abnormal     WBC                           14.4 (*)               nRBC                          0.0                    RBC                            4.34 (*)               Hemoglobin                    13.4 (*)               Hematocrit                    39.7 (*)               MCV                           92                     MCH                           30.9                   MCHC                          33.8                   RDW                           12.6                   Platelets                     271                    Neutrophils %                 80.7                   Immature Granulocytes %, Automated   0.3                    Lymphocytes %                 12.6                   Monocytes %                   6.1                    Eosinophils %                 0.1                    Basophils %                   0.2                    Neutrophils Absolute          11.61 (*)               Immature Granulocytes Absolute, Au*   0.04                   Lymphocytes Absolute          1.81                   Monocytes Absolute            0.87                   Eosinophils Absolute          0.01                   Basophils Absolute            0.03                COMPREHENSIVE METABOLIC PANEL - Abnormal     Glucose                       126 (*)                Sodium                        138                    Potassium                     4.0                    Chloride                      104                    Bicarbonate                   29                     Anion Gap                     9 (*)                  Urea Nitrogen                 16                     Creatinine                    0.75                   eGFR                          >90                    Calcium                       9.0                    Albumin                       4.0                    Alkaline Phosphatase          104                    Total Protein                 6.1 (*)                AST                           16                     Bilirubin, Total              0.9                    ALT                           24                   URINALYSIS WITH REFLEX CULTURE AND MICROSCOPIC - Abnormal     Color, Urine                  Yellow                 Appearance, Urine             Clear                  Specific Gravity, Urine       1.029                  pH, Urine                     6.0                    Protein, Urine                20 (TRACE)                Glucose, Urine                50 (TRACE) (*)               Blood, Urine                  NEGATIVE                Ketones, Urine                NEGATIVE                Bilirubin, Urine              NEGATIVE                Urobilinogen, Urine           2 (1+) (*)               Nitrite, Urine                NEGATIVE                Leukocyte Esterase, Urine     NEGATIVE             SERIAL TROPONIN-INITIAL - Abnormal     Troponin I, High Sensitivity   66 (*)                     Narrative: Less than 99th percentile of normal range cutoff-                  Female and children under 18 years old <14 ng/L; Male <21 ng/L: Negative                  Repeat testing should be performed if clinically indicated.                                     Female and children under 18 years old 14-50 ng/L; Male 21-50 ng/L:                  Consistent with possible cardiac damage and possible increased clinical                   risk. Serial measurements may help to assess extent of myocardial damage.                                     >50 ng/L: Consistent with cardiac damage, increased clinical risk and                  myocardial infarction. Serial measurements may help assess extent of                   myocardial damage.                                      NOTE: Children less than 1 year old may have higher baseline troponin                   levels and results should be interpreted in conjunction with the overall                   clinical context.                                     NOTE: Troponin I testing is performed using a different                   testing methodology at [facility] than  at other                   system hospitals. Direct result comparisons should only                   be made within the same method.  SERIAL TROPONIN, 1 HOUR - Abnormal     Troponin I, High Sensitivity   57 (*)                     Narrative: Less than 99th percentile of normal range cutoff-                  Female and children under 18 years old <14 ng/L; Male <21 ng/L: Negative                  Repeat testing should be performed if clinically indicated.                                     Female and children under 18 years old 14-50 ng/L; Male 21-50 ng/L:                  Consistent with possible cardiac damage and possible increased clinical                   risk. Serial measurements may help to assess extent of myocardial damage.                                     >50 ng/L: Consistent with cardiac damage, increased clinical risk and                  myocardial infarction. Serial measurements may help assess extent of                   myocardial damage.                                      NOTE: Children less than 1 year old may have higher baseline troponin                   levels and results should be interpreted in conjunction with the overall                   clinical context.                                     NOTE: Troponin I testing is performed using a different                   testing methodology at [facility] than at [facility]. Direct result comparisons should only                   be made within the same method.  POCT GLUCOSE - Abnormal     POCT Glucose                  122 (*)             MAGNESIUM - Normal     Magnesium                     1.72                D-DIMER, VTE EXCLUSION - Normal     D-Dimer, Quantitative VTE Exclusion   494                        Narrative: The VTE Exclusion D-Dimer assay is reported in ng/mL Fibrinogen Equivalent Units (FEU).                                    Per 's instructions for use, a value of less  than 500 ng/mL (FEU) may help to exclude DVT or PE in outpatients when the assay is used with a clinical pretest probability assessment.([facility] must utilize and document eCalc 'Wells Score Deep Vein Thrombosis Risk' for DVT exclusion only. Emergency Department should utilize  Guidelines for Emergency Department Use of the VTE Exclusion D-Dimer and Clinical Pretest probability assessment model for DVT or PE exclusion.)  TROPONIN SERIES- (INITIAL, 1 HR)         Narrative: The following orders were created for panel order Troponin I Series, High Sensitivity (0, 1 HR).                  Procedure                               Abnormality         Status                                     ---------                               -----------         ------                                     Troponin I, High Sensiti...[[id number]]  Abnormal            Final result                               Troponin, High Sensitivi...[[id number]]  Abnormal            Final result                                                 Please view results for these tests on the individual orders.  URINALYSIS WITH REFLEX CULTURE AND MICROSCOPIC         Narrative: The following orders were created for panel order Urinalysis with Reflex Culture and Microscopic.                  Procedure                               Abnormality         Status                                     ---------                               -----------         ------                                     Urinalysis with Reflex C...[[id number]]  Abnormal            Final result                               Extra Urine Gray Tube[[id number]]                                                                                         Please view results for these tests on the individual orders.  EXTRA URINE GRAY TUBE  URINALYSIS MICROSCOPIC WITH REFLEX CULTURE  CT head wo IV contrast   Final Result    CT HEAD:    1. No acute intracranial abnormality or calvarial fracture.                 CT CERVICAL SPINE:    1. No acute fracture or traumatic malalignment of the cervical spine.          MACRO:    None          Signed by: Jesus Hernandez 10/24/2024 8:59 PM    Dictation workstation:   [id number]     CT cervical spine wo IV contrast   Final Result    CT HEAD:    1. No acute intracranial abnormality or calvarial fracture.                CT CERVICAL SPINE:    1. No acute fracture or traumatic malalignment of the cervical spine.          MACRO:    None          Signed by: Jesus Hernandez 10/24/2024 8:59 PM    Dictation workstation:   [id number]     XR chest 1 view   Final Result    Normal heart size with no signs of active pulmonary parenchymal    infiltration. Hyperaeration more pronounced than on the prior mainly    on the right.    Signed by Juliet Oquendo DO     Medical Decision Making:    Patient appears well nontoxic.  Vital signs within normal limits.  EKG nonischemic.  Initial troponin 66 and repeat troponin of 57.  Chest x-ray clear.  Patient treated with aspirin.  CT brain and cervical spine negative.  Case discussed with hospitalist and patient will be admitted for further treatment and observation.    Differential Diagnoses Considered: Arrhythmia, ACS, intracranial hemorrhage, electrolyte abnormality, volume depletion    Independent Interpretation of Studies:  I independently interpreted: Chest x-ray shows no evidence of pneumonia or pneumothorax.  CT brain without intracranial hemorrhage or mass.  CT cervical spine without acute fracture.    Escalation of Care:  Appropriate for admission for further treatment and evaluation.    Discussion of Management with Other Providers:   I discussed the patient/results with: Hospitalist.          Procedure  Procedures     Murphy Dean DO  10/24/24 8306

## 2024-10-25 ENCOUNTER — APPOINTMENT (OUTPATIENT)
Dept: CARDIOLOGY | Facility: HOSPITAL | Age: 64
End: 2024-10-25
Payer: MEDICAID

## 2024-10-25 ENCOUNTER — APPOINTMENT (OUTPATIENT)
Dept: VASCULAR MEDICINE | Facility: HOSPITAL | Age: 64
End: 2024-10-25
Payer: MEDICAID

## 2024-10-25 LAB
AORTIC VALVE MEAN GRADIENT: 5 MMHG
AORTIC VALVE PEAK VELOCITY: 1.78 M/S
ATRIAL RATE: 58 BPM
ATRIAL RATE: 58 BPM
AV PEAK GRADIENT: 12.7 MMHG
AVA (PEAK VEL): 1.91 CM2
AVA (VTI): 1.89 CM2
EJECTION FRACTION APICAL 4 CHAMBER: 57.5
EJECTION FRACTION: 58 %
EST. AVERAGE GLUCOSE BLD GHB EST-MCNC: 157 MG/DL
GLUCOSE BLD MANUAL STRIP-MCNC: 134 MG/DL (ref 74–99)
HBA1C MFR BLD: 7.1 %
LEFT ATRIUM VOLUME AREA LENGTH INDEX BSA: 22.1 ML/M2
LEFT VENTRICULAR OUTFLOW TRACT DIAMETER: 2 CM
LV EJECTION FRACTION BIPLANE: 53 %
MITRAL VALVE E/A RATIO: 0.86
P AXIS: 63 DEGREES
P AXIS: 71 DEGREES
P OFFSET: 206 MS
P OFFSET: 209 MS
P ONSET: 148 MS
P ONSET: 153 MS
PR INTERVAL: 142 MS
PR INTERVAL: 148 MS
Q ONSET: 222 MS
Q ONSET: 224 MS
QRS COUNT: 10 BEATS
QRS COUNT: 9 BEATS
QRS DURATION: 86 MS
QRS DURATION: 88 MS
QT INTERVAL: 422 MS
QT INTERVAL: 428 MS
QTC CALCULATION(BAZETT): 414 MS
QTC CALCULATION(BAZETT): 420 MS
QTC FREDERICIA: 417 MS
QTC FREDERICIA: 423 MS
R AXIS: -33 DEGREES
R AXIS: -39 DEGREES
RIGHT VENTRICLE PEAK SYSTOLIC PRESSURE: 25.7 MMHG
T AXIS: 15 DEGREES
T AXIS: 50 DEGREES
T OFFSET: 433 MS
T OFFSET: 438 MS
TRICUSPID ANNULAR PLANE SYSTOLIC EXCURSION: 2.5 CM
TSH SERPL-ACNC: 1.69 MIU/L (ref 0.44–3.98)
VENTRICULAR RATE: 58 BPM
VENTRICULAR RATE: 58 BPM

## 2024-10-25 PROCEDURE — 2500000001 HC RX 250 WO HCPCS SELF ADMINISTERED DRUGS (ALT 637 FOR MEDICARE OP): Performed by: STUDENT IN AN ORGANIZED HEALTH CARE EDUCATION/TRAINING PROGRAM

## 2024-10-25 PROCEDURE — 93005 ELECTROCARDIOGRAM TRACING: CPT

## 2024-10-25 PROCEDURE — 93880 EXTRACRANIAL BILAT STUDY: CPT

## 2024-10-25 PROCEDURE — 93306 TTE W/DOPPLER COMPLETE: CPT

## 2024-10-25 PROCEDURE — 1200000002 HC GENERAL ROOM WITH TELEMETRY DAILY

## 2024-10-25 PROCEDURE — 93880 EXTRACRANIAL BILAT STUDY: CPT | Performed by: SURGERY

## 2024-10-25 PROCEDURE — 99232 SBSQ HOSP IP/OBS MODERATE 35: CPT | Performed by: STUDENT IN AN ORGANIZED HEALTH CARE EDUCATION/TRAINING PROGRAM

## 2024-10-25 PROCEDURE — 2500000004 HC RX 250 GENERAL PHARMACY W/ HCPCS (ALT 636 FOR OP/ED): Performed by: STUDENT IN AN ORGANIZED HEALTH CARE EDUCATION/TRAINING PROGRAM

## 2024-10-25 PROCEDURE — 82947 ASSAY GLUCOSE BLOOD QUANT: CPT

## 2024-10-25 PROCEDURE — 99254 IP/OBS CNSLTJ NEW/EST MOD 60: CPT | Performed by: STUDENT IN AN ORGANIZED HEALTH CARE EDUCATION/TRAINING PROGRAM

## 2024-10-25 PROCEDURE — 93306 TTE W/DOPPLER COMPLETE: CPT | Performed by: STUDENT IN AN ORGANIZED HEALTH CARE EDUCATION/TRAINING PROGRAM

## 2024-10-25 PROCEDURE — 93010 ELECTROCARDIOGRAM REPORT: CPT | Performed by: STUDENT IN AN ORGANIZED HEALTH CARE EDUCATION/TRAINING PROGRAM

## 2024-10-25 RX ORDER — FLUOXETINE HYDROCHLORIDE 20 MG/1
20 CAPSULE ORAL DAILY
Status: DISCONTINUED | OUTPATIENT
Start: 2024-10-25 | End: 2024-10-28 | Stop reason: HOSPADM

## 2024-10-25 RX ADMIN — ARIPIPRAZOLE 20 MG: 15 TABLET ORAL at 10:13

## 2024-10-25 RX ADMIN — LEVOTHYROXINE SODIUM 25 MCG: 25 TABLET ORAL at 10:13

## 2024-10-25 RX ADMIN — FLUOXETINE HYDROCHLORIDE 20 MG: 20 CAPSULE ORAL at 20:35

## 2024-10-25 RX ADMIN — PERFLUTREN 2 ML OF DILUTION: 6.52 INJECTION, SUSPENSION INTRAVENOUS at 06:18

## 2024-10-25 RX ADMIN — CLOPIDOGREL 75 MG: 75 TABLET ORAL at 20:35

## 2024-10-25 RX ADMIN — ATORVASTATIN CALCIUM 40 MG: 40 TABLET, FILM COATED ORAL at 20:35

## 2024-10-25 ASSESSMENT — COGNITIVE AND FUNCTIONAL STATUS - GENERAL
DAILY ACTIVITIY SCORE: 24
MOBILITY SCORE: 24

## 2024-10-25 ASSESSMENT — PAIN - FUNCTIONAL ASSESSMENT
PAIN_FUNCTIONAL_ASSESSMENT: 0-10

## 2024-10-25 ASSESSMENT — PAIN SCALES - GENERAL
PAINLEVEL_OUTOF10: 0 - NO PAIN

## 2024-10-25 ASSESSMENT — ACTIVITIES OF DAILY LIVING (ADL): LACK_OF_TRANSPORTATION: NO

## 2024-10-25 NOTE — PROGRESS NOTES
10/25/24 1508   Discharge Planning   Living Arrangements Friends   Support Systems Friends/neighbors   Assistance Needed None   Type of Residence Private residence   Number of Stairs to Enter Residence 3   Do you have animals or pets at home? Yes   Type of Animals or Pets 1 dog   Who is requesting discharge planning? Provider   Home or Post Acute Services None   Expected Discharge Disposition Home   Does the patient need discharge transport arranged? No   Financial Resource Strain   How hard is it for you to pay for the very basics like food, housing, medical care, and heating? Not very   Housing Stability   In the last 12 months, was there a time when you were not able to pay the mortgage or rent on time? N   In the past 12 months, how many times have you moved where you were living? 0   At any time in the past 12 months, were you homeless or living in a shelter (including now)? N   Transportation Needs   In the past 12 months, has lack of transportation kept you from medical appointments or from getting medications? no   In the past 12 months, has lack of transportation kept you from meetings, work, or from getting things needed for daily living? No     Care Transitions: Patient reviewed in care round meeting this AM. ADOD 24 hours. Met with patient at bedside. Role of TCC explained. Demographics and contacts verified. He lives in a 2 story with friend Cassandra, bedroom/bath on first floor. PCP is Sadie Rm. Preferred pharmacy is [facility] in [place], denies difficulty obtaining/affording. States he is independent at home with ADL's and able to drive. Denies the need for DME or diabetic supplies. [facility]. Discharge plan is home, denies needs or in home services at this time. States his friend Cassandra's daughter will transport him home when discharged. Care team to follow. Sera Painter RN/TCC

## 2024-10-25 NOTE — PROGRESS NOTES
Naveed Garcia is a 63 y.o. male on day 1 of admission presenting with Syncope and collapse.      Subjective   Patient seen and examined at bedside.  No complaints today.       Objective     Last Recorded Vitals  /83 (BP Location: Left arm, Patient Position: Lying)   Pulse 60   Temp 36.1 °C (97 °F) (Temporal)   Resp 18   Wt 84.2 kg (185 lb 10 oz)   SpO2 93%   Intake/Output last 3 Shifts:  No intake or output data in the 24 hours ending 10/25/24 1312    Admission Weight  Weight: 85.7 kg (189 lb) (10/24/24 1903)    Daily Weight  10/24/24 : 84.2 kg (185 lb 10 oz)    Image Results  ECG 12 Lead  Sinus bradycardia  Left axis deviation  Nonspecific ST abnormality  Abnormal ECG  When compared with ECG of 24-OCT-2024 14:40, (unconfirmed)  Significant changes have occurred  ECG 12 lead  Sinus bradycardia  Left axis deviation  Nonspecific ST abnormality  Abnormal ECG  When compared with ECG of 24-OCT-2024 18:43, (unconfirmed)  No significant change was found  Confirmed by Jonathan Geiger ([id number]) on 10/25/2024 9:27:40 AM  Transthoracic echo (TTE) [place] [address]  Phone [phone], Fax [phone]    TRANSTHORACIC ECHOCARDIOGRAM REPORT    Patient Name:      NAVEED GARCIA    Reading Physician:    [id number] Jonathan Geiger MD  Study Date:        10/25/2024           Ordering Provider:    [id number] ELSI BENAVIDES  MRN/PID:           [id number]             Fellow:  Accession#:        [id number]         Nurse:                Sandra Drew RN  Date of Birth/Age: 1960 / 63      Sonographer:          Marcela Gordillo RVT,                     years                                      RCS  Gender:            M                    Additional Staff:  Height:            175.26 cm            Admit Date:            10/24/2024  Weight:            40.82 kg             Admission Status:     Inpatient -                                                                Routine  BSA / BMI:         1.47 m2 / 13.29      Department Location:  [facility]                     kg/m2  Blood Pressure: 126 /57 mmHg    Study Type:    TRANSTHORACIC ECHO (TTE) COMPLETE  Diagnosis/ICD: Syncope-R55  Indication:    Syncope  CPT Codes:     Echo Complete w Full [id number]    Patient History:  Pertinent History: No previous echo.    Study Detail: The following Echo studies were performed: 2D, M-Mode, Doppler and                color flow. Definity used as a contrast agent for endocardial                border definition and agitated saline used as a contrast agent for                intraseptal flow evaluation. Total contrast used for this                procedure was 2 mL via IV push. The patient was awake.       PHYSICIAN INTERPRETATION:  Left Ventricle: Left ventricular ejection fraction is normal, by visual estimate at 55-60%. There are no regional wall motion abnormalities. The left ventricular cavity size is normal. Spectral Doppler shows a normal pattern of left ventricular diastolic filling.  Left Atrium: The left atrium is normal in size. A bubble study using agitated saline was performed. Bubble study is negative.  Right Ventricle: The right ventricle is normal in size. There is normal right ventricular global systolic function.  Right Atrium: The right atrium is normal in size.  Aortic Valve: The aortic valve was not well visualized. The aortic valve dimensionless index is 0.60. There is no evidence of aortic valve regurgitation. The peak instantaneous gradient of the aortic valve is 12.7 mmHg. The mean gradient of the aortic valve is 5.0 mmHg.  Mitral Valve: The mitral valve is normal in structure. There is no evidence of mitral valve regurgitation.  Tricuspid Valve: The tricuspid valve is structurally normal. No evidence of tricuspid  regurgitation.  Pulmonic Valve: The pulmonic valve is not well visualized. There is no indication of pulmonic valve regurgitation.  Pericardium: No pericardial effusion noted.  Aorta: The aortic root is normal.  Systemic Veins: The inferior vena cava appears normal in size, with IVC inspiratory collapse greater than 50%.       CONCLUSIONS:   1. Left ventricular ejection fraction is normal, by visual estimate at 55-60%.   2. There is normal right ventricular global systolic function.    QUANTITATIVE DATA SUMMARY:     2D MEASUREMENTS:         Normal Ranges:  Ao Root d:       2.60 cm (2.0-3.7cm)       LA VOLUME:                    Normal Ranges:  LA Vol A4C:        26.5 ml    (22+/-6mL/m2)  LA Vol A2C:        39.7 ml  LA Vol BP:         32.5 ml  LA Vol Index A4C:  18.0ml/m2  LA Vol Index A2C:  27.0 ml/m2  LA Vol Index BP:   22.1 ml/m2  LA Area A4C:       12.7 cm2  LA Area A2C:       15.5 cm2  LA Major Axis A4C: 5.2 cm  LA Major Axis A2C: 5.2 cm  LA Volume Index:   26.4 ml/m2  LA Vol A4C:        26.1 ml  LA Vol A2C:        38.8 ml  LA Vol Index BSA:  22.1 ml/m2       M-MODE MEASUREMENTS:         Normal Ranges:  AoV Exc:             2.00 cm (1.5-2.5cm)       AORTA MEASUREMENTS:         Normal Ranges:  AoV Exc:            2.00 cm (1.5-2.5cm)       LV SYSTOLIC FUNCTION BY 2D PLANIMETRY (MOD):                       Normal Ranges:  EF-A4C View:    58 % (>=55%)  EF-A2C View:    46 %  EF-Biplane:     53 %  EF-Visual:      58 %  LV EF Reported: 58 %       LV DIASTOLIC FUNCTION:           Normal Ranges:  MV Peak E:             0.99 m/s  (0.7-1.2 m/s)  MV Peak A:             1.15 m/s  (0.42-0.7 m/s)  E/A Ratio:             0.86      (1.0-2.2)  MV e'                  0.112 m/s (>8.0)  MV lateral e'          0.12 m/s  MV medial e'           0.11 m/s  E/e' Ratio:            8.84      (<8.0)       MITRAL VALVE:          Normal Ranges:  MV DT:        225 msec (150-240msec)       AORTIC VALVE:                      Normal Ranges:  AoV  Vmax:                1.78 m/s  (<=1.7m/s)  AoV Peak P.7 mmHg (<20mmHg)  AoV Mean P.0 mmHg  (1.7-11.5mmHg)  LVOT Max Faustino:            1.08 m/s  (<=1.1m/s)  AoV VTI:                 39.60 cm  (18-25cm)  LVOT VTI:                23.80 cm  LVOT Diameter:           2.00 cm   (1.8-2.4cm)  AoV Area, VTI:           1.89 cm2  (2.5-5.5cm2)  AoV Area,Vmax:           1.91 cm2  (2.5-4.5cm2)  AoV Dimensionless Index: 0.60       RIGHT VENTRICLE:  RV Basal 3.19 cm  RV Mid   2.05 cm  RV Major 5.0 cm  TAPSE:   24.8 mm       TRICUSPID VALVE/RVSP:          Normal Ranges:  Peak TR Velocity:     2.38 m/s  RV Syst Pressure:     26 mmHg  (< 30mmHg)       PULMONIC VALVE:          Normal Ranges:  PV Accel Time:  134 msec (>120ms)  PV Max Faustino:     1.1 m/s  (0.6-0.9m/s)  PV Max P.1 mmHg       [id number] Jonathan Geiger MD  Electronically signed on 10/25/2024 at 9:21:42 AM       ** Final **      Physical Exam  Constitutional:       Appearance: Normal appearance.   HENT:      Head: Normocephalic.      Right Ear: Tympanic membrane normal.      Nose: Nose normal.      Mouth/Throat:      Mouth: Mucous membranes are moist.   Eyes:      Pupils: Pupils are equal, round, and reactive to light.   Cardiovascular:      Rate and Rhythm: Normal rate.      Pulses: Normal pulses.   Pulmonary:      Effort: Pulmonary effort is normal.   Abdominal:      Palpations: Abdomen is soft.   Genitourinary:     Penis: Normal.    Musculoskeletal:         General: Normal range of motion.      Cervical back: Normal range of motion.   Skin:     General: Skin is dry.      Capillary Refill: Capillary refill takes less than 2 seconds.   Neurological:      General: No focal deficit present.      Mental Status: He is alert.   Psychiatric:         Mood and Affect: Mood normal.         Relevant Results             Scheduled medications  ARIPiprazole, 20 mg, oral, Daily  atorvastatin, 40 mg, oral, Nightly  clopidogrel, 75 mg, oral,  Daily  enoxaparin, 40 mg, subcutaneous, q24h  FLUoxetine, 20 mg, oral, Daily  levothyroxine, 25 mcg, oral, Daily  polyethylene glycol, 17 g, oral, Daily      Continuous medications     PRN medications  PRN medications: ipratropium-albuteroL  MRI  Assessment/Plan        63-year-old male With past medical history of CAD S/P stent (around 6 years ago), hypertension, diabetes, hyperlipidemia, hypothyroidism, COPD, GERD, BPH, bipolar disorder, ex-smoker presents with syncope.    Patient had troponin mildly elevated downtrending.  Patient had an echocardiogram done today shows a EF of 55 to 60%.    Will obtain carotid ultrasound, TSH  Continue Plavix and statin  Cardiology consult appreciated  Continuous cardiac monitoring  Cardiac monitor as outpatient    DVT prophylaxis: Lovenox  GI prophylaxis: Not on  Full code      Edith Webster MD

## 2024-10-25 NOTE — H&P
"History Of Present Illness  Naveed Brown is a 63 y.o. male presenting with   Chief Complaint   Patient presents with    Dizziness     Pt states he was standing making dinner and started \"feeling like I was going to pass out\". Denies chest pain or SOB. Denies sx at this time        HPI obtained from pt. Additional hx obtained from roommate (Cassandra Mckee) at bedside acting as independent historian. States pt not taking metformin twice a day, only taking once a day. Pt is no longer taking imdur.   Notes from ED physician and nurse reviewed. Case discussed with attending ED physician.     Past Medical History  Past Medical History:   Diagnosis Date    Diabetes mellitus (Multi)         Surgical History  Past Surgical History:   Procedure Laterality Date    CARDIAC SURGERY      COLONOSCOPY  03/12/2020    REPEAT 3  YEARS. FINAL DIAGNOSIS A.  DECSENDING COLON POLYP, POLYPECTOMY: --TUBULAR ADENOMA.  B.  RECTAL BIOPSY: --COLONIC MUCOSA WITH PROMINENT LYMPHOID AGGREGATES.  REPEAT 3 YEARS    FINGER SURGERY      RIGHT THUMB SURGERY    OTHER SURGICAL HISTORY  07/22/2020    Bone transplantation    OTHER SURGICAL HISTORY  02/10/2020    Surgery    OTHER SURGICAL HISTORY  07/22/2020    Cardiac catheterization    SKIN GRAFT        Recent Surgeries in Hospitalist            No cases to display           Past Surgical History:   Procedure Laterality Date    CARDIAC SURGERY      COLONOSCOPY  03/12/2020    REPEAT 3  YEARS. FINAL DIAGNOSIS A.  DECSENDING COLON POLYP, POLYPECTOMY: --TUBULAR ADENOMA.  B.  RECTAL BIOPSY: --COLONIC MUCOSA WITH PROMINENT LYMPHOID AGGREGATES.  REPEAT 3 YEARS    FINGER SURGERY      RIGHT THUMB SURGERY    OTHER SURGICAL HISTORY  07/22/2020    Bone transplantation    OTHER SURGICAL HISTORY  02/10/2020    Surgery    OTHER SURGICAL HISTORY  07/22/2020    Cardiac catheterization    SKIN GRAFT           Social History  Social History     Substance and Sexual Activity   Alcohol Use Never      Social History "     Substance and Sexual Activity   Drug Use Never      Social History     Substance and Sexual Activity   Sexual Activity Not on file      Social History     Tobacco Use   Smoking Status Former    Current packs/day: 1.00    Average packs/day: 1 pack/day for 40.0 years (40.0 ttl pk-yrs)    Types: Cigarettes   Smokeless Tobacco Never      Food Insecurity: Not on file      Financial Resource Strain: Not on file      Intimate Partner Violence: Not on file      Transportation Needs: Not on file        Family History  Family History   Problem Relation Name Age of Onset    Brain cancer Mother      Diabetes type II Father      Alzheimer's disease Father      Alzheimer's disease Sister      Dementia Sister      Colon cancer Brother           Allergies  No Known Allergies   No Known Allergies     Review of Systems  Review of Systems   Constitutional:  Negative for chills and diaphoresis.   HENT:  Negative for congestion and rhinorrhea.    Eyes:  Negative for photophobia and visual disturbance.   Respiratory:  Negative for cough, shortness of breath and wheezing.    Cardiovascular:  Negative for chest pain, palpitations and leg swelling.   Gastrointestinal:  Negative for abdominal pain, nausea and vomiting.   Endocrine: Negative for polydipsia and polyuria.   Genitourinary:  Negative for difficulty urinating, dysuria, frequency and urgency.   Musculoskeletal:  Negative for arthralgias and myalgias.   Skin:  Negative for rash and wound.   Neurological:  Negative for weakness and light-headedness.   Psychiatric/Behavioral:  Negative for dysphoric mood. The patient is not nervous/anxious.         Physical Exam   Physical Exam  Constitutional:       General: He is not in acute distress.     Appearance: Normal appearance. He is not ill-appearing.   HENT:      Head: Normocephalic and atraumatic.      Right Ear: Ear canal and external ear normal.      Left Ear: Ear canal and external ear normal.      Nose: Nose normal.       "Mouth/Throat:      Mouth: Mucous membranes are moist.      Pharynx: Oropharynx is clear.   Eyes:      General:         Right eye: No discharge.         Left eye: No discharge.      Extraocular Movements: Extraocular movements intact.      Conjunctiva/sclera: Conjunctivae normal.   Cardiovascular:      Rate and Rhythm: Normal rate and regular rhythm.      Pulses: Normal pulses.   Pulmonary:      Effort: Pulmonary effort is normal.      Breath sounds: Normal breath sounds.   Abdominal:      General: Abdomen is flat. Bowel sounds are normal. There is no distension.      Tenderness: There is no abdominal tenderness.   Musculoskeletal:      Cervical back: Normal range of motion. No rigidity.      Right lower leg: No edema.      Left lower leg: No edema.   Skin:     General: Skin is warm and dry.      Capillary Refill: Capillary refill takes less than 2 seconds.   Neurological:      General: No focal deficit present.      Mental Status: He is alert and oriented to person, place, and time.   Psychiatric:         Mood and Affect: Mood normal.         Behavior: Behavior normal.          Last Recorded Vitals  Visit Vitals  /62   Pulse 64   Temp 36.8 °C (98.2 °F) (Temporal)   Resp 18      Visit Vitals  /62   Pulse 64   Temp 36.8 °C (98.2 °F) (Temporal)   Resp 18   Ht 1.753 m (5' 9\")   Wt 85.7 kg (189 lb)   SpO2 95%   BMI 27.91 kg/m²   Smoking Status Former   BSA 2.04 m²        Relevant Results      Results for orders placed or performed during the hospital encounter of 10/24/24 (from the past 24 hours)   POCT GLUCOSE   Result Value Ref Range    POCT Glucose 122 (H) 74 - 99 mg/dL   CBC and Auto Differential   Result Value Ref Range    WBC 14.4 (H) 4.4 - 11.3 x10*3/uL    nRBC 0.0 0.0 - 0.0 /100 WBCs    RBC 4.34 (L) 4.50 - 5.90 x10*6/uL    Hemoglobin 13.4 (L) 13.5 - 17.5 g/dL    Hematocrit 39.7 (L) 41.0 - 52.0 %    MCV 92 80 - 100 fL    MCH 30.9 26.0 - 34.0 pg    MCHC 33.8 32.0 - 36.0 g/dL    RDW 12.6 11.5 - 14.5 %    " Platelets 271 150 - 450 x10*3/uL    Neutrophils % 80.7 40.0 - 80.0 %    Immature Granulocytes %, Automated 0.3 0.0 - 0.9 %    Lymphocytes % 12.6 13.0 - 44.0 %    Monocytes % 6.1 2.0 - 10.0 %    Eosinophils % 0.1 0.0 - 6.0 %    Basophils % 0.2 0.0 - 2.0 %    Neutrophils Absolute 11.61 (H) 1.20 - 7.70 x10*3/uL    Immature Granulocytes Absolute, Automated 0.04 0.00 - 0.70 x10*3/uL    Lymphocytes Absolute 1.81 1.20 - 4.80 x10*3/uL    Monocytes Absolute 0.87 0.10 - 1.00 x10*3/uL    Eosinophils Absolute 0.01 0.00 - 0.70 x10*3/uL    Basophils Absolute 0.03 0.00 - 0.10 x10*3/uL   Magnesium   Result Value Ref Range    Magnesium 1.72 1.60 - 2.40 mg/dL   Comprehensive metabolic panel   Result Value Ref Range    Glucose 126 (H) 74 - 99 mg/dL    Sodium 138 136 - 145 mmol/L    Potassium 4.0 3.5 - 5.3 mmol/L    Chloride 104 98 - 107 mmol/L    Bicarbonate 29 21 - 32 mmol/L    Anion Gap 9 (L) 10 - 20 mmol/L    Urea Nitrogen 16 6 - 23 mg/dL    Creatinine 0.75 0.50 - 1.30 mg/dL    eGFR >90 >60 mL/min/1.73m*2    Calcium 9.0 8.6 - 10.3 mg/dL    Albumin 4.0 3.4 - 5.0 g/dL    Alkaline Phosphatase 104 33 - 136 U/L    Total Protein 6.1 (L) 6.4 - 8.2 g/dL    AST 16 9 - 39 U/L    Bilirubin, Total 0.9 0.0 - 1.2 mg/dL    ALT 24 10 - 52 U/L   D-Dimer, VTE Exclusion   Result Value Ref Range    D-Dimer, Quantitative VTE Exclusion 494 <=500 ng/mL FEU   Troponin I, High Sensitivity, Initial   Result Value Ref Range    Troponin I, High Sensitivity 66 (HH) 0 - 20 ng/L   Urinalysis with Reflex Culture and Microscopic   Result Value Ref Range    Color, Urine Yellow Light-Yellow, Yellow, Dark-Yellow    Appearance, Urine Clear Clear    Specific Gravity, Urine 1.029 1.005 - 1.035    pH, Urine 6.0 5.0, 5.5, 6.0, 6.5, 7.0, 7.5, 8.0    Protein, Urine 20 (TRACE) NEGATIVE, 10 (TRACE), 20 (TRACE) mg/dL    Glucose, Urine 50 (TRACE) (A) Normal mg/dL    Blood, Urine NEGATIVE NEGATIVE    Ketones, Urine NEGATIVE NEGATIVE mg/dL    Bilirubin, Urine NEGATIVE NEGATIVE     Urobilinogen, Urine 2 (1+) (A) Normal mg/dL    Nitrite, Urine NEGATIVE NEGATIVE    Leukocyte Esterase, Urine NEGATIVE NEGATIVE   Urinalysis Microscopic   Result Value Ref Range    WBC, Urine 1-5 1-5, NONE /HPF    RBC, Urine NONE NONE, 1-2, 3-5 /HPF    Mucus, Urine FEW Reference range not established. /LPF   Troponin, High Sensitivity, 1 Hour   Result Value Ref Range    Troponin I, High Sensitivity 57 (HH) 0 - 20 ng/L      CT head wo IV contrast    Result Date: 10/24/2024  Interpreted By:  Jesus Hernandez, STUDY: CT HEAD WO IV CONTRAST; CT CERVICAL SPINE WO IV CONTRAST;  10/24/2024 8:05 pm   INDICATION: Signs/Symptoms:presyncope; Signs/Symptoms:head injury     COMPARISON: MRI brain 07/27/2023. CT neck 05/18/2020   ACCESSION NUMBER(S): [id number]; [id number]   ORDERING CLINICIAN: CULLEN PRITCHARD   TECHNIQUE: Axial noncontrast CT images of head with coronal and sagittal reconstructed images. Axial noncontrast CT images of the cervical spine with coronal and sagittal reconstructed images.   FINDINGS: CT HEAD:   BRAIN PARENCHYMA: Gray-white differentiation is preserved. No mass-effect, midline shift or effacement of cerebral sulci. No significant white matter disease.   HEMORRHAGE: No acute intracranial hemorrhage.   VENTRICLES and EXTRA-AXIAL SPACES: The ventricles and sulci are within normal limits for brain volume. No abnormal extra-axial fluid collection.   ORBITS: The visualized orbits and globes are within normal limits.   EXTRACRANIAL SOFT TISSUES: Within normal limits.   PARANASAL SINUSES/MASTOIDS: The visualized paranasal sinuses and mastoid air cells are well aerated.   CALVARIUM: No depressed skull fracture.         CT CERVICAL SPINE:   CRANIOCERVICAL JUNCTION: Intact.   ALIGNMENT: No traumatic malalignment or traumatic facet widening.   VERTEBRAE/DISC SPACES: No acute fracture. Varying degrees of mild-to-moderate disc space height loss, greatest at C2-C3 and C6-C7. Prominent posterior disc osteophyte  complex in the left subarticular/foraminal zone at C2-C3. This results in mild-to-moderate leftward spinal canal stenosis and neural foraminal narrowing.   SOFT TISSUES: Within normal limits.   OTHER: Mild emphysematous changes in the lung apices.       CT HEAD: 1. No acute intracranial abnormality or calvarial fracture.     CT CERVICAL SPINE: 1. No acute fracture or traumatic malalignment of the cervical spine.   MACRO: None   Signed by: Jesus Hernandez 10/24/2024 8:59 PM Dictation workstation:   [id number]    CT cervical spine wo IV contrast    Result Date: 10/24/2024  Interpreted By:  Jesus Hernandez, STUDY: CT HEAD WO IV CONTRAST; CT CERVICAL SPINE WO IV CONTRAST;  10/24/2024 8:05 pm   INDICATION: Signs/Symptoms:presyncope; Signs/Symptoms:head injury     COMPARISON: MRI brain 07/27/2023. CT neck 05/18/2020   ACCESSION NUMBER(S): [id number]; [id number]   ORDERING CLINICIAN: CULLEN PRITCHARD   TECHNIQUE: Axial noncontrast CT images of head with coronal and sagittal reconstructed images. Axial noncontrast CT images of the cervical spine with coronal and sagittal reconstructed images.   FINDINGS: CT HEAD:   BRAIN PARENCHYMA: Gray-white differentiation is preserved. No mass-effect, midline shift or effacement of cerebral sulci. No significant white matter disease.   HEMORRHAGE: No acute intracranial hemorrhage.   VENTRICLES and EXTRA-AXIAL SPACES: The ventricles and sulci are within normal limits for brain volume. No abnormal extra-axial fluid collection.   ORBITS: The visualized orbits and globes are within normal limits.   EXTRACRANIAL SOFT TISSUES: Within normal limits.   PARANASAL SINUSES/MASTOIDS: The visualized paranasal sinuses and mastoid air cells are well aerated.   CALVARIUM: No depressed skull fracture.         CT CERVICAL SPINE:   CRANIOCERVICAL JUNCTION: Intact.   ALIGNMENT: No traumatic malalignment or traumatic facet widening.   VERTEBRAE/DISC SPACES: No acute fracture. Varying degrees of  mild-to-moderate disc space height loss, greatest at C2-C3 and C6-C7. Prominent posterior disc osteophyte complex in the left subarticular/foraminal zone at C2-C3. This results in mild-to-moderate leftward spinal canal stenosis and neural foraminal narrowing.   SOFT TISSUES: Within normal limits.   OTHER: Mild emphysematous changes in the lung apices.       CT HEAD: 1. No acute intracranial abnormality or calvarial fracture.     CT CERVICAL SPINE: 1. No acute fracture or traumatic malalignment of the cervical spine.   MACRO: None   Signed by: Jesus Hernandez 10/24/2024 8:59 PM Dictation workstation:   [id number]    XR chest 1 view    Result Date: 10/24/2024  STUDY: Chest Radiograph;  10/24/2024 7:35 PM INDICATION: Presyncope. COMPARISON: 08/03/2020 XR Chest.  PET CT 5/15/2021. ACCESSION NUMBER(S): [id number] ORDERING CLINICIAN: CULLEN PRITCHARD TECHNIQUE:  Frontal chest was obtained at 19:35 hours. FINDINGS: CARDIOMEDIASTINAL SILHOUETTE: Cardiomediastinal silhouette is normal in size and configuration.  LUNGS: Lungs are clear.  Hyperaeration is slightly more pronounced than on the prior mainly on the right  ABDOMEN: No remarkable upper abdominal findings.  BONES: No acute osseous changes.    Normal heart size with no signs of active pulmonary parenchymal infiltration. Hyperaeration more pronounced than on the prior mainly on the right. Signed by Juliet Oquendo DO      I personally reviewed and interpreted the above results, multiple of which contributed to my medical making decisions.        Assessment/Plan     Syncope and collapse  CAD s/p single vessel stent   DM2  HLD  Emphysema  GERD  Protein malnutrition  Hx tobacco abuse (quit smoking ~11 months ago)  -aspirin loaded in ED  -ECG without signs of ischemia, repeat in AM  -trend trops unti downtrending  -cards consult  -NPO pending cards recs  -continue statin, plavix  -holding metformin while inpatient, monitor gluc    Treatment expected to be limited by the  following social determinants of health: low health literacy, limited social support.       Sanam Ruiz MD

## 2024-10-25 NOTE — CONSULTS
"Inpatient consult to Cardiology  Consult performed by: Jerald Hill MD  Consult ordered by: Sanam Ruiz MD  Reason for consult: syncope      History Of Present Illness:    Mr. Naveed Brown is a 63 y.o. former smoker male being consulted by the Cardiology team for syncope. Patient with prior medical history significant for CAD S/P stent (around 6 years ago), hypertension, diabetes, hyperlipidemia, hypothyroidism, COPD, GERD, BPH, bipolar disorder. He endorsed a recent episode of dizziness and pre-syncopal episode while cooking. He endorses that he did not completely passed out and he remember the event. Patient not compliant with his medication. A1C 7.1%. EKG shows normal sinus rhythm with no signs of acute ischemic changes. Trop 57-66. Patient has been admitted for clinical compensation.     Last Recorded Vitals:  Vitals:    10/24/24 2336 10/25/24 0400 10/25/24 0640 10/25/24 0700   BP: 126/57 125/66  132/83   BP Location: Left arm Left arm  Left arm   Patient Position: Lying Lying  Lying   Pulse: 66 57  60   Resp: 18 18  18   Temp: 36.8 °C (98.2 °F) 36.3 °C (97.3 °F)  36.1 °C (97 °F)   TempSrc: Temporal Temporal  Temporal   SpO2: 95% 95% 95% 93%   Weight: 84.2 kg (185 lb 10 oz)      Height: 1.753 m (5' 9\")          Last Labs:  CBC - 10/24/2024:  7:30 PM  14.4 13.4 271    39.7      CMP - 10/24/2024:  7:30 PM  9.0 6.1 16 --- 0.9   _ 4.0 24 104      PTT - No results in last year.  _   _ _     Troponin I, High Sensitivity   Date/Time Value Ref Range Status   10/24/2024 08:38 PM 57 (HH) 0 - 20 ng/L Final     Comment:     Previous result verified on 10/24/2024 2008 on specimen/case [id number] called with component [facility] for procedure Troponin I, High Sensitivity, Initial with value 66 ng/L.   10/24/2024 07:30 PM 66 (HH) 0 - 20 ng/L Final     Hemoglobin A1C   Date/Time Value Ref Range Status   10/24/2024 07:30 PM 7.1 (H) See comment % Final   07/27/2023 09:36 AM 6.4 (A) % Final     Comment: "          Diagnosis of Diabetes-Adults   Non-Diabetic: < or = 5.6%   Increased risk for developing diabetes: 5.7-6.4%   Diagnostic of diabetes: > or = 6.5%  .       Monitoring of Diabetes                Age (y)     Therapeutic Goal (%)   Adults:          >18           <7.0   Pediatrics:    13-18           <7.5                   7-12           <8.0                   0- 6            7.5-8.5   American Diabetes Association. Diabetes Care 33(S1), Jan 2010.   03/13/2023 10:40 AM 6.4 (A) % Final     Comment:          Diagnosis of Diabetes-Adults   Non-Diabetic: < or = 5.6%   Increased risk for developing diabetes: 5.7-6.4%   Diagnostic of diabetes: > or = 6.5%  .       Monitoring of Diabetes                Age (y)     Therapeutic Goal (%)   Adults:          >18           <7.0   Pediatrics:    13-18           <7.5                   7-12           <8.0                   0- 6            7.5-8.5   American Diabetes Association. Diabetes Care 33(S1), Jan 2010.       LDL Calculated   Date/Time Value Ref Range Status   10/24/2024 07:30 PM 38 <=99 mg/dL Final     Comment:                                 Near   Borderline      AGE      Desirable  Optimal    High     High     Very High     0-19 Y     0 - 109     ---    110-129   >/= 130     ----    20-24 Y     0 - 119     ---    120-159   >/= 160     ----      >24 Y     0 -  99   100-129  130-159   160-189     >/=190       VLDL   Date/Time Value Ref Range Status   10/24/2024 07:30 PM 22 0 - 40 mg/dL Final   03/13/2023 10:40 AM 18 0 - 40 mg/dL Final   04/22/2021 10:10 AM 12 0 - 40 mg/dL Final   02/20/2020 08:55 AM 19 0 - 40 mg/dL Final      Last I/O:  No intake/output data recorded.    Past Cardiology Tests (Last 3 Years):  EKG:  ECG 12 lead 10/25/2024      ECG 12 Lead 10/24/2024 (Preliminary)    Echo:  Transthoracic echo (TTE) complete 10/25/2024    Ejection Fractions:  EF   Date/Time Value Ref Range Status   10/25/2024 06:30 AM 58 %      Cath:  No results found for this or any  previous visit from the past 1095 days.    Stress Test:  No results found for this or any previous visit from the past 1095 days.    Cardiac Imaging:  No results found for this or any previous visit from the past 1095 days.      Past Medical History:  He has a past medical history of Diabetes mellitus (Multi) and Dysphagia (01/30/2023).    Past Surgical History:  He has a past surgical history that includes Colonoscopy (03/12/2020); Skin graft; Finger surgery; Other surgical history (07/22/2020); Other surgical history (02/10/2020); Other surgical history (07/22/2020); and Cardiac surgery.      Social History:  He reports that he has quit smoking. His smoking use included cigarettes. He has a 40 pack-year smoking history. He has never used smokeless tobacco. He reports that he does not drink alcohol and does not use drugs.    Family History:  Family History   Problem Relation Name Age of Onset    Brain cancer Mother      Diabetes type II Father      Alzheimer's disease Father      Alzheimer's disease Sister      Dementia Sister      Colon cancer Brother          Allergies:  Patient has no known allergies.    Inpatient Medications:  Scheduled medications   Medication Dose Route Frequency    ARIPiprazole  20 mg oral Daily    atorvastatin  40 mg oral Nightly    clopidogrel  75 mg oral Daily    enoxaparin  40 mg subcutaneous q24h    FLUoxetine  20 mg oral Daily    levothyroxine  25 mcg oral Daily    polyethylene glycol  17 g oral Daily     PRN medications   Medication    ipratropium-albuteroL     Continuous Medications   Medication Dose Last Rate     Outpatient Medications:  Current Outpatient Medications   Medication Instructions    ARIPiprazole (ABILIFY) 20 mg, oral, Daily    atorvastatin (LIPITOR) 40 mg, oral, Nightly    clopidogrel (PLAVIX) 75 mg, oral, Daily    ergocalciferol (VITAMIN D-2) 1,250 mcg, oral, Once Weekly    FLUoxetine (PROZAC) 20 mg, oral, Daily    levothyroxine (SYNTHROID, LEVOXYL) 25 mcg, oral,  Daily    metFORMIN (GLUCOPHAGE) 1,000 mg, oral, Every 12 hours    nitroglycerin (Nitrostat) 0.4 mg SL tablet Place under the tongue.    OneTouch Ultra Test strip TEST BLOOD SUGAR 2-3 TIMES WEEKLY       Physical Exam:  General: alert, oriented and in no acute distress  HEENT: NC/AT; EOMI; PERRLA, external ear is normal  Neck: supple; trachea midline; no masses; no JVD  Chest: clear breath sounds bilaterally; no wheezing  Cardio: regular rhythm, S1S2 normal, no murmurs  Abdomen: Soft, non-tender, non-distension, no organomegaly  Extremities: no clubbing/cyanosis/edema  Neuro: Grossly intact     Psychiatric: Normal mood and affect      Assessment/Plan     Mr. Naveed Brown is a 63 y.o. former smoker male being consulted by the Cardiology team for syncope. Patient with prior medical history significant for CAD S/P stent (around 6 years ago), hypertension, diabetes, hyperlipidemia, hypothyroidism, COPD, GERD, BPH, bipolar disorder. He endorsed a recent episode of dizziness and pre-syncopal episode while cooking. He endorses that he did not completely passed out and he remember the event. Patient not compliant with his medication. A1C 7.1%. EKG shows normal sinus rhythm with no signs of acute ischemic changes. Trop 57-66. Patient has been admitted for clinical compensation.    Assessment    # Syncope / CAD S/P Stent  - Trop 57 - 66.  - He endorsed a recent episode of dizziness and pre-syncopal episode while cooking. He endorses that he did not completely passed out and he remember the event. Patient not compliant with his medication.   - Echocardiogram (10/25/2024) showed normal LVEF 55-60% with no wall motion abnormalities.   - Would suggest US duplex carotids and 7-day holter monitor upon discharge.  - Would suggest thyroid function assessment.  - Would suggest to keep Clopidogrel, high intensity statins - Atorvastatin 40mg daily.  - Would suggest ischemic assessment as outpatient - Left Heart Catheterization due to  prior stent history and elevated troponin.  - Follow up in Cardiology clinic.    # Diabetes  - Counseled on healthy diet and regular exercises.  - Discussed need for weight loss and the benefits.   - Keep current medications including Metformin.  - ISS.     # Hypothyroidism  - Would suggest  - Keep Levothyroxine daily     Thank you for allowing me to participate in the care of this patient. Please reach me out if you have any questions or if you need any clarifications regarding the patient's care.     Peripheral IV 10/24/24 20 G Right Antecubital (Active)   Site Assessment Clean;Dry;Intact 10/25/24 0836   Dressing Type Transparent 10/25/24 0836   Line Status Saline locked 10/25/24 0836   Dressing Status Clean;Dry 10/25/24 0836   Number of days: 1       Code Status:  Full Code    Jerald Hill MD  Cardiology

## 2024-10-26 LAB
CARDIAC TROPONIN I PNL SERPL HS: 56 NG/L (ref 0–20)
ERYTHROCYTE [DISTWIDTH] IN BLOOD BY AUTOMATED COUNT: 12.7 % (ref 11.5–14.5)
HCT VFR BLD AUTO: 42.7 % (ref 41–52)
HGB BLD-MCNC: 14.5 G/DL (ref 13.5–17.5)
MCH RBC QN AUTO: 30.8 PG (ref 26–34)
MCHC RBC AUTO-ENTMCNC: 34 G/DL (ref 32–36)
MCV RBC AUTO: 91 FL (ref 80–100)
NRBC BLD-RTO: 0 /100 WBCS (ref 0–0)
PLATELET # BLD AUTO: 297 X10*3/UL (ref 150–450)
RBC # BLD AUTO: 4.71 X10*6/UL (ref 4.5–5.9)
WBC # BLD AUTO: 16.3 X10*3/UL (ref 4.4–11.3)

## 2024-10-26 PROCEDURE — 2500000001 HC RX 250 WO HCPCS SELF ADMINISTERED DRUGS (ALT 637 FOR MEDICARE OP): Performed by: STUDENT IN AN ORGANIZED HEALTH CARE EDUCATION/TRAINING PROGRAM

## 2024-10-26 PROCEDURE — 99254 IP/OBS CNSLTJ NEW/EST MOD 60: CPT | Performed by: STUDENT IN AN ORGANIZED HEALTH CARE EDUCATION/TRAINING PROGRAM

## 2024-10-26 PROCEDURE — 36415 COLL VENOUS BLD VENIPUNCTURE: CPT | Performed by: STUDENT IN AN ORGANIZED HEALTH CARE EDUCATION/TRAINING PROGRAM

## 2024-10-26 PROCEDURE — 1200000002 HC GENERAL ROOM WITH TELEMETRY DAILY

## 2024-10-26 PROCEDURE — 85027 COMPLETE CBC AUTOMATED: CPT | Performed by: STUDENT IN AN ORGANIZED HEALTH CARE EDUCATION/TRAINING PROGRAM

## 2024-10-26 PROCEDURE — 99232 SBSQ HOSP IP/OBS MODERATE 35: CPT | Performed by: STUDENT IN AN ORGANIZED HEALTH CARE EDUCATION/TRAINING PROGRAM

## 2024-10-26 PROCEDURE — 84484 ASSAY OF TROPONIN QUANT: CPT | Performed by: STUDENT IN AN ORGANIZED HEALTH CARE EDUCATION/TRAINING PROGRAM

## 2024-10-26 RX ADMIN — LEVOTHYROXINE SODIUM 25 MCG: 25 TABLET ORAL at 09:58

## 2024-10-26 RX ADMIN — FLUOXETINE HYDROCHLORIDE 20 MG: 20 CAPSULE ORAL at 21:13

## 2024-10-26 RX ADMIN — CLOPIDOGREL 75 MG: 75 TABLET ORAL at 21:13

## 2024-10-26 RX ADMIN — ARIPIPRAZOLE 20 MG: 15 TABLET ORAL at 09:58

## 2024-10-26 RX ADMIN — ATORVASTATIN CALCIUM 40 MG: 40 TABLET, FILM COATED ORAL at 21:13

## 2024-10-26 ASSESSMENT — COGNITIVE AND FUNCTIONAL STATUS - GENERAL
MOBILITY SCORE: 24
MOBILITY SCORE: 24
DAILY ACTIVITIY SCORE: 24
DAILY ACTIVITIY SCORE: 24

## 2024-10-26 ASSESSMENT — PAIN - FUNCTIONAL ASSESSMENT
PAIN_FUNCTIONAL_ASSESSMENT: 0-10
PAIN_FUNCTIONAL_ASSESSMENT: 0-10

## 2024-10-26 ASSESSMENT — PAIN SCALES - GENERAL
PAINLEVEL_OUTOF10: 0 - NO PAIN
PAINLEVEL_OUTOF10: 0 - NO PAIN

## 2024-10-26 NOTE — PROGRESS NOTES
Subjective Data:  Patient reports feeling well, no new adverse events overnight. Patient denies any chest pain, shortness of breath, palpitations, dizziness or syncope. Patient is hemodynamically stable.     Overnight Events:    No     Objective Data:  Last Recorded Vitals:  Vitals:    10/25/24 2100 10/26/24 0600 10/26/24 0712 10/26/24 1130   BP: 100/59  94/53    BP Location: Left arm  Left arm    Patient Position: Lying  Lying    Pulse: 92  61    Resp: 18  20    Temp: 37.4 °C (99.3 °F)  36.3 °C (97.3 °F)    TempSrc: Temporal  Temporal    SpO2: 92% 91% 90% 97%   Weight:       Height:           Last Labs:  CBC - 10/26/2024: 10:19 AM  16.3 14.5 297    42.7      CMP - 10/24/2024:  7:30 PM  9.0 6.1 16 --- 0.9   _ 4.0 24 104      PTT - No results in last year.  _   _ _     TROPHS   Date/Time Value Ref Range Status   10/26/2024 10:18 AM 56 0 - 20 ng/L Final   10/24/2024 08:38 PM 57 0 - 20 ng/L Final     Comment:     Previous result verified on 10/24/2024 2008 on specimen/case [id number] called with component [facility] for procedure Troponin I, High Sensitivity, Initial with value 66 ng/L.   10/24/2024 07:30 PM 66 0 - 20 ng/L Final     HGBA1C   Date/Time Value Ref Range Status   10/24/2024 07:30 PM 7.1 See comment % Final   07/27/2023 09:36 AM 6.4 % Final     Comment:          Diagnosis of Diabetes-Adults   Non-Diabetic: < or = 5.6%   Increased risk for developing diabetes: 5.7-6.4%   Diagnostic of diabetes: > or = 6.5%  .       Monitoring of Diabetes                Age (y)     Therapeutic Goal (%)   Adults:          >18           <7.0   Pediatrics:    13-18           <7.5                   7-12           <8.0                   0- 6            7.5-8.5   American Diabetes Association. Diabetes Care 33(S1), Jan 2010.   03/13/2023 10:40 AM 6.4 % Final     Comment:          Diagnosis of Diabetes-Adults   Non-Diabetic: < or = 5.6%   Increased risk for developing diabetes: 5.7-6.4%   Diagnostic of diabetes: > or = 6.5%  .        Monitoring of Diabetes                Age (y)     Therapeutic Goal (%)   Adults:          >18           <7.0   Pediatrics:    13-18           <7.5                   7-12           <8.0                   0- 6            7.5-8.5   American Diabetes Association. Diabetes Care 33(S1), Jan 2010.       LDLCALC   Date/Time Value Ref Range Status   10/24/2024 07:30 PM 38 <=99 mg/dL Final     Comment:                                 Near   Borderline      AGE      Desirable  Optimal    High     High     Very High     0-19 Y     0 - 109     ---    110-129   >/= 130     ----    20-24 Y     0 - 119     ---    120-159   >/= 160     ----      >24 Y     0 -  99   100-129  130-159   160-189     >/=190       VLDL   Date/Time Value Ref Range Status   10/24/2024 07:30 PM 22 0 - 40 mg/dL Final   03/13/2023 10:40 AM 18 0 - 40 mg/dL Final   04/22/2021 10:10 AM 12 0 - 40 mg/dL Final   02/20/2020 08:55 AM 19 0 - 40 mg/dL Final      Last I/O:  No intake/output data recorded.    Past Cardiology Tests (Last 3 Years):  EKG:  ECG 12 lead 10/25/2024      ECG 12 Lead 10/24/2024 (Preliminary)    Echo:  Transthoracic echo (TTE) complete 10/25/2024    Ejection Fractions:  EF   Date/Time Value Ref Range Status   10/25/2024 06:30 AM 58 %      Cath:  No results found for this or any previous visit from the past 1095 days.    Stress Test:  No results found for this or any previous visit from the past 1095 days.    Cardiac Imaging:  No results found for this or any previous visit from the past 1095 days.      Inpatient Medications:  Scheduled medications   Medication Dose Route Frequency    ARIPiprazole  20 mg oral Daily    atorvastatin  40 mg oral Nightly    clopidogrel  75 mg oral Daily    enoxaparin  40 mg subcutaneous q24h    FLUoxetine  20 mg oral Daily    levothyroxine  25 mcg oral Daily    polyethylene glycol  17 g oral Daily     PRN medications   Medication    ipratropium-albuteroL     Continuous Medications   Medication Dose Last Rate      Physical Exam:  General: alert, oriented and in no acute distress  HEENT: NC/AT; EOMI; PERRLA, external ear is normal  Neck: supple; trachea midline; no masses; no JVD  Chest: clear breath sounds bilaterally; no wheezing  Cardio: regular rhythm, S1S2 normal, no murmurs  Abdomen: Soft, non-tender, non-distension, no organomegaly  Extremities: no clubbing/cyanosis/edema  Neuro: Grossly intact     Psychiatric: Normal mood and affect      Assessment/Plan     Mr. Naveed Brown is a 63 y.o. former smoker male being consulted by the Cardiology team for syncope. Patient with prior medical history significant for CAD S/P stent (around 6 years ago), hypertension, diabetes, hyperlipidemia, hypothyroidism, COPD, GERD, BPH, bipolar disorder. He endorsed a recent episode of dizziness and pre-syncopal episode while cooking. He endorses that he did not completely passed out and he remember the event. Patient not compliant with his medication. A1C 7.1%. EKG shows normal sinus rhythm with no signs of acute ischemic changes. Trop 57-66. Patient has been admitted for clinical compensation.     Assessment     # Syncope / CAD S/P Stent  - Trop 57 - 66.  - He endorsed a recent episode of dizziness and pre-syncopal episode while cooking. He endorses that he did not completely passed out and he remember the event. Patient not compliant with his medication.   - Echocardiogram (10/25/2024) showed normal LVEF 55-60% with no wall motion abnormalities.   - Would suggest US duplex carotids and 7-day holter monitor upon discharge.  - Would suggest thyroid function assessment.  - Would suggest to keep Clopidogrel, high intensity statins - Atorvastatin 40mg daily.  - Would suggest ischemic assessment as outpatient - Left Heart Catheterization due to prior stent history and elevated troponin.  - Follow up in Cardiology clinic.     # Diabetes  - Counseled on healthy diet and regular exercises.  - Discussed need for weight loss and the  benefits.   - Keep current medications including Metformin.  - ISS.      # Hypothyroidism  - Would suggest  - Keep Levothyroxine daily      Thank you for allowing me to participate in the care of this patient. Please reach me out if you have any questions or if you need any clarifications regarding the patient's care.     Peripheral IV 10/24/24 20 G Right Antecubital (Active)   Site Assessment Clean;Dry;Leaking 10/26/24 1000   Dressing Type Transparent 10/26/24 1000   Line Status Flushed 10/26/24 1000   Dressing Status Clean;Dry;Old drainage 10/26/24 1000   Number of days: 2       Code Status:  Full Code    Jerald Hill MD  Cardiology

## 2024-10-26 NOTE — CARE PLAN
The patient's goals for the shift include      The clinical goals for the shift include no nausea or dizziness through shift      Problem: Pain - Adult  Goal: Verbalizes/displays adequate comfort level or baseline comfort level  Outcome: Progressing     Problem: Safety - Adult  Goal: Free from fall injury  Outcome: Progressing     Problem: Discharge Planning  Goal: Discharge to home or other facility with appropriate resources  Outcome: Progressing     Problem: Chronic Conditions and Co-morbidities  Goal: Patient's chronic conditions and co-morbidity symptoms are monitored and maintained or improved  Outcome: Progressing     Problem: Fall/Injury  Goal: Not fall by end of shift  Outcome: Progressing  Goal: Be free from injury by end of the shift  Outcome: Progressing  Goal: Verbalize understanding of personal risk factors for fall in the hospital  Outcome: Progressing  Goal: Verbalize understanding of risk factor reduction measures to prevent injury from fall in the home  Outcome: Progressing  Goal: Pace activities to prevent fatigue by end of the shift  Outcome: Progressing     Problem: Diabetes  Goal: Maintain electrolyte levels within acceptable range throughout shift  Outcome: Progressing  Goal: Maintain glucose levels >70mg/dl to <250mg/dl throughout shift  Outcome: Progressing  Goal: No changes in neurological exam by end of shift  Outcome: Progressing  Goal: Vital signs within normal range for age by end of shift  Outcome: Progressing

## 2024-10-26 NOTE — CARE PLAN
Problem: Pain - Adult  Goal: Verbalizes/displays adequate comfort level or baseline comfort level  Outcome: Progressing   The patient's goals for the shift include  return home    The clinical goals for the shift include no nausea or dizziness through shift      Problem: Pain - Adult  Goal: Verbalizes/displays adequate comfort level or baseline comfort level  Outcome: Progressing     Problem: Safety - Adult  Goal: Free from fall injury  Outcome: Progressing     Problem: Discharge Planning  Goal: Discharge to home or other facility with appropriate resources  Outcome: Progressing     Problem: Chronic Conditions and Co-morbidities  Goal: Patient's chronic conditions and co-morbidity symptoms are monitored and maintained or improved  Outcome: Progressing     Problem: Fall/Injury  Goal: Not fall by end of shift  Outcome: Progressing  Goal: Be free from injury by end of the shift  Outcome: Progressing  Goal: Verbalize understanding of personal risk factors for fall in the hospital  Outcome: Progressing  Goal: Verbalize understanding of risk factor reduction measures to prevent injury from fall in the home  Outcome: Progressing  Goal: Pace activities to prevent fatigue by end of the shift  Outcome: Progressing     Problem: Diabetes  Goal: Maintain electrolyte levels within acceptable range throughout shift  Outcome: Progressing  Goal: Maintain glucose levels >70mg/dl to <250mg/dl throughout shift  Outcome: Progressing  Goal: No changes in neurological exam by end of shift  Outcome: Progressing  Goal: Vital signs within normal range for age by end of shift  Outcome: Progressing

## 2024-10-26 NOTE — PROGRESS NOTES
Naveed Garcia is a 63 y.o. male on day 2 of admission presenting with Syncope and collapse.      Subjective   Patient seen and examined at bedside.  No complaints today.       Objective     Last Recorded Vitals  /72 (BP Location: Right leg, Patient Position: Sitting)   Pulse 61   Temp 37.6 °C (99.7 °F) (Temporal)   Resp 16   Wt 84.2 kg (185 lb 10 oz)   SpO2 96%   Intake/Output last 3 Shifts:    Intake/Output Summary (Last 24 hours) at 10/26/2024 1740  Last data filed at 10/26/2024 0900  Gross per 24 hour   Intake 240 ml   Output --   Net 240 ml       Admission Weight  Weight: 85.7 kg (189 lb) (10/24/24 1903)    Daily Weight  10/24/24 : 84.2 kg (185 lb 10 oz)    Image Results  Carotid duplex bilateral  Preliminary Cardiology Report                  [place] [address]  Phone [phone] [phone], Fax [phone]           Preliminary Vascular Lab Report     [facility] US CAROTID ARTERY DUPLEX BILATERAL       Patient Name:     NAVEED HUGGINS        Reading Physician: [id number] Shamika Kelley MD,                    GARCIA                             RPVI  Study Date:       10/25/2024       Ordering Provider: [id number] MARIANO VAZQUEZ  MRN/PID:          [id number]         Fellow:  Accession#:       [id number]     Technologist:      Elias Salguero RVT  YOB: 1960       Technologist 2:  Gender:           M                Encounter#:        [id number]  Admission Status: Inpatient        Location           [facility]                                     Performed:       Diagnosis/ICD: Syncope and collapse-R55  CPT Codes:     64723 Cerebrovascular Carotid Duplex scan complete       Pertinent History: Syncope.       PRELIMINARY CONCLUSIONS:     Right Carotid: Findings are consistent with less than 50% stenosis of the right proximal internal carotid artery. Laminar flow seen by color Doppler.  Right external carotid artery appears patent with no evidence of stenosis. No evidence of hemodynamically significant stenosis of the right common carotid artery. The right vertebral artery is patent with antegrade flow. No evidence of hemodynamically significant stenosis in the right subclavian artery.  Left Carotid: Findings are consistent with less than 50% stenosis of the left proximal internal carotid artery. Laminar flow seen by color Doppler. Left external carotid artery appears patent with no evidence of stenosis. No evidence of hemodynamically significant stenosis of the left common carotid artery. The left vertebral artery is patent with antegrade flow. There are elevated velocities in the left subclavian artery that are suggestive of disease.     Imaging & Doppler Findings:  Right Plaque Morph: The proximal right internal carotid artery demonstrates heterogenous plaque. The right carotid bulb demonstrates heterogenous plaque.  Left Plaque Morph: The proximal left internal carotid artery demonstrates heterogenous plaque. The mid left internal carotid artery demonstrates heterogenous plaque. The left carotid bulb demonstrates heterogenous plaque.      Right                        Left    PSV      EDV                PSV      EDV  152 cm/s           CCA P    166 cm/s  132 cm/s           CCA D    135 cm/s  109 cm/s 18 cm/s   ICA P    123 cm/s 16 cm/s  94 cm/s  21 cm/s   ICA D    93 cm/s  21 cm/s  174 cm/s            ECA     147 cm/s  54 cm/s  10 cm/s Vertebral  82 cm/s  16 cm/s  182 cm/s         Subclavian 218 cm/s                     Right Left  ICA/CCA Ratio  0.8  0.9          VASCULAR PRELIMINARY REPORT  completed by Elias Salguero RVT on 10/25/2024 at 4:21:33 PM       ** Final **  ECG 12 Lead  Sinus bradycardia  Left axis deviation  Nonspecific ST abnormality  Abnormal ECG  When compared with ECG of 24-OCT-2024 14:40, (unconfirmed)  Significant changes have occurred  ECG 12 lead  Sinus bradycardia  Left  axis deviation  Nonspecific ST abnormality  Abnormal ECG  When compared with ECG of 24-OCT-2024 18:43, (unconfirmed)  No significant change was found  Confirmed by Jonathan Geiger ([id number]) on 10/25/2024 9:27:40 AM  Transthoracic echo (TTE) [place] [address]  Phone [phone] [phone], Fax [phone]    TRANSTHORACIC ECHOCARDIOGRAM REPORT    Patient Name:      DIAMOND HUGGINS GARCIA    Reading Physician:    [id number] Jonathan Geiger MD  Study Date:        10/25/2024           Ordering Provider:    [id number] ELSI BENAVIDES  MRN/PID:           [id number]             Fellow:  Accession#:        [id number]         Nurse:                Sandra Drew RN  Date of Birth/Age: 1960 / 63      Sonographer:          Marcela Gordillo RVT,                     years                                      RCS  Gender:            M                    Additional Staff:  Height:            175.26 cm            Admit Date:           10/24/2024  Weight:            40.82 kg             Admission Status:     Inpatient -                                                                Routine  BSA / BMI:         1.47 m2 / 13.29      Department Location:  [address]                     kg/m2  Blood Pressure: 126 /57 mmHg    Study Type:    TRANSTHORACIC ECHO (TTE) COMPLETE  Diagnosis/ICD: Syncope-R55  Indication:    Syncope  CPT Codes:     Echo Complete w Full Doppler-00050    Patient History:  Pertinent History: No previous echo.    Study Detail: The following Echo studies were performed: 2D, M-Mode, Doppler and                color flow. Definity used as a contrast agent for endocardial                border definition and agitated saline used as a contrast agent for                intraseptal flow evaluation. Total contrast used for this                 procedure was 2 mL via IV push. The patient was awake.       PHYSICIAN INTERPRETATION:  Left Ventricle: Left ventricular ejection fraction is normal, by visual estimate at 55-60%. There are no regional wall motion abnormalities. The left ventricular cavity size is normal. Spectral Doppler shows a normal pattern of left ventricular diastolic filling.  Left Atrium: The left atrium is normal in size. A bubble study using agitated saline was performed. Bubble study is negative.  Right Ventricle: The right ventricle is normal in size. There is normal right ventricular global systolic function.  Right Atrium: The right atrium is normal in size.  Aortic Valve: The aortic valve was not well visualized. The aortic valve dimensionless index is 0.60. There is no evidence of aortic valve regurgitation. The peak instantaneous gradient of the aortic valve is 12.7 mmHg. The mean gradient of the aortic valve is 5.0 mmHg.  Mitral Valve: The mitral valve is normal in structure. There is no evidence of mitral valve regurgitation.  Tricuspid Valve: The tricuspid valve is structurally normal. No evidence of tricuspid regurgitation.  Pulmonic Valve: The pulmonic valve is not well visualized. There is no indication of pulmonic valve regurgitation.  Pericardium: No pericardial effusion noted.  Aorta: The aortic root is normal.  Systemic Veins: The inferior vena cava appears normal in size, with IVC inspiratory collapse greater than 50%.       CONCLUSIONS:   1. Left ventricular ejection fraction is normal, by visual estimate at 55-60%.   2. There is normal right ventricular global systolic function.    QUANTITATIVE DATA SUMMARY:     2D MEASUREMENTS:         Normal Ranges:  Ao Root d:       2.60 cm (2.0-3.7cm)       LA VOLUME:                    Normal Ranges:  LA Vol A4C:        26.5 ml    (22+/-6mL/m2)  LA Vol A2C:        39.7 ml  LA Vol BP:         32.5 ml  LA Vol Index A4C:  18.0ml/m2  LA Vol Index A2C:  27.0 ml/m2  LA Vol Index BP:    22.1 ml/m2  LA Area A4C:       12.7 cm2  LA Area A2C:       15.5 cm2  LA Major Axis A4C: 5.2 cm  LA Major Axis A2C: 5.2 cm  LA Volume Index:   26.4 ml/m2  LA Vol A4C:        26.1 ml  LA Vol A2C:        38.8 ml  LA Vol Index BSA:  22.1 ml/m2       M-MODE MEASUREMENTS:         Normal Ranges:  AoV Exc:             2.00 cm (1.5-2.5cm)       AORTA MEASUREMENTS:         Normal Ranges:  AoV Exc:            2.00 cm (1.5-2.5cm)       LV SYSTOLIC FUNCTION BY 2D PLANIMETRY (MOD):                       Normal Ranges:  EF-A4C View:    58 % (>=55%)  EF-A2C View:    46 %  EF-Biplane:     53 %  EF-Visual:      58 %  LV EF Reported: 58 %       LV DIASTOLIC FUNCTION:           Normal Ranges:  MV Peak E:             0.99 m/s  (0.7-1.2 m/s)  MV Peak A:             1.15 m/s  (0.42-0.7 m/s)  E/A Ratio:             0.86      (1.0-2.2)  MV e'                  0.112 m/s (>8.0)  MV lateral e'          0.12 m/s  MV medial e'           0.11 m/s  E/e' Ratio:            8.84      (<8.0)       MITRAL VALVE:          Normal Ranges:  MV DT:        225 msec (150-240msec)       AORTIC VALVE:                      Normal Ranges:  AoV Vmax:                1.78 m/s  (<=1.7m/s)  AoV Peak P.7 mmHg (<20mmHg)  AoV Mean P.0 mmHg  (1.7-11.5mmHg)  LVOT Max Faustino:            1.08 m/s  (<=1.1m/s)  AoV VTI:                 39.60 cm  (18-25cm)  LVOT VTI:                23.80 cm  LVOT Diameter:           2.00 cm   (1.8-2.4cm)  AoV Area, VTI:           1.89 cm2  (2.5-5.5cm2)  AoV Area,Vmax:           1.91 cm2  (2.5-4.5cm2)  AoV Dimensionless Index: 0.60       RIGHT VENTRICLE:  RV Basal 3.19 cm  RV Mid   2.05 cm  RV Major 5.0 cm  TAPSE:   24.8 mm       TRICUSPID VALVE/RVSP:          Normal Ranges:  Peak TR Velocity:     2.38 m/s  RV Syst Pressure:     26 mmHg  (< 30mmHg)       PULMONIC VALVE:          Normal Ranges:  PV Accel Time:  134 msec (>120ms)  PV Max Faustino:     1.1 m/s  (0.6-0.9m/s)  PV Max P.1 mmHg       [id number] Jonathan  Juanjo TABARES  Electronically signed on 10/25/2024 at 9:21:42 AM       ** Final **      Physical Exam  Constitutional:       Appearance: Normal appearance.   HENT:      Head: Normocephalic.      Right Ear: Tympanic membrane normal.      Nose: Nose normal.      Mouth/Throat:      Mouth: Mucous membranes are moist.   Eyes:      Pupils: Pupils are equal, round, and reactive to light.   Cardiovascular:      Rate and Rhythm: Normal rate.      Pulses: Normal pulses.   Pulmonary:      Effort: Pulmonary effort is normal.   Abdominal:      Palpations: Abdomen is soft.   Genitourinary:     Penis: Normal.    Musculoskeletal:         General: Normal range of motion.      Cervical back: Normal range of motion.   Skin:     General: Skin is dry.      Capillary Refill: Capillary refill takes less than 2 seconds.   Neurological:      General: No focal deficit present.      Mental Status: He is alert.   Psychiatric:         Mood and Affect: Mood normal.         Relevant Results             Scheduled medications  ARIPiprazole, 20 mg, oral, Daily  atorvastatin, 40 mg, oral, Nightly  clopidogrel, 75 mg, oral, Daily  enoxaparin, 40 mg, subcutaneous, q24h  FLUoxetine, 20 mg, oral, Daily  levothyroxine, 25 mcg, oral, Daily  polyethylene glycol, 17 g, oral, Daily      Continuous medications     PRN medications  PRN medications: ipratropium-albuteroL  MRI  Assessment/Plan        63-year-old male With past medical history of CAD S/P stent (around 6 years ago), hypertension, diabetes, hyperlipidemia, hypothyroidism, COPD, GERD, BPH, bipolar disorder, ex-smoker presents with syncope.    Patient had troponin mildly elevated downtrending.  Patient had an echocardiogram done today shows a EF of 55 to 60%.    Investigation has been normal  Continue Plavix and statin  Cardiology consult appreciated  Continuous cardiac monitoring  Cardiac monitor as outpatient  Unable to obtain cardiac monitor and we can.  Patient will be discharged on Monday  Continue  telemetry monitor for arrhythmias    DVT prophylaxis: Lovenox  GI prophylaxis: Not on  Full code      Loglashawn Webster MD

## 2024-10-26 NOTE — PROGRESS NOTES
Pt reviewed in care rounds this morning. Pt medically ready for discharge.  Pt needs halter monitor prior to discharge. Service not available over the weekend. Pt will discharge Monday after halter monitor can be placed. Plan is for Pt to discharge to home, no need for additional supports or services.  SW to follow.

## 2024-10-27 VITALS
OXYGEN SATURATION: 97 % | HEIGHT: 69 IN | TEMPERATURE: 97.7 F | WEIGHT: 185.63 LBS | BODY MASS INDEX: 27.49 KG/M2 | HEART RATE: 60 BPM | SYSTOLIC BLOOD PRESSURE: 133 MMHG | RESPIRATION RATE: 18 BRPM | DIASTOLIC BLOOD PRESSURE: 73 MMHG

## 2024-10-27 PROCEDURE — 99232 SBSQ HOSP IP/OBS MODERATE 35: CPT | Performed by: STUDENT IN AN ORGANIZED HEALTH CARE EDUCATION/TRAINING PROGRAM

## 2024-10-27 PROCEDURE — 2500000001 HC RX 250 WO HCPCS SELF ADMINISTERED DRUGS (ALT 637 FOR MEDICARE OP): Performed by: STUDENT IN AN ORGANIZED HEALTH CARE EDUCATION/TRAINING PROGRAM

## 2024-10-27 PROCEDURE — 1200000002 HC GENERAL ROOM WITH TELEMETRY DAILY

## 2024-10-27 RX ADMIN — FLUOXETINE HYDROCHLORIDE 20 MG: 20 CAPSULE ORAL at 21:02

## 2024-10-27 RX ADMIN — CLOPIDOGREL 75 MG: 75 TABLET ORAL at 21:02

## 2024-10-27 RX ADMIN — LEVOTHYROXINE SODIUM 25 MCG: 25 TABLET ORAL at 08:50

## 2024-10-27 RX ADMIN — ATORVASTATIN CALCIUM 40 MG: 40 TABLET, FILM COATED ORAL at 21:02

## 2024-10-27 RX ADMIN — ARIPIPRAZOLE 20 MG: 15 TABLET ORAL at 08:50

## 2024-10-27 ASSESSMENT — COGNITIVE AND FUNCTIONAL STATUS - GENERAL
DAILY ACTIVITIY SCORE: 24
MOBILITY SCORE: 24
MOBILITY SCORE: 24

## 2024-10-27 ASSESSMENT — PAIN - FUNCTIONAL ASSESSMENT
PAIN_FUNCTIONAL_ASSESSMENT: 0-10
PAIN_FUNCTIONAL_ASSESSMENT: 0-10

## 2024-10-27 ASSESSMENT — PAIN SCALES - GENERAL
PAINLEVEL_OUTOF10: 0 - NO PAIN
PAINLEVEL_OUTOF10: 0 - NO PAIN

## 2024-10-27 NOTE — CARE PLAN
Problem: Pain - Adult  Goal: Verbalizes/displays adequate comfort level or baseline comfort level  Outcome: Met     Problem: Safety - Adult  Goal: Free from fall injury  Outcome: Met     Problem: Discharge Planning  Goal: Discharge to home or other facility with appropriate resources  Outcome: Met   The patient's goals for the shift include      The clinical goals for the shift include no episodes of vertigo    Over the shift, the patient did not make progress toward the following goals. Barriers to progression include . Recommendations to address these barriers include .

## 2024-10-28 ENCOUNTER — APPOINTMENT (OUTPATIENT)
Dept: CARDIOLOGY | Facility: HOSPITAL | Age: 64
End: 2024-10-28
Payer: MEDICAID

## 2024-10-28 VITALS
OXYGEN SATURATION: 93 % | WEIGHT: 185.63 LBS | SYSTOLIC BLOOD PRESSURE: 109 MMHG | DIASTOLIC BLOOD PRESSURE: 64 MMHG | HEART RATE: 57 BPM | BODY MASS INDEX: 27.49 KG/M2 | TEMPERATURE: 97.5 F | RESPIRATION RATE: 20 BRPM | HEIGHT: 69 IN

## 2024-10-28 LAB
ANION GAP SERPL CALC-SCNC: 11 MMOL/L (ref 10–20)
BODY SURFACE AREA: 2.02 M2
BUN SERPL-MCNC: 11 MG/DL (ref 6–23)
CALCIUM SERPL-MCNC: 8.8 MG/DL (ref 8.6–10.3)
CHLORIDE SERPL-SCNC: 104 MMOL/L (ref 98–107)
CO2 SERPL-SCNC: 26 MMOL/L (ref 21–32)
CREAT SERPL-MCNC: 0.64 MG/DL (ref 0.5–1.3)
EGFRCR SERPLBLD CKD-EPI 2021: >90 ML/MIN/1.73M*2
ERYTHROCYTE [DISTWIDTH] IN BLOOD BY AUTOMATED COUNT: 12.5 % (ref 11.5–14.5)
GLUCOSE SERPL-MCNC: 168 MG/DL (ref 74–99)
HCT VFR BLD AUTO: 39.5 % (ref 41–52)
HGB BLD-MCNC: 13.3 G/DL (ref 13.5–17.5)
MCH RBC QN AUTO: 30.4 PG (ref 26–34)
MCHC RBC AUTO-ENTMCNC: 33.7 G/DL (ref 32–36)
MCV RBC AUTO: 90 FL (ref 80–100)
NRBC BLD-RTO: 0 /100 WBCS (ref 0–0)
PLATELET # BLD AUTO: 321 X10*3/UL (ref 150–450)
POTASSIUM SERPL-SCNC: 3.9 MMOL/L (ref 3.5–5.3)
RBC # BLD AUTO: 4.38 X10*6/UL (ref 4.5–5.9)
SODIUM SERPL-SCNC: 137 MMOL/L (ref 136–145)
WBC # BLD AUTO: 8.8 X10*3/UL (ref 4.4–11.3)

## 2024-10-28 PROCEDURE — 93242 EXT ECG>48HR<7D RECORDING: CPT

## 2024-10-28 PROCEDURE — 99239 HOSP IP/OBS DSCHRG MGMT >30: CPT | Performed by: STUDENT IN AN ORGANIZED HEALTH CARE EDUCATION/TRAINING PROGRAM

## 2024-10-28 PROCEDURE — 93244 EXT ECG>48HR<7D REV&INTERPJ: CPT | Performed by: INTERNAL MEDICINE

## 2024-10-28 PROCEDURE — 85027 COMPLETE CBC AUTOMATED: CPT | Performed by: STUDENT IN AN ORGANIZED HEALTH CARE EDUCATION/TRAINING PROGRAM

## 2024-10-28 PROCEDURE — 36415 COLL VENOUS BLD VENIPUNCTURE: CPT | Performed by: STUDENT IN AN ORGANIZED HEALTH CARE EDUCATION/TRAINING PROGRAM

## 2024-10-28 PROCEDURE — 2500000001 HC RX 250 WO HCPCS SELF ADMINISTERED DRUGS (ALT 637 FOR MEDICARE OP): Performed by: STUDENT IN AN ORGANIZED HEALTH CARE EDUCATION/TRAINING PROGRAM

## 2024-10-28 PROCEDURE — 9420000001 HC RT PATIENT EDUCATION 5 MIN

## 2024-10-28 PROCEDURE — 80048 BASIC METABOLIC PNL TOTAL CA: CPT | Performed by: STUDENT IN AN ORGANIZED HEALTH CARE EDUCATION/TRAINING PROGRAM

## 2024-10-28 RX ADMIN — ARIPIPRAZOLE 20 MG: 15 TABLET ORAL at 10:13

## 2024-10-28 RX ADMIN — LEVOTHYROXINE SODIUM 25 MCG: 25 TABLET ORAL at 10:13

## 2024-10-28 ASSESSMENT — ACTIVITIES OF DAILY LIVING (ADL): LACK_OF_TRANSPORTATION: NO

## 2024-10-28 NOTE — CARE PLAN
Problem: Fall/Injury  Goal: Not fall by end of shift  Outcome: Met  Goal: Be free from injury by end of the shift  Outcome: Met   The patient's goals for the shift include      The clinical goals for the shift include no c/o dizziness

## 2024-10-28 NOTE — DOCUMENTATION CLARIFICATION NOTE
"    PATIENT:               DIAMOND GARCIA  ACCT #:                  [id number]  MRN:                       [id number]  :                       1960  ADMIT DATE:       10/24/2024 6:39 PM  DISCH DATE:        10/28/2024 12:53 PM  RESPONDING PROVIDER #:        [id number]          PROVIDER RESPONSE TEXT:    Unspecified degree of malnutrition ruled in for this admission    CDI QUERY TEXT:    Clarification    Instruction:    Based on your assessment of the patient and the clinical information, please provide the requested documentation by clicking on the appropriate radio button and enter any additional information if prompted.    Question: Please further clarify the diagnosis of Malnutrition as    When answering this query, please exercise your independent professional judgment. The fact that a question is being asked, does not imply that any particular answer is desired or expected.    The patient's clinical indicators include:  Clinical Information: 63-year-old male presents with presyncope.    Clinical Indicators: H&P notes \"Protein malnutrition\".  Weight per bed scale 10/24- 84.2 kg- BMI 27.2.  Cardiology consult notes \"Discussed need for weight loss\".  No nutrition assessment to date- no documentation of weight loss    Treatment: N/A    Risk Factors: DM 2, CAD with previous stents, HLD, and noncompliance with medications  Options provided:  -- Unspecified degree of malnutrition ruled out after workup  -- Unspecified degree of malnutrition ruled in for this admission  -- Other - I will add my own diagnosis  -- Refer to Clinical Documentation Reviewer    Query created by: Lashawn Mai on 10/28/2024 9:31 AM      Electronically signed by:  MARIANO VAZQUEZ MD 10/28/2024 5:51 PM          "

## 2024-10-28 NOTE — DISCHARGE SUMMARY
Discharge Diagnosis  Syncope and collapse    Issues Requiring Follow-Up  Cardiology follow-up    Discharge Meds     Medication List      START taking these medications     ergocalciferol 1.25 MG (11282 UT) capsule; Commonly known as: Vitamin   D-2; Take 1 capsule (1,250 mcg) by mouth 1 (one) time per week.     CONTINUE taking these medications     ARIPiprazole 20 mg tablet; Commonly known as: Abilify; Take 1 tablet (20   mg) by mouth once daily.   atorvastatin 40 mg tablet; Commonly known as: Lipitor; Take 1 tablet (40   mg) by mouth once daily at bedtime.   clopidogrel 75 mg tablet; Commonly known as: Plavix; Take 1 tablet (75   mg) by mouth once daily.   FLUoxetine 20 mg capsule; Commonly known as: PROzac; Take 1 capsule (20   mg) by mouth once daily.   levothyroxine 25 mcg tablet; Commonly known as: Synthroid, Levoxyl; Take   1 tablet (25 mcg) by mouth once daily.   metFORMIN 1,000 mg tablet; Commonly known as: Glucophage; Take 1 tablet   (1,000 mg) by mouth every 12 hours.   nitroglycerin 0.4 mg SL tablet; Commonly known as: Nitrostat   OneTouch Ultra Test strip; Generic drug: blood sugar diagnostic; Notes   to patient: Continue per home routine       Test Results Pending At Discharge  Pending Labs       Order Current Status    Extra Urine Gray Tube Collected (10/24/24 2022)    Urinalysis with Reflex Culture and Microscopic In process            Hospital Course  63-year-old male with a past medical history of type 2 diabetes mellitus on metformin, current smoker history of coronary artery s/p stent, hypertension, diabetes mellitus, hyperlipidemia, hypothyroidism, COPD, GERD, BPH, bipolar disorder presents with syncopal episode.  During given the hospital patient was started on aspirin statins and cardiology evaluation was done.  Patient echocardiogram was normal limits, thyroid ultrasound was normal.  Patient was already started to have a cardiac evaluation with cardiac cath and Holter monitoring outpatient.   Patient was monitored for 20 to 48 hours.  No arrhythmias noted on cardiac monitoring.  Patient be sent home with 7-day Holter monitor.  Patient recommended to follow-up with cardiologist.  Patient's vitals remained stable and discharged home in satisfactory condition    Pertinent Physical Exam At Time of Discharge  Physical Exam  Constitutional:       Appearance: Normal appearance.   HENT:      Head: Normocephalic and atraumatic.      Right Ear: Tympanic membrane normal.      Nose: Nose normal.      Mouth/Throat:      Mouth: Mucous membranes are moist.   Eyes:      Pupils: Pupils are equal, round, and reactive to light.   Cardiovascular:      Rate and Rhythm: Normal rate.      Pulses: Normal pulses.   Pulmonary:      Effort: Pulmonary effort is normal.   Abdominal:      Palpations: Abdomen is soft.   Musculoskeletal:         General: Normal range of motion.   Skin:     General: Skin is warm.      Capillary Refill: Capillary refill takes less than 2 seconds.   Neurological:      General: No focal deficit present.      Mental Status: He is alert.         Outpatient Follow-Up  Future Appointments   Date Time Provider Department Center   11/19/2024  2:15 PM Jerald Hill MD [id number] [facility]   11/27/2024  2:40 PM Sadie Rm APRN-CNP [id number] [facility]         Edith Webster MD

## 2024-10-28 NOTE — NURSING NOTE
Discharge Note: 10/28/2024 1253 Holter monitor in place. Declines wheelchair, ambulates to private car accompanied by PCT and visitor, personal belongings taken by pt, no distress noted, no complaints voiced. Og GORMAN

## 2024-10-28 NOTE — NURSING NOTE
Pt up walking in room, AAOx4. Denies pain, SOB, or dizziness. Education provided re: halter monitor.

## 2024-10-28 NOTE — NURSING NOTE
Discharge Note: 10/28/2024 120 AVS and pt responsibilities reviewed with pt and copy given. Syncope, chest pain, education reviewed with pt and information sheets given. Pt verbalizes understanding of instructions received, verbalizes understanding of when to seek medical attention, denies any home going or personal care needs. Denies further questions or concerns. Reviewed follow up appts with pt and verbalizes understanding. Pt up ad grisel in room, gait steady. Og GORMAN

## 2024-10-28 NOTE — PROGRESS NOTES
10/28/24 1240   Discharge Planning   Living Arrangements Friends   Support Systems Friends/neighbors   Assistance Needed none   Type of Residence Private residence   Number of Stairs to Enter Residence 3   Number of Stairs Within Residence 0   Do you have animals or pets at home? Yes   Type of Animals or Pets 1 dog   Who is requesting discharge planning? Provider   Home or Post Acute Services None   Expected Discharge Disposition Home   Does the patient need discharge transport arranged? No   Financial Resource Strain   How hard is it for you to pay for the very basics like food, housing, medical care, and heating? Not very   Housing Stability   In the last 12 months, was there a time when you were not able to pay the mortgage or rent on time? N   In the past 12 months, how many times have you moved where you were living? 0   At any time in the past 12 months, were you homeless or living in a shelter (including now)? N   Transportation Needs   In the past 12 months, has lack of transportation kept you from medical appointments or from getting medications? no   In the past 12 months, has lack of transportation kept you from meetings, work, or from getting things needed for daily living? No     Spoke with pt explained TCC role in Care Transitions and verified address, phone number and emergency contact information. PCP is Sadie Rm and preferred pharmacy is [facility], denies issues obtaining or affording medications and takes as ordered. Pt is independent, lives at home and feels safe. Pt continues to deny needs and plans to return home no new needs. CT to follow. Victoirna Diez BSN/RN-TCC

## 2024-10-28 NOTE — NURSING NOTE
Pt AMB in room, denies new or worsening pain, SOB, or dizziness. Pt is dressed, but IV still in. Education provided re: the discharge process and what to expect with a halter monitor.

## 2024-10-30 ENCOUNTER — PATIENT OUTREACH (OUTPATIENT)
Dept: PRIMARY CARE | Facility: CLINIC | Age: 64
End: 2024-10-30
Payer: MEDICAID

## 2024-11-01 ENCOUNTER — APPOINTMENT (OUTPATIENT)
Dept: PRIMARY CARE | Facility: CLINIC | Age: 64
End: 2024-11-01
Payer: MEDICAID

## 2024-11-01 LAB
ATRIAL RATE: 58 BPM
P AXIS: 71 DEGREES
P OFFSET: 209 MS
P ONSET: 153 MS
PR INTERVAL: 142 MS
Q ONSET: 224 MS
QRS COUNT: 10 BEATS
QRS DURATION: 88 MS
QT INTERVAL: 428 MS
QTC CALCULATION(BAZETT): 420 MS
QTC FREDERICIA: 423 MS
R AXIS: -39 DEGREES
T AXIS: 15 DEGREES
T OFFSET: 438 MS
VENTRICULAR RATE: 58 BPM

## 2024-11-04 ENCOUNTER — APPOINTMENT (OUTPATIENT)
Dept: PRIMARY CARE | Facility: CLINIC | Age: 64
End: 2024-11-04
Payer: MEDICAID

## 2024-11-04 VITALS
HEIGHT: 69 IN | BODY MASS INDEX: 27.25 KG/M2 | DIASTOLIC BLOOD PRESSURE: 72 MMHG | HEART RATE: 62 BPM | WEIGHT: 184 LBS | SYSTOLIC BLOOD PRESSURE: 119 MMHG

## 2024-11-04 DIAGNOSIS — F32.1 DEPRESSION, MAJOR, SINGLE EPISODE, MODERATE (MULTI): ICD-10-CM

## 2024-11-04 DIAGNOSIS — E11.69 TYPE 2 DIABETES MELLITUS WITH OTHER SPECIFIED COMPLICATION, WITHOUT LONG-TERM CURRENT USE OF INSULIN: ICD-10-CM

## 2024-11-04 DIAGNOSIS — E55.9 VITAMIN D DEFICIENCY: ICD-10-CM

## 2024-11-04 DIAGNOSIS — J43.9 PULMONARY EMPHYSEMA, UNSPECIFIED EMPHYSEMA TYPE (MULTI): ICD-10-CM

## 2024-11-04 DIAGNOSIS — Z09 HOSPITAL DISCHARGE FOLLOW-UP: Primary | ICD-10-CM

## 2024-11-04 DIAGNOSIS — R55 SYNCOPE AND COLLAPSE: ICD-10-CM

## 2024-11-04 DIAGNOSIS — I25.10 CAD IN NATIVE ARTERY: ICD-10-CM

## 2024-11-04 PROCEDURE — 3074F SYST BP LT 130 MM HG: CPT | Performed by: PHYSICIAN ASSISTANT

## 2024-11-04 PROCEDURE — 3051F HG A1C>EQUAL 7.0%<8.0%: CPT | Performed by: PHYSICIAN ASSISTANT

## 2024-11-04 PROCEDURE — 3078F DIAST BP <80 MM HG: CPT | Performed by: PHYSICIAN ASSISTANT

## 2024-11-04 PROCEDURE — 99495 TRANSJ CARE MGMT MOD F2F 14D: CPT | Performed by: PHYSICIAN ASSISTANT

## 2024-11-04 PROCEDURE — 3048F LDL-C <100 MG/DL: CPT | Performed by: PHYSICIAN ASSISTANT

## 2024-11-04 PROCEDURE — 3008F BODY MASS INDEX DOCD: CPT | Performed by: PHYSICIAN ASSISTANT

## 2024-11-04 RX ORDER — NITROGLYCERIN 0.4 MG/1
0.4 TABLET SUBLINGUAL EVERY 5 MIN PRN
Qty: 25 TABLET | Refills: 0 | Status: SHIPPED | OUTPATIENT
Start: 2024-11-04

## 2024-11-04 ASSESSMENT — ENCOUNTER SYMPTOMS
NAUSEA: 0
SHORTNESS OF BREATH: 0
CHILLS: 0
CONFUSION: 0
FLANK PAIN: 0
FREQUENCY: 0
TREMORS: 0
PALPITATIONS: 0
EYE REDNESS: 0
DYSPHORIC MOOD: 1
COUGH: 0
WOUND: 0
CONSTIPATION: 0
FATIGUE: 0
BRUISES/BLEEDS EASILY: 0
CHEST TIGHTNESS: 0
FEVER: 0
DIZZINESS: 0
WHEEZING: 0
NUMBNESS: 0
SLEEP DISTURBANCE: 0
VOMITING: 0
SORE THROAT: 0
NECK PAIN: 0
EYE DISCHARGE: 0
DIARRHEA: 0
SINUS PAIN: 0
HEADACHES: 0
ABDOMINAL PAIN: 0
RHINORRHEA: 0
BACK PAIN: 0

## 2024-11-04 ASSESSMENT — PATIENT HEALTH QUESTIONNAIRE - PHQ9
SUM OF ALL RESPONSES TO PHQ9 QUESTIONS 1 AND 2: 4
10. IF YOU CHECKED OFF ANY PROBLEMS, HOW DIFFICULT HAVE THESE PROBLEMS MADE IT FOR YOU TO DO YOUR WORK, TAKE CARE OF THINGS AT HOME, OR GET ALONG WITH OTHER PEOPLE: SOMEWHAT DIFFICULT
8. MOVING OR SPEAKING SO SLOWLY THAT OTHER PEOPLE COULD HAVE NOTICED. OR THE OPPOSITE, BEING SO FIGETY OR RESTLESS THAT YOU HAVE BEEN MOVING AROUND A LOT MORE THAN USUAL: SEVERAL DAYS
SUM OF ALL RESPONSES TO PHQ QUESTIONS 1-9: 7
9. THOUGHTS THAT YOU WOULD BE BETTER OFF DEAD, OR OF HURTING YOURSELF: NOT AT ALL
2. FEELING DOWN, DEPRESSED OR HOPELESS: SEVERAL DAYS
7. TROUBLE CONCENTRATING ON THINGS, SUCH AS READING THE NEWSPAPER OR WATCHING TELEVISION: SEVERAL DAYS
3. TROUBLE FALLING OR STAYING ASLEEP OR SLEEPING TOO MUCH: NOT AT ALL
1. LITTLE INTEREST OR PLEASURE IN DOING THINGS: NEARLY EVERY DAY
5. POOR APPETITE OR OVEREATING: NOT AT ALL
6. FEELING BAD ABOUT YOURSELF - OR THAT YOU ARE A FAILURE OR HAVE LET YOURSELF OR YOUR FAMILY DOWN: NOT AT ALL
4. FEELING TIRED OR HAVING LITTLE ENERGY: SEVERAL DAYS

## 2024-11-04 NOTE — PROGRESS NOTES
Subjective   Patient ID: Naveed Brown is a 63 y.o. male who presents for Hospital Follow-up (F/U HOSPITAL DISCHARGE 10/28/24 - SYNCOPE AND COLLAPSE. 7 DAY HOLTER MONITOR WAS PLACED AT DISCHARGE AND PATIENT RETURNED DEVICE TO THE HOSPITAL TODAY TO HAVE INFORMATION DOWNLOADED. PENDING APPOINTMENT WITH CARDIO, DR. SUTTON, 11/19/24. POSITIVE PHQ2/9)    HPI    Patient presents today as sadie balderas patient for     Hosp discharged follow up   [facility] oct 24, 2024 through oct 28, 2024 for syncope   He was referred to cardio and visit is set for nov 19   He was advised to restart vit D - he has been off for sometime but due for labs - advised he wait and get updated labs    Other meds were to be continued  DM - controlled  CAD - following with cardio (referral done in the ER)  COPD- stable   Mental health - on meds - score of 7 Nov 2024 - he does note depression still and was referred last month to psych awaiting a visit with them   Echo was Wnl  Thyroid US was Wnl  Cardiac eval was started - holter returned today - waiting for results   Heart cath years ago - consider need for repeat   He has a follow up set with Sadie balderas nov 27 with labs   He denies additional episodes since this spell    Med check     Preventative testing   PSA   Colon   Fall   Phq2         Patient Active Problem List   Diagnosis    Anxiety    BPH (benign prostatic hyperplasia)    CAD in native artery    Depression, major, single episode, moderate (Multi)    DM2 (diabetes mellitus, type 2) (Multi)    Emphysema/COPD (Multi)    Excessive daytime sleepiness    Fatigue    GERD (gastroesophageal reflux disease)    High mean corpuscular hemoglobin concentration (MCHC)    History of colon polyps    Hypocalcemia    Lateral epicondylitis of left elbow    Lipoma of skin and subcutaneous tissue of neck    Low iron    Mixed hyperlipidemia due to type 2 diabetes mellitus (Multi)    Hypothyroidism    Unexplained weight loss    Urinary hesitancy    Vitamin B12  deficiency    Vitamin D deficiency    Memory impairment    Bipolar 1 disorder, depressed (Multi)    Syncope and collapse       Review of Systems   Constitutional:  Negative for chills, fatigue and fever.   HENT:  Negative for congestion, rhinorrhea, sinus pain, sore throat and tinnitus.    Eyes:  Negative for discharge, redness and visual disturbance.   Respiratory:  Negative for cough, chest tightness, shortness of breath and wheezing.    Cardiovascular:  Negative for chest pain, palpitations and leg swelling.   Gastrointestinal:  Negative for abdominal pain, constipation, diarrhea, nausea and vomiting.   Endocrine: Negative for cold intolerance and heat intolerance.   Genitourinary:  Negative for flank pain, frequency and urgency.   Musculoskeletal:  Negative for back pain, gait problem and neck pain.   Skin:  Negative for rash and wound.   Neurological:  Positive for syncope. Negative for dizziness, tremors, numbness and headaches.   Hematological:  Does not bruise/bleed easily.   Psychiatric/Behavioral:  Positive for dysphoric mood. Negative for confusion, sleep disturbance and suicidal ideas.        Past Medical History:   Diagnosis Date    Diabetes mellitus (Multi)     Dysphagia 01/30/2023       Past Surgical History:   Procedure Laterality Date    CARDIAC SURGERY      COLONOSCOPY  03/12/2020    REPEAT 3  YEARS. FINAL DIAGNOSIS A.  DECSENDING COLON POLYP, POLYPECTOMY: --TUBULAR ADENOMA.  B.  RECTAL BIOPSY: --COLONIC MUCOSA WITH PROMINENT LYMPHOID AGGREGATES.  REPEAT 3 YEARS    FINGER SURGERY      RIGHT THUMB SURGERY    OTHER SURGICAL HISTORY  07/22/2020    Bone transplantation    OTHER SURGICAL HISTORY  02/10/2020    Surgery    OTHER SURGICAL HISTORY  07/22/2020    Cardiac catheterization    SKIN GRAFT         Family History   Problem Relation Name Age of Onset    Brain cancer Mother      Diabetes type II Father      Alzheimer's disease Father      Alzheimer's disease Sister      Dementia Sister      Colon  "cancer Brother         Social History     Tobacco Use    Smoking status: Former     Current packs/day: 1.00     Average packs/day: 1 pack/day for 40.0 years (40.0 ttl pk-yrs)     Types: Cigarettes    Smokeless tobacco: Never   Vaping Use    Vaping status: Never Used   Substance Use Topics    Alcohol use: Never    Drug use: Never       No Known Allergies    Current Outpatient Medications   Medication Sig Dispense Refill    ARIPiprazole (Abilify) 20 mg tablet Take 1 tablet (20 mg) by mouth once daily. 90 tablet 1    atorvastatin (Lipitor) 40 mg tablet Take 1 tablet (40 mg) by mouth once daily at bedtime. 90 tablet 3    clopidogrel (Plavix) 75 mg tablet Take 1 tablet (75 mg) by mouth once daily. 90 tablet 3    FLUoxetine (PROzac) 20 mg capsule Take 1 capsule (20 mg) by mouth once daily. 90 capsule 1    levothyroxine (Synthroid, Levoxyl) 25 mcg tablet Take 1 tablet (25 mcg) by mouth once daily. 90 tablet 3    metFORMIN (Glucophage) 1,000 mg tablet Take 1 tablet (1,000 mg) by mouth every 12 hours. (Patient taking differently: Take 1 tablet (1,000 mg) by mouth once daily at bedtime.) 180 tablet 0    ergocalciferol (Vitamin D-2) 1.25 MG (78269 UT) capsule Take 1 capsule (1,250 mcg) by mouth 1 (one) time per week. (Patient not taking: Reported on 11/4/2024) 13 capsule 3    nitroglycerin (Nitrostat) 0.4 mg SL tablet Place under the tongue. (Patient not taking: Reported on 11/4/2024)      OneTouch Ultra Test strip TEST BLOOD SUGAR 2-3 TIMES WEEKLY (Patient not taking: Reported on 11/4/2024)       No current facility-administered medications for this visit.       Objective   /72   Pulse 62   Ht 1.753 m (5' 9\")   Wt 83.5 kg (184 lb)   BMI 27.17 kg/m²     Physical Exam  Vitals reviewed.   Constitutional:       Appearance: Normal appearance.   HENT:      Head: Normocephalic.      Right Ear: External ear normal.      Left Ear: External ear normal.      Nose: Nose normal. No congestion or rhinorrhea.      Mouth/Throat:    "   Mouth: Mucous membranes are moist.   Eyes:      Extraocular Movements: Extraocular movements intact.      Conjunctiva/sclera: Conjunctivae normal.      Pupils: Pupils are equal, round, and reactive to light.   Cardiovascular:      Rate and Rhythm: Normal rate and regular rhythm.      Pulses: Normal pulses.   Pulmonary:      Effort: Pulmonary effort is normal.      Breath sounds: Normal breath sounds.   Abdominal:      General: Bowel sounds are normal.      Palpations: Abdomen is soft.      Tenderness: There is no abdominal tenderness. There is no right CVA tenderness or left CVA tenderness.   Musculoskeletal:         General: No tenderness. Normal range of motion.      Cervical back: Normal range of motion and neck supple. No tenderness.   Skin:     General: Skin is warm and dry.   Neurological:      General: No focal deficit present.      Mental Status: He is alert and oriented to person, place, and time.   Psychiatric:         Mood and Affect: Mood normal.         Behavior: Behavior normal.       Testing   Reviewed hosp notes   Reviewed labs and testing       Impression    MDM    1) COMPLEXITY: 1 UNDIAGNOSED NEW PROBLEM WITH UNCERTAIN PROGNOSIS  2)DATA: TESTS INTERPRETED AND OR ORDERED, TOOK INDEPENDENT HISTORY OR RECORDS REVIEWED  3)RISK: MODERATE RISK DUE TO NATURE OF MEDICAL CONDITIONS/COMORBIDITY OR MEDICATIONS ORDERED OR SURGICAL OR PROCEDURE REFERRAL, .     Reviewed labs and Testing on file   Patient to follow diet low in cholesterol, fat, and sodium.    Patient is advised to increase Exercise.  Patient is recommended to lose weight.  Reviewed Meds and discussed common side effects  Continue as directed   Unsure of syncope cause - cont with cardio work up   Will look into psych referral - consider this is etiology   Consider need for EEG - neuro etiology as well   Patient is strongly advised to be compliant with recommendations.    Return to Clinic sooner if needed.  Patient denies further  questions/concerns at this time     Assessment/Plan   Problem List Items Addressed This Visit             ICD-10-CM    CAD in native artery I25.10    Relevant Medications    nitroglycerin (Nitrostat) 0.4 mg SL tablet    Depression, major, single episode, moderate (Multi) F32.1    DM2 (diabetes mellitus, type 2) (Multi) E11.9    Emphysema/COPD (Multi) J43.9    Vitamin D deficiency E55.9    Syncope and collapse R55     Other Visit Diagnoses         Codes    Hospital discharge follow-up    -  Primary Z09             FU as before   Please look into psych referral - done 10/16/2024

## 2024-11-06 ENCOUNTER — PATIENT OUTREACH (OUTPATIENT)
Dept: PRIMARY CARE | Facility: CLINIC | Age: 64
End: 2024-11-06

## 2024-11-06 ENCOUNTER — OUTSIDE PROCEDURE (OUTPATIENT)
Dept: PRIMARY CARE | Facility: CLINIC | Age: 64
End: 2024-11-06
Payer: MEDICAID

## 2024-11-06 DIAGNOSIS — R55 SYNCOPE AND COLLAPSE: Primary | ICD-10-CM

## 2024-11-06 LAB — BODY SURFACE AREA: 2.02 M2

## 2024-11-06 PROCEDURE — 93244 EXT ECG>48HR<7D REV&INTERPJ: CPT | Performed by: INTERNAL MEDICINE

## 2024-11-06 NOTE — PROGRESS NOTES
Call regarding appt. with PCP on 11/4/2024 after hospitalization. Spoke with pt and Cassandra.  At time of outreach call the patient feels as if their condition has improved since last visit.  Reviewed the PCP appointment with the pt and addressed any questions or concerns.  Pt and Cassandra informed [facility].    Verified upcoming appts;  -11/19/2024 1415  -11/27/2024 1440 PCP    Pt and Cassandra deny any questions, needs, or concerns at this time. They are encouraged to call if questions or needs arise.

## 2024-11-19 ENCOUNTER — APPOINTMENT (OUTPATIENT)
Dept: CARDIOLOGY | Facility: CLINIC | Age: 64
End: 2024-11-19
Payer: MEDICAID

## 2024-11-19 VITALS
OXYGEN SATURATION: 99 % | DIASTOLIC BLOOD PRESSURE: 68 MMHG | HEART RATE: 62 BPM | SYSTOLIC BLOOD PRESSURE: 120 MMHG | HEIGHT: 69 IN | WEIGHT: 186 LBS | BODY MASS INDEX: 27.55 KG/M2

## 2024-11-19 DIAGNOSIS — I10 HYPERTENSION, UNSPECIFIED TYPE: ICD-10-CM

## 2024-11-19 DIAGNOSIS — R55 SYNCOPE AND COLLAPSE: Primary | ICD-10-CM

## 2024-11-19 DIAGNOSIS — E78.5 HYPERLIPIDEMIA, UNSPECIFIED HYPERLIPIDEMIA TYPE: ICD-10-CM

## 2024-11-19 DIAGNOSIS — I25.10 CAD IN NATIVE ARTERY: ICD-10-CM

## 2024-11-19 DIAGNOSIS — E11.319 TYPE 2 DIABETES MELLITUS WITH RETINOPATHY WITHOUT MACULAR EDEMA, UNSPECIFIED LATERALITY, UNSPECIFIED RETINOPATHY SEVERITY, UNSPECIFIED WHETHER LONG TERM INSULIN USE: ICD-10-CM

## 2024-11-19 PROCEDURE — 3051F HG A1C>EQUAL 7.0%<8.0%: CPT | Performed by: STUDENT IN AN ORGANIZED HEALTH CARE EDUCATION/TRAINING PROGRAM

## 2024-11-19 PROCEDURE — 99204 OFFICE O/P NEW MOD 45 MIN: CPT | Performed by: STUDENT IN AN ORGANIZED HEALTH CARE EDUCATION/TRAINING PROGRAM

## 2024-11-19 PROCEDURE — 3074F SYST BP LT 130 MM HG: CPT | Performed by: STUDENT IN AN ORGANIZED HEALTH CARE EDUCATION/TRAINING PROGRAM

## 2024-11-19 PROCEDURE — 3008F BODY MASS INDEX DOCD: CPT | Performed by: STUDENT IN AN ORGANIZED HEALTH CARE EDUCATION/TRAINING PROGRAM

## 2024-11-19 PROCEDURE — 3048F LDL-C <100 MG/DL: CPT | Performed by: STUDENT IN AN ORGANIZED HEALTH CARE EDUCATION/TRAINING PROGRAM

## 2024-11-19 PROCEDURE — 3078F DIAST BP <80 MM HG: CPT | Performed by: STUDENT IN AN ORGANIZED HEALTH CARE EDUCATION/TRAINING PROGRAM

## 2024-11-19 PROCEDURE — 99406 BEHAV CHNG SMOKING 3-10 MIN: CPT | Performed by: STUDENT IN AN ORGANIZED HEALTH CARE EDUCATION/TRAINING PROGRAM

## 2024-11-19 PROCEDURE — 93000 ELECTROCARDIOGRAM COMPLETE: CPT | Performed by: STUDENT IN AN ORGANIZED HEALTH CARE EDUCATION/TRAINING PROGRAM

## 2024-11-19 NOTE — PROGRESS NOTES
Chief Complaint   Patient presents with    Hospital Follow-up     HPI:  I was requested by Dr. Rm to evaluate this patient in consultation for cardiac assessment.    Mr. Naveed Brown is a 64 y.o. year old former smoker diabetic male patient with past medical history significant for syncope, coronary artery disease status (CAD) S/P stent (around 6 years ago),  post PCI, hypertension, diabetes, hyperlipidemia, hypothyroidism, GERD, bipolar disorder, depression, anxiety, BPH, coming for establishment of care. He endorsed a recent episode of dizziness and pre-syncopal episode while cooking. He endorses that he did not completely passed out and he remember the event. Patient not compliant with his medication. A1C 7.1%. Trop 57-66. Patient has been admitted for clinical compensation. He has been discharged uneventfully.   EKG showed bradycardic sinus rhythm with non-specific ST-T changes.     Past Medical History  Past Medical History:   Diagnosis Date    Diabetes mellitus (Multi)     Dysphagia 01/30/2023       Past Surgical History  Past Surgical History:   Procedure Laterality Date    CARDIAC SURGERY      COLONOSCOPY  03/12/2020    REPEAT 3  YEARS. FINAL DIAGNOSIS A.  DECSENDING COLON POLYP, POLYPECTOMY: --TUBULAR ADENOMA.  B.  RECTAL BIOPSY: --COLONIC MUCOSA WITH PROMINENT LYMPHOID AGGREGATES.  REPEAT 3 YEARS    FINGER SURGERY      RIGHT THUMB SURGERY    OTHER SURGICAL HISTORY  07/22/2020    Bone transplantation    OTHER SURGICAL HISTORY  02/10/2020    Surgery    OTHER SURGICAL HISTORY  07/22/2020    Cardiac catheterization    SKIN GRAFT         Past Family History  Family History   Problem Relation Name Age of Onset    Brain cancer Mother      Diabetes type II Father      Alzheimer's disease Father      Alzheimer's disease Sister      Dementia Sister      Colon cancer Brother         Allergy History  No Known Allergies    Past Social History  Social History     Socioeconomic History    Marital status: Single    Occupational History    Occupation:    Tobacco Use    Smoking status: Former     Current packs/day: 1.00     Average packs/day: 1 pack/day for 40.0 years (40.0 ttl pk-yrs)     Types: Cigarettes    Smokeless tobacco: Never   Vaping Use    Vaping status: Never Used   Substance and Sexual Activity    Alcohol use: Never    Drug use: Never     Social Drivers of Health     Financial Resource Strain: Low Risk  (10/28/2024)    Overall Financial Resource Strain (CARDIA)     Difficulty of Paying Living Expenses: Not very hard   Food Insecurity: Patient Declined (10/24/2024)    Hunger Vital Sign     Worried About Running Out of Food in the Last Year: Patient declined     Ran Out of Food in the Last Year: Patient declined   Transportation Needs: No Transportation Needs (10/28/2024)    PRAPARE - Transportation     Lack of Transportation (Medical): No     Lack of Transportation (Non-Medical): No   Physical Activity: Inactive (10/24/2024)    Exercise Vital Sign     Days of Exercise per Week: 0 days     Minutes of Exercise per Session: 0 min   Intimate Partner Violence: Not At Risk (10/24/2024)    Humiliation, Afraid, Rape, and Kick questionnaire     Fear of Current or Ex-Partner: No     Emotionally Abused: No     Physically Abused: No     Sexually Abused: No   Housing Stability: Low Risk  (10/28/2024)    Housing Stability Vital Sign     Unable to Pay for Housing in the Last Year: No     Number of Times Moved in the Last Year: 0     Homeless in the Last Year: No       Social History     Tobacco Use   Smoking Status Former    Current packs/day: 1.00    Average packs/day: 1 pack/day for 40.0 years (40.0 ttl pk-yrs)    Types: Cigarettes   Smokeless Tobacco Never       Review of Systems:  A total of 12 systems have been reviewed and are negative except for the aforementioned findings described in HPI.     Objective Data:  Last Recorded Vitals:  Vitals:    11/19/24 1409   BP: 120/68   Pulse: 62   SpO2: 99%   Weight: 84.4 kg  "(186 lb)   Height: 1.753 m (5' 9\")       Last Labs:  CBC - 10/28/2024:  4:57 AM  8.8 13.3 321    39.5      CMP - 10/28/2024:  4:57 AM  8.8 6.1 16 --- 0.9   _ 4.0 24 104      PTT - No results in last year.  _   _ _     TROPHS   Date/Time Value Ref Range Status   10/26/2024 10:18 AM 56 0 - 20 ng/L Final   10/24/2024 08:38 PM 57 0 - 20 ng/L Final     Comment:     Previous result verified on 10/24/2024 2008 on specimen/case [id number] called with component [facility] for procedure Troponin I, High Sensitivity, Initial with value 66 ng/L.   10/24/2024 07:30 PM 66 0 - 20 ng/L Final     HGBA1C   Date/Time Value Ref Range Status   10/24/2024 07:30 PM 7.1 See comment % Final   07/27/2023 09:36 AM 6.4 % Final     Comment:          Diagnosis of Diabetes-Adults   Non-Diabetic: < or = 5.6%   Increased risk for developing diabetes: 5.7-6.4%   Diagnostic of diabetes: > or = 6.5%  .       Monitoring of Diabetes                Age (y)     Therapeutic Goal (%)   Adults:          >18           <7.0   Pediatrics:    13-18           <7.5                   7-12           <8.0                   0- 6            7.5-8.5   American Diabetes Association. Diabetes Care 33(S1), Jan 2010.   03/13/2023 10:40 AM 6.4 % Final     Comment:          Diagnosis of Diabetes-Adults   Non-Diabetic: < or = 5.6%   Increased risk for developing diabetes: 5.7-6.4%   Diagnostic of diabetes: > or = 6.5%  .       Monitoring of Diabetes                Age (y)     Therapeutic Goal (%)   Adults:          >18           <7.0   Pediatrics:    13-18           <7.5                   7-12           <8.0                   0- 6            7.5-8.5   American Diabetes Association. Diabetes Care 33(S1), Jan 2010.       LDLCALC   Date/Time Value Ref Range Status   10/24/2024 07:30 PM 38 <=99 mg/dL Final     Comment:                                 Near   Borderline      AGE      Desirable  Optimal    High     High     Very High     0-19 Y     0 - 109     ---    110-129   >/= " 130     ----    20-24 Y     0 - 119     ---    120-159   >/= 160     ----      >24 Y     0 -  99   100-129  130-159   160-189     >/=190       VLDL   Date/Time Value Ref Range Status   10/24/2024 07:30 PM 22 0 - 40 mg/dL Final   03/13/2023 10:40 AM 18 0 - 40 mg/dL Final   04/22/2021 10:10 AM 12 0 - 40 mg/dL Final   02/20/2020 08:55 AM 19 0 - 40 mg/dL Final        Patient Medications:  Outpatient Encounter Medications as of 11/19/2024   Medication Sig Dispense Refill    ARIPiprazole (Abilify) 20 mg tablet Take 1 tablet (20 mg) by mouth once daily. 90 tablet 1    atorvastatin (Lipitor) 40 mg tablet Take 1 tablet (40 mg) by mouth once daily at bedtime. 90 tablet 3    clopidogrel (Plavix) 75 mg tablet Take 1 tablet (75 mg) by mouth once daily. 90 tablet 3    FLUoxetine (PROzac) 20 mg capsule Take 1 capsule (20 mg) by mouth once daily. 90 capsule 1    levothyroxine (Synthroid, Levoxyl) 25 mcg tablet Take 1 tablet (25 mcg) by mouth once daily. 90 tablet 3    metFORMIN (Glucophage) 1,000 mg tablet Take 1 tablet (1,000 mg) by mouth every 12 hours. 180 tablet 0    nitroglycerin (Nitrostat) 0.4 mg SL tablet Place 1 tablet (0.4 mg) under the tongue every 5 minutes if needed for chest pain. 25 tablet 0    OneTouch Ultra Test strip       ergocalciferol (Vitamin D-2) 1.25 MG (12167 UT) capsule Take 1 capsule (1,250 mcg) by mouth 1 (one) time per week. (Patient not taking: Reported on 11/19/2024) 13 capsule 3     No facility-administered encounter medications on file as of 11/19/2024.       Physical Exam:  General: alert, oriented and in no acute distress  HEENT: NC/AT; EOMI; PERRLA, external ear is normal  Neck: supple; trachea midline; no masses; no JVD  Chest: clear breath sounds bilaterally; no wheezing  Cardio: regular rhythm, S1S2 normal, no murmurs  Abdomen: Soft, non-tender, non-distension, no organomegaly  Extremities: no clubbing/cyanosis/edema  Neuro: Grossly intact     Psychiatric: Normal mood and affect     Past  Cardiology Results (Last 3 Years):  EKG:  ECG 12 lead (Clinic Performed) 11/19/2024      ECG 12 lead 10/25/2024      ECG 12 Lead 10/24/2024    Echo:  Echo Results:  Transthoracic Echo (TTE) Complete With Contrast 10/25/2024    [place] [id number]  Phone [phone], Fax [phone]    TRANSTHORACIC ECHOCARDIOGRAM REPORT    Patient Name:      DIAMOND HUGGINS RADHA    Reading Physician:    [id number] Jonathan Geiger MD  Study Date:        10/25/2024           Ordering Provider:    [id number] ELSI TODDON  MRN/PID:           [id number]             Fellow:  Accession#:        [id number]         Nurse:                Sandra Drew RN  Date of Birth/Age: 1960 / 63      Sonographer:          Marcela Gordillo RVT,  years                                      RCS  Gender:            M                    Additional Staff:  Height:            175.26 cm            Admit Date:           10/24/2024  Weight:            40.82 kg             Admission Status:     Inpatient -  Routine  BSA / BMI:         1.47 m2 / 13.29      Department Location:  [address]  kg/m2  Blood Pressure: 126 /57 mmHg    Study Type:    TRANSTHORACIC ECHO (TTE) COMPLETE  Diagnosis/ICD: Syncope-R55  Indication:    Syncope  CPT Codes:     Echo Complete w Full [id number]    Patient History:  Pertinent History: No previous echo.    Study Detail: The following Echo studies were performed: 2D, M-Mode, Doppler and  color flow. Definity used as a contrast agent for endocardial  border definition and agitated saline used as a contrast agent for  intraseptal flow evaluation. Total contrast used for this  procedure was 2 mL via IV push. The patient was awake.      PHYSICIAN INTERPRETATION:  Left Ventricle: Left ventricular ejection fraction is normal, by visual estimate at 55-60%. There are no regional wall motion abnormalities. The left ventricular cavity size is normal. Spectral Doppler shows a normal  pattern of left ventricular diastolic filling.  Left Atrium: The left atrium is normal in size. A bubble study using agitated saline was performed. Bubble study is negative.  Right Ventricle: The right ventricle is normal in size. There is normal right ventricular global systolic function.  Right Atrium: The right atrium is normal in size.  Aortic Valve: The aortic valve was not well visualized. The aortic valve dimensionless index is 0.60. There is no evidence of aortic valve regurgitation. The peak instantaneous gradient of the aortic valve is 12.7 mmHg. The mean gradient of the aortic valve is 5.0 mmHg.  Mitral Valve: The mitral valve is normal in structure. There is no evidence of mitral valve regurgitation.  Tricuspid Valve: The tricuspid valve is structurally normal. No evidence of tricuspid regurgitation.  Pulmonic Valve: The pulmonic valve is not well visualized. There is no indication of pulmonic valve regurgitation.  Pericardium: No pericardial effusion noted.  Aorta: The aortic root is normal.  Systemic Veins: The inferior vena cava appears normal in size, with IVC inspiratory collapse greater than 50%.      CONCLUSIONS:  1. Left ventricular ejection fraction is normal, by visual estimate at 55-60%.  2. There is normal right ventricular global systolic function.    QUANTITATIVE DATA SUMMARY:    2D MEASUREMENTS:         Normal Ranges:  Ao Root d:       2.60 cm (2.0-3.7cm)      LA VOLUME:                    Normal Ranges:  LA Vol A4C:        26.5 ml    (22+/-6mL/m2)  LA Vol A2C:        39.7 ml  LA Vol BP:         32.5 ml  LA Vol Index A4C:  18.0ml/m2  LA Vol Index A2C:  27.0 ml/m2  LA Vol Index BP:   22.1 ml/m2  LA Area A4C:       12.7 cm2  LA Area A2C:       15.5 cm2  LA Major Axis A4C: 5.2 cm  LA Major Axis A2C: 5.2 cm  LA Volume Index:   26.4 ml/m2  LA Vol A4C:        26.1 ml  LA Vol A2C:        38.8 ml  LA Vol Index BSA:  22.1 ml/m2      M-MODE MEASUREMENTS:         Normal Ranges:  AoV Exc:              2.00 cm (1.5-2.5cm)      AORTA MEASUREMENTS:         Normal Ranges:  AoV Exc:            2.00 cm (1.5-2.5cm)      LV SYSTOLIC FUNCTION BY 2D PLANIMETRY (MOD):  Normal Ranges:  EF-A4C View:    58 % (>=55%)  EF-A2C View:    46 %  EF-Biplane:     53 %  EF-Visual:      58 %  LV EF Reported: 58 %      LV DIASTOLIC FUNCTION:           Normal Ranges:  MV Peak E:             0.99 m/s  (0.7-1.2 m/s)  MV Peak A:             1.15 m/s  (0.42-0.7 m/s)  E/A Ratio:             0.86      (1.0-2.2)  MV e'                  0.112 m/s (>8.0)  MV lateral e'          0.12 m/s  MV medial e'           0.11 m/s  E/e' Ratio:            8.84      (<8.0)      MITRAL VALVE:          Normal Ranges:  MV DT:        225 msec (150-240msec)      AORTIC VALVE:                      Normal Ranges:  AoV Vmax:                1.78 m/s  (<=1.7m/s)  AoV Peak P.7 mmHg (<20mmHg)  AoV Mean P.0 mmHg  (1.7-11.5mmHg)  LVOT Max Faustino:            1.08 m/s  (<=1.1m/s)  AoV VTI:                 39.60 cm  (18-25cm)  LVOT VTI:                23.80 cm  LVOT Diameter:           2.00 cm   (1.8-2.4cm)  AoV Area, VTI:           1.89 cm2  (2.5-5.5cm2)  AoV Area,Vmax:           1.91 cm2  (2.5-4.5cm2)  AoV Dimensionless Index: 0.60      RIGHT VENTRICLE:  RV Basal 3.19 cm  RV Mid   2.05 cm  RV Major 5.0 cm  TAPSE:   24.8 mm      TRICUSPID VALVE/RVSP:          Normal Ranges:  Peak TR Velocity:     2.38 m/s  RV Syst Pressure:     26 mmHg  (< 30mmHg)      PULMONIC VALVE:          Normal Ranges:  PV Accel Time:  134 msec (>120ms)  PV Max Faustino:     1.1 m/s  (0.6-0.9m/s)  PV Max P.1 mmHg      [id number] Jonathan Geiger MD  Electronically signed on 10/25/2024 at 9:21:42 AM        ** Final **     Cath:  No results found for this or any previous visit from the past 1095 days.    CV NCDR CATHPCI V5 COLLECTION FORM   Stress Test:  No results found for this or any previous visit from the past 1095 days.    Cardiac Imaging:  No results found for this or any  previous visit from the past 1095 days.       Assessment/Plan     Mr. Naveed Brown is a 64 y.o. year old former smoker diabetic male patient with past medical history significant for syncope, coronary artery disease status (CAD) S/P stent (around 6 years ago),  post PCI, hypertension, diabetes, hyperlipidemia, hypothyroidism, GERD, bipolar disorder, depression, anxiety, BPH, coming for establishment of care. He endorsed a recent episode of dizziness and pre-syncopal episode while cooking. He endorses that he did not completely passed out and he remember the event. Patient not compliant with his medication. A1C 7.1%. Trop 57-66. Patient has been admitted for clinical compensation. He has been discharged uneventfully.     Assessment     # Syncope / CAD S/P Stent (6 years ago)  - Trop 57 - 66.  - He endorsed a recent episode of dizziness and pre-syncopal episode while cooking. He endorses that he did not completely passed out and he remember the event. Patient not compliant with his medication.   - EKG showed bradycardic sinus rhythm with non-specific ST-T changes.   - Echocardiogram (10/25/2024) showed normal LVEF 55-60% with no wall motion abnormalities.   - US duplex carotids:  Right Carotid: Findings are consistent with less than 50% stenosis of the right proximal internal carotid artery. Laminar flow seen by color Doppler. Right external carotid artery appears patent with no evidence of stenosis. No evidence of hemodynamically significant stenosis of the right common carotid artery. The right vertebral artery is patent with antegrade flow. No evidence of hemodynamically significant stenosis in the right subclavian artery.  Left Carotid: Findings are consistent with less than 50% stenosis of the left proximal internal carotid artery. Laminar flow seen by color Doppler. Left external carotid artery appears patent with no evidence of stenosis. No evidence of hemodynamically significant stenosis of the left  common carotid artery. The left vertebral artery is patent with antegrade flow. There are elevated velocities in the left subclavian artery that are suggestive of disease.  -7-day holter monitor:  *The predominant rhythm was sinus.  *The Maximum Heart Rate recorded was 170 bpm, 11/02 14:17:59, the Minimum Heart Rate recorded was 42bpm, 11/03 00:58:21, and the Average Heart Rate was 61 bpm.  *There were 131 VE beats with a burden of <1 %.  *There were 1,331 SVE beats with a burden of <1 %. There were 12 occurrences of Supraventricular Tachycardia with the Fastest episode 170 bpm, 11/02 14:17:58, and the Longest episode 15 beats, 10/29 05:52:33.  *Other rhythms in this study include: Ventricular Run.  *There was 1 Patient Trigger.  - Would suggest thyroid function assessment by PCP team.  - Would suggest ischemic assessment with Left Heart Catheterization due to prior stent history and elevated troponin.  - The natural history of atherosclerosis was discussed with the patient. The treatment options including GDMT, percutaneous intervention or surgical intervention were discussed with the patient. All the risks, benefits and alternative procedures were discussed. Complications of the procedure were also discussed, including but not limited to risk of bleeding, stroke, infarct, infection, urgent cardiac surgery or death. All questions were answered and the informed consent was obtained. After aforementioned testing and consultation, Left Heart Catheterization with potential Percutaneous Coronary Intervention would be a reasonable approach if the patient is candidate.  - Would suggest to keep Clopidogrel, high intensity statins - Atorvastatin 40mg daily.  - Follow up after tests.     # Diabetes  - Counseled on healthy diet and regular exercises.  - Discussed need for weight loss and the benefits.   - Keep current medications including Metformin.     # Hyperlipidemia  - Controlled by PCP.  - Keep home medication with  Atorvastatin 40mg daily.  - Counseled on healthy diet and regular exercise.     # Hypothyroidism  - Would suggest  - Keep Levothyroxine daily     # Nicotine dependence  - We had a thorough conversation with the patient (~3min) addressing the hazard risks for smoking, including but not limited to heart attack, stroke and cancer. He has stopped smoking one year ago.    We have discussed the most common side effects of the prescribed medications, indications, drug interactions, risks, complications, and alternatives of medications/therapeutics were explained and discussed. The patient has been requested to monitor closely for any untoward side effects or complications of medications. The patient has been strongly advised to be compliant with the recommendations, all the questions and concerns have been addressed. The patient has been also instructed to call, to return sooner or to go to the emergency department if symptoms persist or get worsen. The patient voiced understanding and denies any further questions at this time.      This note was transcribed using the Dragon Dictation system. There may be grammatical, punctuation, or verbiage errors that occur with voice recognition programs.    Counseling greater than 50% of visit regarding all cardiac issues.    Thank you, Dr. Rm, for allowing me to participate in the care of this patient. Please do not hesitate to contact me with any further questions or concerns.    Jerald Hill MD  Cardiology

## 2024-11-19 NOTE — H&P (VIEW-ONLY)
Chief Complaint   Patient presents with    Hospital Follow-up     HPI:  I was requested by Dr. Rm to evaluate this patient in consultation for cardiac assessment.    Mr. Naveed Brown is a 64 y.o. year old former smoker diabetic male patient with past medical history significant for syncope, coronary artery disease status (CAD) S/P stent (around 6 years ago),  post PCI, hypertension, diabetes, hyperlipidemia, hypothyroidism, GERD, bipolar disorder, depression, anxiety, BPH, coming for establishment of care. He endorsed a recent episode of dizziness and pre-syncopal episode while cooking. He endorses that he did not completely passed out and he remember the event. Patient not compliant with his medication. A1C 7.1%. Trop 57-66. Patient has been admitted for clinical compensation. He has been discharged uneventfully.   EKG showed bradycardic sinus rhythm with non-specific ST-T changes.     Past Medical History  Past Medical History:   Diagnosis Date    Diabetes mellitus (Multi)     Dysphagia 01/30/2023       Past Surgical History  Past Surgical History:   Procedure Laterality Date    CARDIAC SURGERY      COLONOSCOPY  03/12/2020    REPEAT 3  YEARS. FINAL DIAGNOSIS A.  DECSENDING COLON POLYP, POLYPECTOMY: --TUBULAR ADENOMA.  B.  RECTAL BIOPSY: --COLONIC MUCOSA WITH PROMINENT LYMPHOID AGGREGATES.  REPEAT 3 YEARS    FINGER SURGERY      RIGHT THUMB SURGERY    OTHER SURGICAL HISTORY  07/22/2020    Bone transplantation    OTHER SURGICAL HISTORY  02/10/2020    Surgery    OTHER SURGICAL HISTORY  07/22/2020    Cardiac catheterization    SKIN GRAFT         Past Family History  Family History   Problem Relation Name Age of Onset    Brain cancer Mother      Diabetes type II Father      Alzheimer's disease Father      Alzheimer's disease Sister      Dementia Sister      Colon cancer Brother         Allergy History  No Known Allergies    Past Social History  Social History     Socioeconomic History    Marital status: Single    Occupational History    Occupation:    Tobacco Use    Smoking status: Former     Current packs/day: 1.00     Average packs/day: 1 pack/day for 40.0 years (40.0 ttl pk-yrs)     Types: Cigarettes    Smokeless tobacco: Never   Vaping Use    Vaping status: Never Used   Substance and Sexual Activity    Alcohol use: Never    Drug use: Never     Social Drivers of Health     Financial Resource Strain: Low Risk  (10/28/2024)    Overall Financial Resource Strain (CARDIA)     Difficulty of Paying Living Expenses: Not very hard   Food Insecurity: Patient Declined (10/24/2024)    Hunger Vital Sign     Worried About Running Out of Food in the Last Year: Patient declined     Ran Out of Food in the Last Year: Patient declined   Transportation Needs: No Transportation Needs (10/28/2024)    PRAPARE - Transportation     Lack of Transportation (Medical): No     Lack of Transportation (Non-Medical): No   Physical Activity: Inactive (10/24/2024)    Exercise Vital Sign     Days of Exercise per Week: 0 days     Minutes of Exercise per Session: 0 min   Intimate Partner Violence: Not At Risk (10/24/2024)    Humiliation, Afraid, Rape, and Kick questionnaire     Fear of Current or Ex-Partner: No     Emotionally Abused: No     Physically Abused: No     Sexually Abused: No   Housing Stability: Low Risk  (10/28/2024)    Housing Stability Vital Sign     Unable to Pay for Housing in the Last Year: No     Number of Times Moved in the Last Year: 0     Homeless in the Last Year: No       Social History     Tobacco Use   Smoking Status Former    Current packs/day: 1.00    Average packs/day: 1 pack/day for 40.0 years (40.0 ttl pk-yrs)    Types: Cigarettes   Smokeless Tobacco Never       Review of Systems:  A total of 12 systems have been reviewed and are negative except for the aforementioned findings described in HPI.     Objective Data:  Last Recorded Vitals:  Vitals:    11/19/24 1409   BP: 120/68   Pulse: 62   SpO2: 99%   Weight: 84.4 kg  "(186 lb)   Height: 1.753 m (5' 9\")       Last Labs:  CBC - 10/28/2024:  4:57 AM  8.8 13.3 321    39.5      CMP - 10/28/2024:  4:57 AM  8.8 6.1 16 --- 0.9   _ 4.0 24 104      PTT - No results in last year.  _   _ _     TROPHS   Date/Time Value Ref Range Status   10/26/2024 10:18 AM 56 0 - 20 ng/L Final   10/24/2024 08:38 PM 57 0 - 20 ng/L Final     Comment:     Previous result verified on 10/24/2024 2008 on specimen/case [id number] called with component [facility] for procedure Troponin I, High Sensitivity, Initial with value 66 ng/L.   10/24/2024 07:30 PM 66 0 - 20 ng/L Final     HGBA1C   Date/Time Value Ref Range Status   10/24/2024 07:30 PM 7.1 See comment % Final   07/27/2023 09:36 AM 6.4 % Final     Comment:          Diagnosis of Diabetes-Adults   Non-Diabetic: < or = 5.6%   Increased risk for developing diabetes: 5.7-6.4%   Diagnostic of diabetes: > or = 6.5%  .       Monitoring of Diabetes                Age (y)     Therapeutic Goal (%)   Adults:          >18           <7.0   Pediatrics:    13-18           <7.5                   7-12           <8.0                   0- 6            7.5-8.5   American Diabetes Association. Diabetes Care 33(S1), Jan 2010.   03/13/2023 10:40 AM 6.4 % Final     Comment:          Diagnosis of Diabetes-Adults   Non-Diabetic: < or = 5.6%   Increased risk for developing diabetes: 5.7-6.4%   Diagnostic of diabetes: > or = 6.5%  .       Monitoring of Diabetes                Age (y)     Therapeutic Goal (%)   Adults:          >18           <7.0   Pediatrics:    13-18           <7.5                   7-12           <8.0                   0- 6            7.5-8.5   American Diabetes Association. Diabetes Care 33(S1), Jan 2010.       LDLCALC   Date/Time Value Ref Range Status   10/24/2024 07:30 PM 38 <=99 mg/dL Final     Comment:                                 Near   Borderline      AGE      Desirable  Optimal    High     High     Very High     0-19 Y     0 - 109     ---    110-129   >/= " 130     ----    20-24 Y     0 - 119     ---    120-159   >/= 160     ----      >24 Y     0 -  99   100-129  130-159   160-189     >/=190       VLDL   Date/Time Value Ref Range Status   10/24/2024 07:30 PM 22 0 - 40 mg/dL Final   03/13/2023 10:40 AM 18 0 - 40 mg/dL Final   04/22/2021 10:10 AM 12 0 - 40 mg/dL Final   02/20/2020 08:55 AM 19 0 - 40 mg/dL Final        Patient Medications:  Outpatient Encounter Medications as of 11/19/2024   Medication Sig Dispense Refill    ARIPiprazole (Abilify) 20 mg tablet Take 1 tablet (20 mg) by mouth once daily. 90 tablet 1    atorvastatin (Lipitor) 40 mg tablet Take 1 tablet (40 mg) by mouth once daily at bedtime. 90 tablet 3    clopidogrel (Plavix) 75 mg tablet Take 1 tablet (75 mg) by mouth once daily. 90 tablet 3    FLUoxetine (PROzac) 20 mg capsule Take 1 capsule (20 mg) by mouth once daily. 90 capsule 1    levothyroxine (Synthroid, Levoxyl) 25 mcg tablet Take 1 tablet (25 mcg) by mouth once daily. 90 tablet 3    metFORMIN (Glucophage) 1,000 mg tablet Take 1 tablet (1,000 mg) by mouth every 12 hours. 180 tablet 0    nitroglycerin (Nitrostat) 0.4 mg SL tablet Place 1 tablet (0.4 mg) under the tongue every 5 minutes if needed for chest pain. 25 tablet 0    OneTouch Ultra Test strip       ergocalciferol (Vitamin D-2) 1.25 MG (44568 UT) capsule Take 1 capsule (1,250 mcg) by mouth 1 (one) time per week. (Patient not taking: Reported on 11/19/2024) 13 capsule 3     No facility-administered encounter medications on file as of 11/19/2024.       Physical Exam:  General: alert, oriented and in no acute distress  HEENT: NC/AT; EOMI; PERRLA, external ear is normal  Neck: supple; trachea midline; no masses; no JVD  Chest: clear breath sounds bilaterally; no wheezing  Cardio: regular rhythm, S1S2 normal, no murmurs  Abdomen: Soft, non-tender, non-distension, no organomegaly  Extremities: no clubbing/cyanosis/edema  Neuro: Grossly intact     Psychiatric: Normal mood and affect     Past  Cardiology Results (Last 3 Years):  EKG:  ECG 12 lead (Clinic Performed) 11/19/2024      ECG 12 lead 10/25/2024      ECG 12 Lead 10/24/2024    Echo:  Echo Results:  Transthoracic Echo (TTE) Complete With Contrast 10/25/2024    [place] [id number]  Phone [phone], Fax [phone]    TRANSTHORACIC ECHOCARDIOGRAM REPORT    Patient Name:      DIAMOND HUGGINS RADHA    Reading Physician:    [id number] Jonathan Geiger MD  Study Date:        10/25/2024           Ordering Provider:    [id number] ELSI TODDON  MRN/PID:           [id number]             Fellow:  Accession#:        [id number]         Nurse:                Sandra Drew RN  Date of Birth/Age: 1960 / 63      Sonographer:          Marcela Gordillo RVT,  years                                      RCS  Gender:            M                    Additional Staff:  Height:            175.26 cm            Admit Date:           10/24/2024  Weight:            40.82 kg             Admission Status:     Inpatient -  Routine  BSA / BMI:         1.47 m2 / 13.29      Department Location:  [address]  kg/m2  Blood Pressure: 126 /57 mmHg    Study Type:    TRANSTHORACIC ECHO (TTE) COMPLETE  Diagnosis/ICD: Syncope-R55  Indication:    Syncope  CPT Codes:     Echo Complete w Full [id number]    Patient History:  Pertinent History: No previous echo.    Study Detail: The following Echo studies were performed: 2D, M-Mode, Doppler and  color flow. Definity used as a contrast agent for endocardial  border definition and agitated saline used as a contrast agent for  intraseptal flow evaluation. Total contrast used for this  procedure was 2 mL via IV push. The patient was awake.      PHYSICIAN INTERPRETATION:  Left Ventricle: Left ventricular ejection fraction is normal, by visual estimate at 55-60%. There are no regional wall motion abnormalities. The left ventricular cavity size is normal. Spectral Doppler shows a normal  pattern of left ventricular diastolic filling.  Left Atrium: The left atrium is normal in size. A bubble study using agitated saline was performed. Bubble study is negative.  Right Ventricle: The right ventricle is normal in size. There is normal right ventricular global systolic function.  Right Atrium: The right atrium is normal in size.  Aortic Valve: The aortic valve was not well visualized. The aortic valve dimensionless index is 0.60. There is no evidence of aortic valve regurgitation. The peak instantaneous gradient of the aortic valve is 12.7 mmHg. The mean gradient of the aortic valve is 5.0 mmHg.  Mitral Valve: The mitral valve is normal in structure. There is no evidence of mitral valve regurgitation.  Tricuspid Valve: The tricuspid valve is structurally normal. No evidence of tricuspid regurgitation.  Pulmonic Valve: The pulmonic valve is not well visualized. There is no indication of pulmonic valve regurgitation.  Pericardium: No pericardial effusion noted.  Aorta: The aortic root is normal.  Systemic Veins: The inferior vena cava appears normal in size, with IVC inspiratory collapse greater than 50%.      CONCLUSIONS:  1. Left ventricular ejection fraction is normal, by visual estimate at 55-60%.  2. There is normal right ventricular global systolic function.    QUANTITATIVE DATA SUMMARY:    2D MEASUREMENTS:         Normal Ranges:  Ao Root d:       2.60 cm (2.0-3.7cm)      LA VOLUME:                    Normal Ranges:  LA Vol A4C:        26.5 ml    (22+/-6mL/m2)  LA Vol A2C:        39.7 ml  LA Vol BP:         32.5 ml  LA Vol Index A4C:  18.0ml/m2  LA Vol Index A2C:  27.0 ml/m2  LA Vol Index BP:   22.1 ml/m2  LA Area A4C:       12.7 cm2  LA Area A2C:       15.5 cm2  LA Major Axis A4C: 5.2 cm  LA Major Axis A2C: 5.2 cm  LA Volume Index:   26.4 ml/m2  LA Vol A4C:        26.1 ml  LA Vol A2C:        38.8 ml  LA Vol Index BSA:  22.1 ml/m2      M-MODE MEASUREMENTS:         Normal Ranges:  AoV Exc:              2.00 cm (1.5-2.5cm)      AORTA MEASUREMENTS:         Normal Ranges:  AoV Exc:            2.00 cm (1.5-2.5cm)      LV SYSTOLIC FUNCTION BY 2D PLANIMETRY (MOD):  Normal Ranges:  EF-A4C View:    58 % (>=55%)  EF-A2C View:    46 %  EF-Biplane:     53 %  EF-Visual:      58 %  LV EF Reported: 58 %      LV DIASTOLIC FUNCTION:           Normal Ranges:  MV Peak E:             0.99 m/s  (0.7-1.2 m/s)  MV Peak A:             1.15 m/s  (0.42-0.7 m/s)  E/A Ratio:             0.86      (1.0-2.2)  MV e'                  0.112 m/s (>8.0)  MV lateral e'          0.12 m/s  MV medial e'           0.11 m/s  E/e' Ratio:            8.84      (<8.0)      MITRAL VALVE:          Normal Ranges:  MV DT:        225 msec (150-240msec)      AORTIC VALVE:                      Normal Ranges:  AoV Vmax:                1.78 m/s  (<=1.7m/s)  AoV Peak P.7 mmHg (<20mmHg)  AoV Mean P.0 mmHg  (1.7-11.5mmHg)  LVOT Max Faustino:            1.08 m/s  (<=1.1m/s)  AoV VTI:                 39.60 cm  (18-25cm)  LVOT VTI:                23.80 cm  LVOT Diameter:           2.00 cm   (1.8-2.4cm)  AoV Area, VTI:           1.89 cm2  (2.5-5.5cm2)  AoV Area,Vmax:           1.91 cm2  (2.5-4.5cm2)  AoV Dimensionless Index: 0.60      RIGHT VENTRICLE:  RV Basal 3.19 cm  RV Mid   2.05 cm  RV Major 5.0 cm  TAPSE:   24.8 mm      TRICUSPID VALVE/RVSP:          Normal Ranges:  Peak TR Velocity:     2.38 m/s  RV Syst Pressure:     26 mmHg  (< 30mmHg)      PULMONIC VALVE:          Normal Ranges:  PV Accel Time:  134 msec (>120ms)  PV Max Faustino:     1.1 m/s  (0.6-0.9m/s)  PV Max P.1 mmHg      [id number] Jonathan Geiger MD  Electronically signed on 10/25/2024 at 9:21:42 AM        ** Final **     Cath:  No results found for this or any previous visit from the past 1095 days.    CV NCDR CATHPCI V5 COLLECTION FORM   Stress Test:  No results found for this or any previous visit from the past 1095 days.    Cardiac Imaging:  No results found for this or any  previous visit from the past 1095 days.       Assessment/Plan     Mr. Naveed Brown is a 64 y.o. year old former smoker diabetic male patient with past medical history significant for syncope, coronary artery disease status (CAD) S/P stent (around 6 years ago),  post PCI, hypertension, diabetes, hyperlipidemia, hypothyroidism, GERD, bipolar disorder, depression, anxiety, BPH, coming for establishment of care. He endorsed a recent episode of dizziness and pre-syncopal episode while cooking. He endorses that he did not completely passed out and he remember the event. Patient not compliant with his medication. A1C 7.1%. Trop 57-66. Patient has been admitted for clinical compensation. He has been discharged uneventfully.     Assessment     # Syncope / CAD S/P Stent (6 years ago)  - Trop 57 - 66.  - He endorsed a recent episode of dizziness and pre-syncopal episode while cooking. He endorses that he did not completely passed out and he remember the event. Patient not compliant with his medication.   - EKG showed bradycardic sinus rhythm with non-specific ST-T changes.   - Echocardiogram (10/25/2024) showed normal LVEF 55-60% with no wall motion abnormalities.   - US duplex carotids:  Right Carotid: Findings are consistent with less than 50% stenosis of the right proximal internal carotid artery. Laminar flow seen by color Doppler. Right external carotid artery appears patent with no evidence of stenosis. No evidence of hemodynamically significant stenosis of the right common carotid artery. The right vertebral artery is patent with antegrade flow. No evidence of hemodynamically significant stenosis in the right subclavian artery.  Left Carotid: Findings are consistent with less than 50% stenosis of the left proximal internal carotid artery. Laminar flow seen by color Doppler. Left external carotid artery appears patent with no evidence of stenosis. No evidence of hemodynamically significant stenosis of the left  common carotid artery. The left vertebral artery is patent with antegrade flow. There are elevated velocities in the left subclavian artery that are suggestive of disease.  -7-day holter monitor:  *The predominant rhythm was sinus.  *The Maximum Heart Rate recorded was 170 bpm, 11/02 14:17:59, the Minimum Heart Rate recorded was 42bpm, 11/03 00:58:21, and the Average Heart Rate was 61 bpm.  *There were 131 VE beats with a burden of <1 %.  *There were 1,331 SVE beats with a burden of <1 %. There were 12 occurrences of Supraventricular Tachycardia with the Fastest episode 170 bpm, 11/02 14:17:58, and the Longest episode 15 beats, 10/29 05:52:33.  *Other rhythms in this study include: Ventricular Run.  *There was 1 Patient Trigger.  - Would suggest thyroid function assessment by PCP team.  - Would suggest ischemic assessment with Left Heart Catheterization due to prior stent history and elevated troponin.  - The natural history of atherosclerosis was discussed with the patient. The treatment options including GDMT, percutaneous intervention or surgical intervention were discussed with the patient. All the risks, benefits and alternative procedures were discussed. Complications of the procedure were also discussed, including but not limited to risk of bleeding, stroke, infarct, infection, urgent cardiac surgery or death. All questions were answered and the informed consent was obtained. After aforementioned testing and consultation, Left Heart Catheterization with potential Percutaneous Coronary Intervention would be a reasonable approach if the patient is candidate.  - Would suggest to keep Clopidogrel, high intensity statins - Atorvastatin 40mg daily.  - Follow up after tests.     # Diabetes  - Counseled on healthy diet and regular exercises.  - Discussed need for weight loss and the benefits.   - Keep current medications including Metformin.     # Hyperlipidemia  - Controlled by PCP.  - Keep home medication with  Atorvastatin 40mg daily.  - Counseled on healthy diet and regular exercise.     # Hypothyroidism  - Would suggest  - Keep Levothyroxine daily     # Nicotine dependence  - We had a thorough conversation with the patient (~3min) addressing the hazard risks for smoking, including but not limited to heart attack, stroke and cancer. He has stopped smoking one year ago.    We have discussed the most common side effects of the prescribed medications, indications, drug interactions, risks, complications, and alternatives of medications/therapeutics were explained and discussed. The patient has been requested to monitor closely for any untoward side effects or complications of medications. The patient has been strongly advised to be compliant with the recommendations, all the questions and concerns have been addressed. The patient has been also instructed to call, to return sooner or to go to the emergency department if symptoms persist or get worsen. The patient voiced understanding and denies any further questions at this time.      This note was transcribed using the Dragon Dictation system. There may be grammatical, punctuation, or verbiage errors that occur with voice recognition programs.    Counseling greater than 50% of visit regarding all cardiac issues.    Thank you, Dr. Rm, for allowing me to participate in the care of this patient. Please do not hesitate to contact me with any further questions or concerns.    Jerald Hill MD  Cardiology

## 2024-11-20 ENCOUNTER — LAB (OUTPATIENT)
Dept: LAB | Facility: LAB | Age: 64
End: 2024-11-20
Payer: MEDICAID

## 2024-11-20 DIAGNOSIS — E11.69 MIXED HYPERLIPIDEMIA DUE TO TYPE 2 DIABETES MELLITUS (MULTI): ICD-10-CM

## 2024-11-20 DIAGNOSIS — E53.8 VITAMIN B12 DEFICIENCY: ICD-10-CM

## 2024-11-20 DIAGNOSIS — Z12.5 SCREENING PSA (PROSTATE SPECIFIC ANTIGEN): ICD-10-CM

## 2024-11-20 DIAGNOSIS — E61.1 LOW IRON: ICD-10-CM

## 2024-11-20 DIAGNOSIS — E55.9 VITAMIN D DEFICIENCY: ICD-10-CM

## 2024-11-20 DIAGNOSIS — E78.2 MIXED HYPERLIPIDEMIA DUE TO TYPE 2 DIABETES MELLITUS (MULTI): ICD-10-CM

## 2024-11-20 LAB
25(OH)D3 SERPL-MCNC: 23 NG/ML (ref 30–100)
ALBUMIN SERPL BCP-MCNC: 4.2 G/DL (ref 3.4–5)
ALP SERPL-CCNC: 96 U/L (ref 33–136)
ALT SERPL W P-5'-P-CCNC: 24 U/L (ref 10–52)
ANION GAP SERPL CALC-SCNC: 10 MMOL/L (ref 10–20)
AST SERPL W P-5'-P-CCNC: 17 U/L (ref 9–39)
BASOPHILS # BLD AUTO: 0.04 X10*3/UL (ref 0–0.1)
BASOPHILS NFR BLD AUTO: 0.6 %
BILIRUB SERPL-MCNC: 0.7 MG/DL (ref 0–1.2)
BUN SERPL-MCNC: 16 MG/DL (ref 6–23)
CALCIUM SERPL-MCNC: 9 MG/DL (ref 8.6–10.3)
CHLORIDE SERPL-SCNC: 105 MMOL/L (ref 98–107)
CHOLEST SERPL-MCNC: 125 MG/DL (ref 0–199)
CHOLESTEROL/HDL RATIO: 2.3
CO2 SERPL-SCNC: 31 MMOL/L (ref 21–32)
CREAT SERPL-MCNC: 0.75 MG/DL (ref 0.5–1.3)
EGFRCR SERPLBLD CKD-EPI 2021: >90 ML/MIN/1.73M*2
EOSINOPHIL # BLD AUTO: 0.03 X10*3/UL (ref 0–0.7)
EOSINOPHIL NFR BLD AUTO: 0.4 %
ERYTHROCYTE [DISTWIDTH] IN BLOOD BY AUTOMATED COUNT: 12.8 % (ref 11.5–14.5)
EST. AVERAGE GLUCOSE BLD GHB EST-MCNC: 160 MG/DL
FERRITIN SERPL-MCNC: 170 NG/ML (ref 20–300)
FOLATE SERPL-MCNC: 11.4 NG/ML
GLUCOSE SERPL-MCNC: 169 MG/DL (ref 74–99)
HBA1C MFR BLD: 7.2 %
HCT VFR BLD AUTO: 44.9 % (ref 41–52)
HDLC SERPL-MCNC: 54 MG/DL
HGB BLD-MCNC: 14.8 G/DL (ref 13.5–17.5)
IMM GRANULOCYTES # BLD AUTO: 0.01 X10*3/UL (ref 0–0.7)
IMM GRANULOCYTES NFR BLD AUTO: 0.1 % (ref 0–0.9)
IRON SATN MFR SERPL: 32 % (ref 25–45)
IRON SERPL-MCNC: 101 UG/DL (ref 35–150)
LDLC SERPL CALC-MCNC: 56 MG/DL
LYMPHOCYTES # BLD AUTO: 1.93 X10*3/UL (ref 1.2–4.8)
LYMPHOCYTES NFR BLD AUTO: 28.3 %
MCH RBC QN AUTO: 31 PG (ref 26–34)
MCHC RBC AUTO-ENTMCNC: 33 G/DL (ref 32–36)
MCV RBC AUTO: 94 FL (ref 80–100)
MONOCYTES # BLD AUTO: 0.41 X10*3/UL (ref 0.1–1)
MONOCYTES NFR BLD AUTO: 6 %
NEUTROPHILS # BLD AUTO: 4.39 X10*3/UL (ref 1.2–7.7)
NEUTROPHILS NFR BLD AUTO: 64.6 %
NON HDL CHOLESTEROL: 71 MG/DL (ref 0–149)
NRBC BLD-RTO: 0 /100 WBCS (ref 0–0)
PLATELET # BLD AUTO: 341 X10*3/UL (ref 150–450)
POTASSIUM SERPL-SCNC: 4.3 MMOL/L (ref 3.5–5.3)
PROT SERPL-MCNC: 6.3 G/DL (ref 6.4–8.2)
PSA SERPL-MCNC: 3.27 NG/ML
PTH-INTACT SERPL-MCNC: 39 PG/ML (ref 18.5–88)
RBC # BLD AUTO: 4.77 X10*6/UL (ref 4.5–5.9)
SODIUM SERPL-SCNC: 142 MMOL/L (ref 136–145)
TIBC SERPL-MCNC: 314 UG/DL (ref 240–445)
TRIGL SERPL-MCNC: 77 MG/DL (ref 0–149)
TSH SERPL-ACNC: 2.5 MIU/L (ref 0.44–3.98)
UIBC SERPL-MCNC: 213 UG/DL (ref 110–370)
VIT B12 SERPL-MCNC: 431 PG/ML (ref 211–911)
VLDL: 15 MG/DL (ref 0–40)
WBC # BLD AUTO: 6.8 X10*3/UL (ref 4.4–11.3)

## 2024-11-20 PROCEDURE — 83550 IRON BINDING TEST: CPT

## 2024-11-20 PROCEDURE — 36415 COLL VENOUS BLD VENIPUNCTURE: CPT

## 2024-11-20 PROCEDURE — 82746 ASSAY OF FOLIC ACID SERUM: CPT

## 2024-11-20 PROCEDURE — 80061 LIPID PANEL: CPT

## 2024-11-20 PROCEDURE — 82728 ASSAY OF FERRITIN: CPT

## 2024-11-20 PROCEDURE — 83970 ASSAY OF PARATHORMONE: CPT

## 2024-11-20 PROCEDURE — 80053 COMPREHEN METABOLIC PANEL: CPT

## 2024-11-20 PROCEDURE — 84443 ASSAY THYROID STIM HORMONE: CPT

## 2024-11-20 PROCEDURE — 83540 ASSAY OF IRON: CPT

## 2024-11-20 PROCEDURE — 83036 HEMOGLOBIN GLYCOSYLATED A1C: CPT

## 2024-11-20 PROCEDURE — 84153 ASSAY OF PSA TOTAL: CPT

## 2024-11-20 PROCEDURE — 85025 COMPLETE CBC W/AUTO DIFF WBC: CPT

## 2024-11-20 PROCEDURE — 82607 VITAMIN B-12: CPT

## 2024-11-20 PROCEDURE — 82306 VITAMIN D 25 HYDROXY: CPT

## 2024-11-25 ENCOUNTER — TELEPHONE (OUTPATIENT)
Dept: CARDIOLOGY | Facility: CLINIC | Age: 64
End: 2024-11-25
Payer: MEDICAID

## 2024-11-25 NOTE — TELEPHONE ENCOUNTER
Pt notified of LHC scheduled on  12/12 with Dr. Hill at [facility].    . Cath lab will contact patient with arrival time. Nothing to eat after midnight.  Can have clear liquids up to 2 hours prior to procedure. Hold Metformin am of procedure. Can take rest of am meds am of procedure. Labs are completed.  Instruction sheet given/mailed to pt with fu appointment.

## 2024-11-27 ENCOUNTER — APPOINTMENT (OUTPATIENT)
Dept: PRIMARY CARE | Facility: CLINIC | Age: 64
End: 2024-11-27
Payer: MEDICAID

## 2024-11-27 VITALS
SYSTOLIC BLOOD PRESSURE: 113 MMHG | BODY MASS INDEX: 28.26 KG/M2 | HEIGHT: 69 IN | HEART RATE: 60 BPM | DIASTOLIC BLOOD PRESSURE: 74 MMHG | WEIGHT: 190.8 LBS

## 2024-11-27 DIAGNOSIS — E55.9 VITAMIN D DEFICIENCY: Primary | ICD-10-CM

## 2024-11-27 DIAGNOSIS — F31.9 BIPOLAR 1 DISORDER, DEPRESSED (MULTI): ICD-10-CM

## 2024-11-27 DIAGNOSIS — F41.9 ANXIETY: ICD-10-CM

## 2024-11-27 DIAGNOSIS — E78.2 MIXED HYPERLIPIDEMIA DUE TO TYPE 2 DIABETES MELLITUS (MULTI): ICD-10-CM

## 2024-11-27 DIAGNOSIS — F32.1 DEPRESSION, MAJOR, SINGLE EPISODE, MODERATE (MULTI): ICD-10-CM

## 2024-11-27 DIAGNOSIS — I25.10 CAD IN NATIVE ARTERY: ICD-10-CM

## 2024-11-27 DIAGNOSIS — E11.69 TYPE 2 DIABETES MELLITUS WITH OTHER SPECIFIED COMPLICATION, WITHOUT LONG-TERM CURRENT USE OF INSULIN: ICD-10-CM

## 2024-11-27 DIAGNOSIS — E11.69 MIXED HYPERLIPIDEMIA DUE TO TYPE 2 DIABETES MELLITUS (MULTI): ICD-10-CM

## 2024-11-27 DIAGNOSIS — E53.8 VITAMIN B12 DEFICIENCY: ICD-10-CM

## 2024-11-27 DIAGNOSIS — E03.9 HYPOTHYROIDISM, UNSPECIFIED TYPE: ICD-10-CM

## 2024-11-27 PROCEDURE — 3051F HG A1C>EQUAL 7.0%<8.0%: CPT | Performed by: NURSE PRACTITIONER

## 2024-11-27 PROCEDURE — 3078F DIAST BP <80 MM HG: CPT | Performed by: NURSE PRACTITIONER

## 2024-11-27 PROCEDURE — 3074F SYST BP LT 130 MM HG: CPT | Performed by: NURSE PRACTITIONER

## 2024-11-27 PROCEDURE — 3008F BODY MASS INDEX DOCD: CPT | Performed by: NURSE PRACTITIONER

## 2024-11-27 PROCEDURE — 3048F LDL-C <100 MG/DL: CPT | Performed by: NURSE PRACTITIONER

## 2024-11-27 PROCEDURE — 99214 OFFICE O/P EST MOD 30 MIN: CPT | Performed by: NURSE PRACTITIONER

## 2024-11-27 RX ORDER — ARIPIPRAZOLE 20 MG/1
20 TABLET ORAL DAILY
Qty: 90 TABLET | Refills: 1 | Status: SHIPPED | OUTPATIENT
Start: 2024-11-27

## 2024-11-27 RX ORDER — CLOPIDOGREL BISULFATE 75 MG/1
75 TABLET ORAL DAILY
Qty: 90 TABLET | Refills: 3 | Status: SHIPPED | OUTPATIENT
Start: 2024-11-27

## 2024-11-27 RX ORDER — FLUOXETINE HYDROCHLORIDE 20 MG/1
20 CAPSULE ORAL DAILY
Qty: 90 CAPSULE | Refills: 1 | Status: SHIPPED | OUTPATIENT
Start: 2024-11-27

## 2024-11-27 RX ORDER — LEVOTHYROXINE SODIUM 25 UG/1
25 TABLET ORAL DAILY
Qty: 90 TABLET | Refills: 3 | Status: SHIPPED | OUTPATIENT
Start: 2024-11-27

## 2024-11-27 RX ORDER — ATORVASTATIN CALCIUM 40 MG/1
40 TABLET, FILM COATED ORAL NIGHTLY
Qty: 90 TABLET | Refills: 3 | Status: SHIPPED | OUTPATIENT
Start: 2024-11-27

## 2024-11-27 RX ORDER — CHOLECALCIFEROL (VITAMIN D3) 50 MCG
50 TABLET ORAL DAILY
Qty: 90 TABLET | Refills: 3 | Status: SHIPPED | OUTPATIENT
Start: 2024-11-27 | End: 2025-11-27

## 2024-11-27 ASSESSMENT — ENCOUNTER SYMPTOMS
PALPITATIONS: 0
FREQUENCY: 0
SLEEP DISTURBANCE: 0
COUGH: 0
WOUND: 0
SPEECH DIFFICULTY: 0
ABDOMINAL PAIN: 0
FLANK PAIN: 0
TROUBLE SWALLOWING: 0
FEVER: 0
DYSURIA: 0
WHEEZING: 0
APNEA: 0
SEIZURES: 0
CONFUSION: 0
BLOOD IN STOOL: 0
ARTHRALGIAS: 0
SHORTNESS OF BREATH: 0
NERVOUS/ANXIOUS: 0
CHEST TIGHTNESS: 0
DIFFICULTY URINATING: 0
BACK PAIN: 0
UNEXPECTED WEIGHT CHANGE: 0
HEMATURIA: 0
HEADACHES: 0
FACIAL ASYMMETRY: 0
DIARRHEA: 0
PHOTOPHOBIA: 0
ABDOMINAL DISTENTION: 0
VOMITING: 0
NUMBNESS: 0
MYALGIAS: 0
SORE THROAT: 0
EYE REDNESS: 0
CONSTIPATION: 0
JOINT SWELLING: 0
WEAKNESS: 0
CHOKING: 0
EYE PAIN: 0
CHILLS: 0
FATIGUE: 0
NECK PAIN: 0
DIZZINESS: 0
NAUSEA: 0

## 2024-11-27 ASSESSMENT — PATIENT HEALTH QUESTIONNAIRE - PHQ9
1. LITTLE INTEREST OR PLEASURE IN DOING THINGS: NEARLY EVERY DAY
9. THOUGHTS THAT YOU WOULD BE BETTER OFF DEAD, OR OF HURTING YOURSELF: NOT AT ALL
8. MOVING OR SPEAKING SO SLOWLY THAT OTHER PEOPLE COULD HAVE NOTICED. OR THE OPPOSITE, BEING SO FIGETY OR RESTLESS THAT YOU HAVE BEEN MOVING AROUND A LOT MORE THAN USUAL: NOT AT ALL
SUM OF ALL RESPONSES TO PHQ9 QUESTIONS 1 AND 2: 0
2. FEELING DOWN, DEPRESSED OR HOPELESS: NOT AT ALL
SUM OF ALL RESPONSES TO PHQ QUESTIONS 1-9: 8
6. FEELING BAD ABOUT YOURSELF - OR THAT YOU ARE A FAILURE OR HAVE LET YOURSELF OR YOUR FAMILY DOWN: NOT AT ALL
3. TROUBLE FALLING OR STAYING ASLEEP OR SLEEPING TOO MUCH: NOT AT ALL
10. IF YOU CHECKED OFF ANY PROBLEMS, HOW DIFFICULT HAVE THESE PROBLEMS MADE IT FOR YOU TO DO YOUR WORK, TAKE CARE OF THINGS AT HOME, OR GET ALONG WITH OTHER PEOPLE: SOMEWHAT DIFFICULT
2. FEELING DOWN, DEPRESSED OR HOPELESS: NOT AT ALL
1. LITTLE INTEREST OR PLEASURE IN DOING THINGS: NOT AT ALL
7. TROUBLE CONCENTRATING ON THINGS, SUCH AS READING THE NEWSPAPER OR WATCHING TELEVISION: MORE THAN HALF THE DAYS
SUM OF ALL RESPONSES TO PHQ9 QUESTIONS 1 AND 2: 3
4. FEELING TIRED OR HAVING LITTLE ENERGY: NEARLY EVERY DAY
5. POOR APPETITE OR OVEREATING: NOT AT ALL

## 2024-11-27 ASSESSMENT — COLUMBIA-SUICIDE SEVERITY RATING SCALE - C-SSRS
1. IN THE PAST MONTH, HAVE YOU WISHED YOU WERE DEAD OR WISHED YOU COULD GO TO SLEEP AND NOT WAKE UP?: NO
2. HAVE YOU ACTUALLY HAD ANY THOUGHTS OF KILLING YOURSELF?: NO
6. HAVE YOU EVER DONE ANYTHING, STARTED TO DO ANYTHING, OR PREPARED TO DO ANYTHING TO END YOUR LIFE?: NO

## 2024-11-27 NOTE — PROGRESS NOTES
"Subjective   Patient ID: Naveed Brown is a 64 y.o. male who presents for Follow-up (6 WEEK F/U, LABS).      HPI:  Presents today for 6 WEEK MED CHECK.  LAB FU. NO NEW COMPLAINTS         DM- HGA1C 7.2% COMPARED TO 7.1%. HE IS NOT COMPLIANT WITH MEDICATIONS AND MISSES DOSES. IMPORTANCE OF MED ADHERENCE DISCUSSED. METFORMIN DOES CAUSE DIARRHEA SO ONLY TAKES DAILY. STOP METFORMIN. START JARDIANCE 10 MG   VIT D- LOW. START DAILY VIT D3  THYROID- STABLE  CAD- LEFT HEART CATH SCHEDULED ON 12/12/24 WITH DR. SUTTON.  BIPOLAR/DEPRESSION- REFERRED TO [facility] BUT HAS NOT SEEN THE DOCTOR YET. HE HAS SEEN THE COUNSELOR TWICE. NEXT APPT ON 12/6/24. REFUSING MED MANAGEMENT AT THIS TIME. DENIES SUICIDAL IDEATION       Visit Vitals  /74   Pulse 60   Ht 1.753 m (5' 9\")   Wt 86.5 kg (190 lb 12.8 oz)   BMI 28.18 kg/m²   Smoking Status Former   BSA 2.05 m²        Review of Systems   Constitutional:  Negative for chills, fatigue, fever and unexpected weight change.   HENT:  Negative for congestion, ear pain, sore throat and trouble swallowing.    Eyes:  Negative for photophobia, pain, redness and visual disturbance.   Respiratory:  Negative for apnea, cough, choking, chest tightness, shortness of breath and wheezing.    Cardiovascular:  Negative for chest pain, palpitations and leg swelling.   Gastrointestinal:  Negative for abdominal distention, abdominal pain, blood in stool, constipation, diarrhea, nausea and vomiting.   Genitourinary:  Negative for difficulty urinating, dysuria, flank pain, frequency, hematuria and urgency.   Musculoskeletal:  Negative for arthralgias, back pain, gait problem, joint swelling, myalgias and neck pain.   Skin:  Negative for rash and wound.   Neurological:  Negative for dizziness, seizures, syncope, facial asymmetry, speech difficulty, weakness, numbness and headaches.   Psychiatric/Behavioral:  Negative for confusion, sleep disturbance and suicidal ideas. The patient is not " nervous/anxious.        Objective   Component      Latest Ref Rng 11/20/2024   WBC      4.4 - 11.3 x10*3/uL 6.8    nRBC      0.0 - 0.0 /100 WBCs 0.0    RBC      4.50 - 5.90 x10*6/uL 4.77    HEMOGLOBIN      13.5 - 17.5 g/dL 14.8    HEMATOCRIT      41.0 - 52.0 % 44.9    MCV      80 - 100 fL 94    MCH      26.0 - 34.0 pg 31.0    MCHC      32.0 - 36.0 g/dL 33.0    RED CELL DISTRIBUTION WIDTH      11.5 - 14.5 % 12.8    Platelets      150 - 450 x10*3/uL 341    Neutrophils %      40.0 - 80.0 % 64.6    Immature Granulocytes %, Automated      0.0 - 0.9 % 0.1    Lymphocytes %      13.0 - 44.0 % 28.3    Monocytes %      2.0 - 10.0 % 6.0    Eosinophils %      0.0 - 6.0 % 0.4    Basophils %      0.0 - 2.0 % 0.6    Neutrophils Absolute      1.20 - 7.70 x10*3/uL 4.39    Immature Granulocytes Absolute, Automated      0.00 - 0.70 x10*3/uL 0.01    Lymphocytes Absolute      1.20 - 4.80 x10*3/uL 1.93    Monocytes Absolute      0.10 - 1.00 x10*3/uL 0.41    Eosinophils Absolute      0.00 - 0.70 x10*3/uL 0.03    Basophils Absolute      0.00 - 0.10 x10*3/uL 0.04    GLUCOSE      74 - 99 mg/dL 169 (H)    SODIUM      136 - 145 mmol/L 142    POTASSIUM      3.5 - 5.3 mmol/L 4.3    CHLORIDE      98 - 107 mmol/L 105    Bicarbonate      21 - 32 mmol/L 31    Anion Gap      10 - 20 mmol/L 10    Blood Urea Nitrogen      6 - 23 mg/dL 16    Creatinine      0.50 - 1.30 mg/dL 0.75    EGFR      >60 mL/min/1.73m*2 >90    Calcium      8.6 - 10.3 mg/dL 9.0    Albumin      3.4 - 5.0 g/dL 4.2    Alkaline Phosphatase      33 - 136 U/L 96    Total Protein      6.4 - 8.2 g/dL 6.3 (L)    AST      9 - 39 U/L 17    Bilirubin Total      0.0 - 1.2 mg/dL 0.7    ALT      10 - 52 U/L 24    CHOLESTEROL      0 - 199 mg/dL 125    HDL CHOLESTEROL      mg/dL 54.0    Cholesterol/HDL Ratio 2.3    LDL Calculated      <=99 mg/dL 56    VLDL      0 - 40 mg/dL 15    TRIGLYCERIDES      0 - 149 mg/dL 77    Non HDL Cholesterol      0 - 149 mg/dL 71    IRON      35 - 150 ug/dL 101     UIBC      110 - 370 ug/dL 213    TIBC      240 - 445 ug/dL 314    % Saturation      25 - 45 % 32    Hemoglobin A1C      See comment % 7.2 (H)    Estimated Average Glucose      Not Established mg/dL 160    Prostate Specific Antigen,Screen      <=4.00 ng/mL 3.27    Thyroid Stimulating Hormone      0.44 - 3.98 mIU/L 2.50    Vitamin B12      211 - 911 pg/mL 431    Vitamin D, 25-Hydroxy, Total      30 - 100 ng/mL 23 (L)    Parathyroid Hormone, Intact      18.5 - 88.0 pg/mL 39.0    FERRITIN      20 - 300 ng/mL 170    FOLATE      >5.0 ng/mL 11.4       Legend:  (H) High  (L) Low  Physical Exam  Constitutional:       Appearance: Normal appearance. He is normal weight.   HENT:      Head: Normocephalic.   Eyes:      Extraocular Movements: Extraocular movements intact.      Conjunctiva/sclera: Conjunctivae normal.      Pupils: Pupils are equal, round, and reactive to light.   Cardiovascular:      Rate and Rhythm: Normal rate and regular rhythm.      Pulses: Normal pulses.      Heart sounds: Normal heart sounds.   Pulmonary:      Effort: Pulmonary effort is normal.      Breath sounds: Normal breath sounds.   Musculoskeletal:         General: Normal range of motion.      Cervical back: Normal range of motion.   Skin:     General: Skin is warm and dry.   Neurological:      General: No focal deficit present.      Mental Status: He is alert and oriented to person, place, and time.   Psychiatric:         Mood and Affect: Mood normal.         Behavior: Behavior normal.         Thought Content: Thought content normal.         Judgment: Judgment normal.            Assessment/Plan   Problem List Items Addressed This Visit       Anxiety    Relevant Medications    ARIPiprazole (Abilify) 20 mg tablet    CAD in native artery    Relevant Medications    clopidogrel (Plavix) 75 mg tablet    Depression, major, single episode, moderate (Multi)    Relevant Medications    ARIPiprazole (Abilify) 20 mg tablet    FLUoxetine (PROzac) 20 mg capsule     DM2 (diabetes mellitus, type 2) (Multi)    Relevant Medications    empagliflozin (Jardiance) 10 mg    Other Relevant Orders    CBC and Auto Differential    Comprehensive Metabolic Panel    Hemoglobin A1C    Albumin-Creatinine Ratio, Urine Random    Mixed hyperlipidemia due to type 2 diabetes mellitus (Multi)    Relevant Medications    atorvastatin (Lipitor) 40 mg tablet    Hypothyroidism    Relevant Medications    levothyroxine (Synthroid, Levoxyl) 25 mcg tablet    Other Relevant Orders    Thyroid Stimulating Hormone    Thyroxine, Free    Triiodothyronine, Free    Vitamin B12 deficiency    Relevant Orders    Vitamin B12    Vitamin D deficiency - Primary    Relevant Medications    cholecalciferol (Vitamin D3) 50 MCG (2000 UT) tablet    Other Relevant Orders    Vitamin D 25-Hydroxy,Total (for eval of Vitamin D levels)    Parathyroid Hormone, Intact    Bipolar 1 disorder, depressed (Multi)     WE DISCUSSED MOST COMMON SIDE EFFECTS OF PRESCRIBED MEDICATIONS. INDICATIONS, RISK, COMPLICATIONS, AND ALTERNATIVES OF MEDICATION/THERAPEUTICS WERE EXPLAINED AND DISCUSSED. PLEASE MONITOR CLOSELY FOR ANY UNTOWARD SIDE EFFECTS OR COMPLICATIONS OF MEDICATIONS. PATIENT IS STRONGLY ADVISED TO BE COMPLIANT WITH RECOMMENDATIONS. QUESTIONS AND CONCERNS WERE ADDRESSED. INSTRUCTED TO CALL, RETURN SOONER, OR GO TO THE ER,  IF SYMPTOMS PERSIST OR WORSEN. THEY VOICED UNDERSTANDING AND  DENIES FURTHER QUESTIONS AT THIS TIME.    TIME CODE  1. PREPARATION FOR PATIENT'S VISIT (REVIEWING CHART, CURRENT MEDICAL RECORDS, OUTSIDE HEALTH PROVIDER RECORDS, PREVIOUS HISTORY, EXAM, TEST, PROCEDURE, AND MEDICATIONS)  2. FACE TO FACE ENCOUNTER OBTAINING HISTORY FROM THE PATIENT/FAMILY/CAREGIVERS; PERFORMING EVALUATION AND EXAMINATION; ORDERING TESTS OR PROCEDURES; REFERRING AND COMMUNICATING WITH OTHER HEALTHCARE PROVIDERS; COUNSELING AND EDUCATION OF THE PATIENT/FAMILY/CAREGIVERS; INDEPENDENTLY INTERPRETING RESULTS (TESTS, LABS, PROCEDURES, IMAGING) AND  COMMUNICATING AND EXPLAINING RESULTS TO THE PATIENT/FAMILY/CAREGIVERS  3. COORDINATION OF CARE; PREPARING AND PRINTING DISCHARGE INSTRUCTIONS AND ANY EDUCATIONAL MATERIAL FOR THE PATIENT/FAMILY/CAREGIVERS. DOCUMENTING CLINICAL INFORMATION IN THE ELECTRONIC MEDICAL RECORD   4. REVIEWING OARRS AS NEEDED    MDM  1) COMPLEXITY: MORE THAN 1 STABLE CHRONIC CONDITION ADDRESSED OR 1 ACUTE ILLNESS ADDRESSED   2)DATA: TESTS INTERPRETED AND OR ORDERED, TOOK INDEPENDENT HISTORY OR RECORDS REVIEWED  3)RISK: MODERATE RISK DUE TO NATURE OF MEDICAL CONDITIONS/COMORBIDITY OR MEDICATIONS ORDERED OR SURGICAL OR PROCEDURE REFERRAL    6 MONTHS WITH LABS

## 2024-12-06 ENCOUNTER — PATIENT OUTREACH (OUTPATIENT)
Dept: PRIMARY CARE | Facility: CLINIC | Age: 64
End: 2024-12-06
Payer: MEDICAID

## 2024-12-06 NOTE — PROGRESS NOTES
Successful one month outreach to patient regarding hospitalization as patient continues TCM program.   At time of outreach call the patient feels as if their condition has improved since initial visit with PCP or specialist.  Questions or concerns addressed at this time with patient.   Pt last PCP appt 11/27/2024.    Verified upcoming appts;  -12/20/2024 1445 cardiology  -5/27/2025 1420 PCP; pt aware of lab work orders    Pt denies any current needs from PCP and states he has all of his medication. Pt denies any questions, needs, or concerns. He is encouraged to call if questions or needs arise.

## 2024-12-12 ENCOUNTER — APPOINTMENT (OUTPATIENT)
Dept: CARDIOLOGY | Facility: HOSPITAL | Age: 64
End: 2024-12-12
Payer: MEDICAID

## 2024-12-12 ENCOUNTER — HOSPITAL ENCOUNTER (OUTPATIENT)
Facility: HOSPITAL | Age: 64
Setting detail: OUTPATIENT SURGERY
Discharge: HOME | End: 2024-12-12
Attending: STUDENT IN AN ORGANIZED HEALTH CARE EDUCATION/TRAINING PROGRAM | Admitting: STUDENT IN AN ORGANIZED HEALTH CARE EDUCATION/TRAINING PROGRAM
Payer: MEDICAID

## 2024-12-12 VITALS
DIASTOLIC BLOOD PRESSURE: 74 MMHG | HEART RATE: 65 BPM | SYSTOLIC BLOOD PRESSURE: 120 MMHG | TEMPERATURE: 97.2 F | BODY MASS INDEX: 27.56 KG/M2 | OXYGEN SATURATION: 100 % | RESPIRATION RATE: 15 BRPM | WEIGHT: 186.07 LBS | HEIGHT: 69 IN

## 2024-12-12 DIAGNOSIS — R07.89 OTHER CHEST PAIN: ICD-10-CM

## 2024-12-12 DIAGNOSIS — I25.10 CAD IN NATIVE ARTERY: Primary | ICD-10-CM

## 2024-12-12 DIAGNOSIS — Z95.5 S/P DRUG ELUTING CORONARY STENT PLACEMENT: ICD-10-CM

## 2024-12-12 LAB
ANION GAP SERPL CALC-SCNC: 10 MMOL/L (ref 10–20)
BUN SERPL-MCNC: 13 MG/DL (ref 6–23)
CALCIUM SERPL-MCNC: 8.8 MG/DL (ref 8.6–10.3)
CHLORIDE SERPL-SCNC: 108 MMOL/L (ref 98–107)
CO2 SERPL-SCNC: 27 MMOL/L (ref 21–32)
CREAT SERPL-MCNC: 0.74 MG/DL (ref 0.5–1.3)
EGFRCR SERPLBLD CKD-EPI 2021: >90 ML/MIN/1.73M*2
ERYTHROCYTE [DISTWIDTH] IN BLOOD BY AUTOMATED COUNT: 12.9 % (ref 11.5–14.5)
GLUCOSE SERPL-MCNC: 142 MG/DL (ref 74–99)
HCT VFR BLD AUTO: 43.2 % (ref 41–52)
HGB BLD-MCNC: 14.3 G/DL (ref 13.5–17.5)
MCH RBC QN AUTO: 30.5 PG (ref 26–34)
MCHC RBC AUTO-ENTMCNC: 33.1 G/DL (ref 32–36)
MCV RBC AUTO: 92 FL (ref 80–100)
NRBC BLD-RTO: 0 /100 WBCS (ref 0–0)
PLATELET # BLD AUTO: 309 X10*3/UL (ref 150–450)
POTASSIUM SERPL-SCNC: 4.3 MMOL/L (ref 3.5–5.3)
RBC # BLD AUTO: 4.69 X10*6/UL (ref 4.5–5.9)
SODIUM SERPL-SCNC: 141 MMOL/L (ref 136–145)
WBC # BLD AUTO: 6.9 X10*3/UL (ref 4.4–11.3)

## 2024-12-12 PROCEDURE — C1887 CATHETER, GUIDING: HCPCS | Performed by: STUDENT IN AN ORGANIZED HEALTH CARE EDUCATION/TRAINING PROGRAM

## 2024-12-12 PROCEDURE — 92978 ENDOLUMINL IVUS OCT C 1ST: CPT | Mod: RC | Performed by: STUDENT IN AN ORGANIZED HEALTH CARE EDUCATION/TRAINING PROGRAM

## 2024-12-12 PROCEDURE — 93458 L HRT ARTERY/VENTRICLE ANGIO: CPT | Performed by: STUDENT IN AN ORGANIZED HEALTH CARE EDUCATION/TRAINING PROGRAM

## 2024-12-12 PROCEDURE — 82374 ASSAY BLOOD CARBON DIOXIDE: CPT | Performed by: STUDENT IN AN ORGANIZED HEALTH CARE EDUCATION/TRAINING PROGRAM

## 2024-12-12 PROCEDURE — 36415 COLL VENOUS BLD VENIPUNCTURE: CPT | Performed by: STUDENT IN AN ORGANIZED HEALTH CARE EDUCATION/TRAINING PROGRAM

## 2024-12-12 PROCEDURE — 2550000001 HC RX 255 CONTRASTS: Performed by: STUDENT IN AN ORGANIZED HEALTH CARE EDUCATION/TRAINING PROGRAM

## 2024-12-12 PROCEDURE — 93005 ELECTROCARDIOGRAM TRACING: CPT

## 2024-12-12 PROCEDURE — 96373 THER/PROPH/DIAG INJ IA: CPT | Performed by: STUDENT IN AN ORGANIZED HEALTH CARE EDUCATION/TRAINING PROGRAM

## 2024-12-12 PROCEDURE — 7100000010 HC PHASE TWO TIME - EACH INCREMENTAL 1 MINUTE: Performed by: STUDENT IN AN ORGANIZED HEALTH CARE EDUCATION/TRAINING PROGRAM

## 2024-12-12 PROCEDURE — 99153 MOD SED SAME PHYS/QHP EA: CPT | Performed by: STUDENT IN AN ORGANIZED HEALTH CARE EDUCATION/TRAINING PROGRAM

## 2024-12-12 PROCEDURE — 2500000001 HC RX 250 WO HCPCS SELF ADMINISTERED DRUGS (ALT 637 FOR MEDICARE OP)

## 2024-12-12 PROCEDURE — 92928 PRQ TCAT PLMT NTRAC ST 1 LES: CPT | Performed by: STUDENT IN AN ORGANIZED HEALTH CARE EDUCATION/TRAINING PROGRAM

## 2024-12-12 PROCEDURE — 2780000003 HC OR 278 NO HCPCS: Performed by: STUDENT IN AN ORGANIZED HEALTH CARE EDUCATION/TRAINING PROGRAM

## 2024-12-12 PROCEDURE — 93010 ELECTROCARDIOGRAM REPORT: CPT | Performed by: STUDENT IN AN ORGANIZED HEALTH CARE EDUCATION/TRAINING PROGRAM

## 2024-12-12 PROCEDURE — 92978 ENDOLUMINL IVUS OCT C 1ST: CPT | Performed by: STUDENT IN AN ORGANIZED HEALTH CARE EDUCATION/TRAINING PROGRAM

## 2024-12-12 PROCEDURE — C1769 GUIDE WIRE: HCPCS | Performed by: STUDENT IN AN ORGANIZED HEALTH CARE EDUCATION/TRAINING PROGRAM

## 2024-12-12 PROCEDURE — C1894 INTRO/SHEATH, NON-LASER: HCPCS | Performed by: STUDENT IN AN ORGANIZED HEALTH CARE EDUCATION/TRAINING PROGRAM

## 2024-12-12 PROCEDURE — C1753 CATH, INTRAVAS ULTRASOUND: HCPCS | Performed by: STUDENT IN AN ORGANIZED HEALTH CARE EDUCATION/TRAINING PROGRAM

## 2024-12-12 PROCEDURE — 7100000009 HC PHASE TWO TIME - INITIAL BASE CHARGE: Performed by: STUDENT IN AN ORGANIZED HEALTH CARE EDUCATION/TRAINING PROGRAM

## 2024-12-12 PROCEDURE — C1725 CATH, TRANSLUMIN NON-LASER: HCPCS | Performed by: STUDENT IN AN ORGANIZED HEALTH CARE EDUCATION/TRAINING PROGRAM

## 2024-12-12 PROCEDURE — C1874 STENT, COATED/COV W/DEL SYS: HCPCS | Performed by: STUDENT IN AN ORGANIZED HEALTH CARE EDUCATION/TRAINING PROGRAM

## 2024-12-12 PROCEDURE — 2500000004 HC RX 250 GENERAL PHARMACY W/ HCPCS (ALT 636 FOR OP/ED): Performed by: STUDENT IN AN ORGANIZED HEALTH CARE EDUCATION/TRAINING PROGRAM

## 2024-12-12 PROCEDURE — 85027 COMPLETE CBC AUTOMATED: CPT | Performed by: STUDENT IN AN ORGANIZED HEALTH CARE EDUCATION/TRAINING PROGRAM

## 2024-12-12 PROCEDURE — 99152 MOD SED SAME PHYS/QHP 5/>YRS: CPT | Performed by: STUDENT IN AN ORGANIZED HEALTH CARE EDUCATION/TRAINING PROGRAM

## 2024-12-12 PROCEDURE — 2720000007 HC OR 272 NO HCPCS: Performed by: STUDENT IN AN ORGANIZED HEALTH CARE EDUCATION/TRAINING PROGRAM

## 2024-12-12 PROCEDURE — C1760 CLOSURE DEV, VASC: HCPCS | Performed by: STUDENT IN AN ORGANIZED HEALTH CARE EDUCATION/TRAINING PROGRAM

## 2024-12-12 PROCEDURE — C9600 PERC DRUG-EL COR STENT SING: HCPCS | Mod: RC | Performed by: STUDENT IN AN ORGANIZED HEALTH CARE EDUCATION/TRAINING PROGRAM

## 2024-12-12 DEVICE — STENT ONYXNG25038UX ONYX 2.50X38RX
Type: IMPLANTABLE DEVICE | Site: HEART | Status: FUNCTIONAL
Brand: ONYX FRONTIER™

## 2024-12-12 RX ORDER — HEPARIN SODIUM 1000 [USP'U]/ML
INJECTION, SOLUTION INTRAVENOUS; SUBCUTANEOUS AS NEEDED
Status: DISCONTINUED | OUTPATIENT
Start: 2024-12-12 | End: 2024-12-12 | Stop reason: HOSPADM

## 2024-12-12 RX ORDER — NITROGLYCERIN 40 MG/100ML
INJECTION INTRAVENOUS AS NEEDED
Status: DISCONTINUED | OUTPATIENT
Start: 2024-12-12 | End: 2024-12-12 | Stop reason: HOSPADM

## 2024-12-12 RX ORDER — ACETAMINOPHEN 325 MG/1
650 TABLET ORAL EVERY 6 HOURS PRN
Status: DISCONTINUED | OUTPATIENT
Start: 2024-12-12 | End: 2024-12-16 | Stop reason: HOSPADM

## 2024-12-12 RX ORDER — FENTANYL CITRATE 50 UG/ML
INJECTION, SOLUTION INTRAMUSCULAR; INTRAVENOUS AS NEEDED
Status: DISCONTINUED | OUTPATIENT
Start: 2024-12-12 | End: 2024-12-12 | Stop reason: HOSPADM

## 2024-12-12 RX ORDER — ASPIRIN 325 MG
325 TABLET ORAL ONCE
Status: COMPLETED | OUTPATIENT
Start: 2024-12-12 | End: 2024-12-12

## 2024-12-12 RX ORDER — MIDAZOLAM HYDROCHLORIDE 1 MG/ML
INJECTION, SOLUTION INTRAMUSCULAR; INTRAVENOUS AS NEEDED
Status: DISCONTINUED | OUTPATIENT
Start: 2024-12-12 | End: 2024-12-12 | Stop reason: HOSPADM

## 2024-12-12 RX ORDER — LIDOCAINE HYDROCHLORIDE 20 MG/ML
INJECTION, SOLUTION INFILTRATION; PERINEURAL AS NEEDED
Status: DISCONTINUED | OUTPATIENT
Start: 2024-12-12 | End: 2024-12-12 | Stop reason: HOSPADM

## 2024-12-12 RX ORDER — NAPROXEN SODIUM 220 MG/1
81 TABLET, FILM COATED ORAL DAILY
Qty: 90 TABLET | Refills: 3 | Status: SHIPPED | OUTPATIENT
Start: 2024-12-12 | End: 2025-12-12

## 2024-12-12 RX ORDER — IODIXANOL 320 MG/ML
INJECTION, SOLUTION INTRAVASCULAR AS NEEDED
Status: DISCONTINUED | OUTPATIENT
Start: 2024-12-12 | End: 2024-12-12 | Stop reason: HOSPADM

## 2024-12-12 RX ORDER — CLOPIDOGREL BISULFATE 75 MG/1
75 TABLET ORAL ONCE
Status: COMPLETED | OUTPATIENT
Start: 2024-12-12 | End: 2024-12-12

## 2024-12-12 RX ORDER — VERAPAMIL HYDROCHLORIDE 2.5 MG/ML
INJECTION, SOLUTION INTRAVENOUS AS NEEDED
Status: DISCONTINUED | OUTPATIENT
Start: 2024-12-12 | End: 2024-12-12 | Stop reason: HOSPADM

## 2024-12-12 ASSESSMENT — PAIN SCALES - GENERAL

## 2024-12-12 ASSESSMENT — PAIN - FUNCTIONAL ASSESSMENT

## 2024-12-12 ASSESSMENT — COLUMBIA-SUICIDE SEVERITY RATING SCALE - C-SSRS
2. HAVE YOU ACTUALLY HAD ANY THOUGHTS OF KILLING YOURSELF?: NO
1. IN THE PAST MONTH, HAVE YOU WISHED YOU WERE DEAD OR WISHED YOU COULD GO TO SLEEP AND NOT WAKE UP?: NO
6. HAVE YOU EVER DONE ANYTHING, STARTED TO DO ANYTHING, OR PREPARED TO DO ANYTHING TO END YOUR LIFE?: NO

## 2024-12-12 NOTE — Clinical Note
Angioplasty of the middle right coronary artery lesion. Inflation 1: Pressure = 10 opal; Duration = 10 sec. Inflation 2: Pressure = 12 opal; Duration = 12 sec. Inflation 3: Pressure = 12 opal; Duration = 12 sec. Inflation 4: Pressure = 12 opal; Duration = 12 sec. Removed after inflations

## 2024-12-12 NOTE — POST-PROCEDURE NOTE
Physician Transition of Care Summary  Invasive Cardiovascular Lab    Procedure Date: 12/12/2024  Attending:    * Jerald Hill - Primary  Resident/Fellow/Other Assistant: Surgeons and Role:  * No surgeons found with a matching role *    Indications:   Pre-op Diagnosis      * CAD in native artery [I25.10]    Post-procedure diagnosis:   Post-op Diagnosis     * CAD in native artery [I25.10]    Procedure(s):   Left Heart Cath  47759 - MA L HRT CATH W/NJX L VENTRICULOGRAPHY IMG S&I    PCI KELLY Stent- Coronary    IVUS - Coronary  94202 - MA ENDOLUMINAL CORONARY IVUS OCT I&R INITIAL VESSEL    Procedure Findings:   Double vessel coronary artery disease  Patent stent to prox-mid LAD  Mid RCA 99% sub occluded lesion  Preserved LV systolic function  S/P Successful PCI using one drug-eluting stent (KELLY) 2.5/38mm (post-dil 2.75mm NC balloon) guided by IVUS    Description of the Procedure:     R radial artery access with 6F sheath. LV and Ao hemodynamic measurements. S/P Left Heart Catheterization that showed double vessel obstructive coronary artery disease.     Catheter has been switched to 6F JR 4. Heparinization 100ui/Kg. ACT > 250s.     S/P successful percutaneous coronary intervention to mid RCA using one drug-eluting stent (KELLY) 2.5/38mm (post-dil 2.75mm NC balloon) guided by intra-vascular ultrasound (IVUS). Patient remained stable during the entire procedure. No complications. Hemostasis with TR Band.     Plan:    Patient loaded with ASA 325mg and Plavix 300mg. Should be discharged home keeping ASA 81mg daily and Plavix 75mg daily.   Compared to the pre-procedural EKG, post-procedural EKG with no new abnormalities and no signs of acute coronary syndrome.   Keep hydration 100mL/h for at least 3 hours, ideally 6 hours.   Patient may be discharged home after bed rest for 3 hours. Constant check for bleeding. Maintain compression band (CB) for 120 minutes past procedure. Remove 3mL of air from CB every 15 minutes  until fully deflated. If recurrent bleeding occurs, re-inflate with enough air to fully restore hemostasis (2 to 3 mL, maximum of 18 mL). Maintain CB at this same level for 30 minutes before attempting to re-deflate. Once fully deflated, carefully remove CB and place a sterile dressing over access site.  Observe for one hour, checking pulse every 15 minutes.  Instruct patient not to use or bend their wrist for four hours.    Cardiac rehab. Cardiology follow up. Would suggest to keep conservative treatment and guideline-directed medical therapy (GDMT) for the remaining lesions.     Complications:   No    Stents/Implants:   Implants       Stent    Stent, [place] Onesimo, 2.50 X 38rx - [id number] - Implanted        Inventory item: STENT, SUNG FRONTIER ONESIMO, 2.50 X 38RX Model/Cat number: [id number]    : [employer] Lot number: [id number]    Device identifier: [id number]        GUDID Information       Request status Successful        Brand name: Sung [employer]™ Version/Model: [id number]    Company name: [employer] MRI safety info as of 12/12/24: MR Conditional    Contains dry or latex rubber: No      GMDN P.T. name: Drug-eluting coronary artery stent, non-bioabsorbable-polymer-coated                As of 12/12/2024       Status: Implanted                            Anticoagulation/Antiplatelet Plan:   Should be discharged home keeping ASA 81mg daily and Plavix 75mg daily.     Estimated Blood Loss:   * No values recorded between 12/12/2024  9:30 AM and 12/12/2024 10:17 AM *    Anesthesia: Moderate Sedation Anesthesia Staff: No anesthesia staff entered.    Any Specimen(s) Removed:   Order Name Source Comment Collection Info Order Time   BASIC METABOLIC PANEL Blood, Venous  Collected By: Tiera Davidson RN 12/12/2024  8:02 AM     Release result to Oleryhart   Immediate        CBC Blood, Venous  Collected By: Tiera Davidson RN 12/12/2024  8:02 AM     Release result to Oleryhart   Immediate           Disposition:   Home    Electronically signed by: Jerald Hill MD, 12/12/2024 10:17 AM

## 2024-12-12 NOTE — Clinical Note
Vessel: RCA (mid). Stent inserted. Inflation 1: Pressure = 12 opal; Duration = 30 sec. Removed after deployment

## 2024-12-12 NOTE — NURSING NOTE
I met with Naveed Brown in his room in bay 3 of the cath lab S/P PCI on 12/12/2024. He is alert and oriented and open to discussion. I reviewed contact information, allergies, current medications, and past medical history. We discussed what cardiac rehab entails, length of time, session length, and education. Naveed Brown is not interested in coming to cardiac rehab. Cardiac rehab folder reviewed and provided along with our contact information. All questions answered.

## 2024-12-12 NOTE — NURSING NOTE
Patient discharged via wheelchair to private vehicle.    Home going instructions specific to procedure given: Yes    1 Easily Arousable; Responding Appropriately: Yes    2. VS +/- 20% of preprocedure status: Yes     3. Significant complications are absent: Yes    4. Ambulates without dizziness/Age appropriate activity: Yes    5. Pulse Ox great than or equal to 92% or above on room air /ordered oxygen treatment: Yes    6. Care Plan Complete: Yes    7. Discharge education completed and information provided to patient and family: Yes    8. If Patient had PCI performed-Stent card sent home with Patient: Yes    Patient and family have no further questions at this time. Gave patient and family department number and office number if any questions should arise.

## 2024-12-12 NOTE — Clinical Note
Angioplasty of the middle right coronary artery lesion. Inflation 1: Pressure = 12 opal; Duration = 12 sec. Inflation 2: Pressure = 15 opal; Duration = 15 sec. Inflation 3: Pressure = 15 opal; Duration = 10 sec. Inflation 4: Pressure = 24 opal; Duration = 15 sec. Removed after inflations

## 2024-12-12 NOTE — DISCHARGE INSTRUCTIONS
CARDIAC CATHETERIZATION DISCHARGE INSTRUCTIONS     FOR SUDDEN AND SEVERE CHEST PAIN, SHORTNESS OF BREATH, EXCESSIVE BLEEDING, SIGNS OF STROKE, OR CHANGES IN MENTAL STATUS YOU SHOULD CALL 911 IMMEDIATELY.     If your provider has prescribed aspirin and/or clopidogrel (Plavix), or prasugrel (Effient), or ticagrelor (Brilinta), DO NOT STOP THESE MEDICATIONS for any reason without talking to your cardiologist first. If any of these were prescribed, you must take them every day without missing a single dose. If you are getting low on these medications, contact your provider immediately for a refill.     FOR NEXT 24 HOURS  - Upon discharge, you should return home and rest for the remainder of the day and evening. You do not have to stay on bed rest but should not be very active.  It is recommended a responsible adult be with you for the first 24 hours after the procedure.    - No driving for 24 hours after procedure. Please arrange for someone to drive you home from the hospital today.     - Do not drive, operate machinery, or use power tools for 24 hours after your procedure.     - Do not make any legal decisions for 24 hours after your procedure.     - Do not drink alcoholic beverages for 24 hours after your procedure.    WOUND CARE   *FOR FEMORAL (LEG) ACCESS*  ·      Avoid heavy lifting (over 10 pounds) for 7 days, squatting or excessive bending for 2 days, and strenuous exercise for 7 days.  ·      No submerged bathing, swimming, or hot tubs for the next 7 days, or until fully healed.  ·      Avoid sexual activity for 3-4 days until any groin discomfort has ceased.     *FOR RADIAL (WRIST) ACCESS*  ·      No lifting more than 5 pounds or excessive use of the wrist for 24 hours - for example, treat your wrist as if it is sprained.  ·      Do not engage in vigorous activities (tennis, golf, bowling, weights) for at least 48 hours after the procedure.  ·      Do not submerge the wrist for 7 days after the  procedure.  ·      You should expect mild tingling in your hand and tenderness at the puncture site for up to 3 days.    - The transparent dressing should be removed from the site 24 hours after the procedure.  Wash the site gently with soap and water. Rinse well and pat dry. Keep the area clean and dry. You may apply a Band-Aid to the site. Avoid lotions, ointments, or powders until fully healed.     - You may shower the day after your procedure.      - It is normal to notice a small bruise around the puncture site and/or a small grape sized or smaller lump. Any large bruising or large lump warrants a call to the office.     - If bleeding should occur, lay down and apply pressure to the affected area for 10 minutes.  If the bleeding stops notify your physician.  If there is a large amount of bleeding or spurting of blood CALL 911 immediately.  DO NOT drive yourself to the hospital.    - You may experience some tenderness, bruising or minimal inflammation.  If you have any concerns, you may contact the Cath Lab or if any of these symptoms become excessive, contact your cardiologist or go to the emergency room.     OTHER INSTRUCTIONS  - You may take acetaminophen (Tylenol) as directed for discomfort.  If pain is not relieved with acetaminophen (Tylenol), contact your doctor.    - If you notice or experience any of the following, you should notify your doctor or seek medical attention  Chest pain or discomfort  Change in mental status or weakness in extremities.  Dizziness, light headedness, or feeling faint.  Change in the site where the procedure was performed, such as bleeding or an increased area of bruising or swelling.  Tingling, numbness, pain, or coolness in the leg/arm beyond the site where the procedure was performed.  Signs of infection (i.e. shaking chills, temperature > 100 degrees Fahrenheit, warmth, redness) in the leg/arm area where the procedure was performed.  Changes in urination   Bloody or black  stools  Vomiting blood  Severe nose bleeds  Any excessive bleeding    - If you DO NOT have an appointment with your cardiologist within 2-4 weeks following your procedure, please contact their office.      Lifestyle Recommendations for a Healthier Life:    The following lifestyle changes can have a significant positive impact on your overall health - helping you to achieve healthy weight, lower metabolic and heart disease risk and manage stress more effectively:      1. Eat whole, fresh foods. Food is one of the biggest drivers of our overall health.  Make your meals whole foods based, focusing on a wide variety of non starchy vegetables and fruits.  It also beneficial to include various nuts, seeds and high-quality animal proteins for overall balanced diet.    2. Remove the sweeteners from your diet.  Artificial sweeteners have a negative impact on our metabolic health - they can cause increase in both glucose and insulin, increasing the risk or potential for insulin resistance and abnormal blood sugar levels.  Artificial sweeteners are also excessively sweeter than regular sugar, they trick our taste buds into craving extreme forms of sweet, like an addiction.  I encourage you to avoid sugar, but also stevia, aspartame, sucralose, sugar alcohols like xylitol and maltitol.    3. Get rid of the inflammatory factors in your diet - this mainly comes from sugars of all kinds and refined vegetable oils.  These can increase our risk for changes in our metabolic health which can lead to diabetes, heart disease and other chronic disease.  You can reverse or combat the effective of inflammatory foods by increasing your intake of anti-inflammatory foods like wild-caught fish, fresh ground flax seed, fish oil and variety of fruits & vegetables.      4. Eat plenty of fiber!!  The standard American diet has very low levels of daily dietary fiber, many people barely get 15 grams per day.  There is plenty of nutrition research  that shows how high-fiber foods can help lower our blood sugar.   Eat a wide variety of fiber-rich plant-based foods including nuts, seeds, fruits, vegetables, and legumes.    5. Get enough sleep. Studies from experts in endocrinology & metabolism have shown that even a few nights of poor sleep can increase the risk of insulin resistance and abnormal blood sugar values. Make sleep a priority - it is an important factor in your health.  Develop a sleep routine including: avoid eating two-three hours before bed, practice relaxation techniques (warm bath, meditation, yoga, mindfulness) to help relax your muscles and prepare you for sleep.    Go to bed and wake up at consistent times.  Avoid screen time for at least 60-90 minutes before bedtime.    6. Make exercise part of your regular routine.  Exercise helps us manage blood sugar more effectively and makes our cells more receptive to the effect of the insulin our body makes (lowers blood sugar).  Regular aerobic exercise AND interval or strength training are ideal to help maintain a healthy weight, metabolism & lower the risk of chronic disease.      7. Control stress levels. Chronic stress can lead to elevated blood sugar, elevated blood pressure, accumulation of fat around the belly and these things increase the risk for metabolic syndrome, diabetes and heart disease.  We all have various levels of stress in our lives, we can't control all of it, but we can control how we respond to it and manage it's impact.  Find healthy outlets for stress management like meditation, yoga, deep breathing, or exercise.    Home BP monitoring instructions:   Goal < 130 / 80   Remain still, Avoid smoking, caffeinated beverages, or exercise within 30 min before BP measurements.  Ensure 5 min of quiet rest before BP measurements.  Sit correctly with back straight and supported (on a straight-backed dining chair, for example, rather than a sofa).  Sit with feet flat on the floor and legs  uncrossed.  Keep arm supported on a flat surface (such as a table), with the upper arm at heart level.  Bottom of the cuff should be placed directly above the bend of the elbow  Take a reading in the AM before breakfast 2-3 times / week   Record all readings accurately:  A written log should be brought to all appointments   Monitors with built-in memory should be brought to all clinic appointments and calibrated to our machines      Weight Management:  Since food equals calories, in order to lose weight you must either eat fewer calories, exercise more to burn off calories with activity, or both. Food that is not used to fuel the body is stored as fat.    A major component of losing weight is to make smarter food choices. Here's how:    Limit non-nutritious foods, such as:  Sugar, honey, syrups and candy  Pastries, donuts, pies, cakes and cookies  Soft drinks, sweetened juices and alcoholic beverages    Cut down on high-fat foods by:  Choosing poultry, fish or lean red meat  Choosing low-fat cooking methods, such as baking, broiling, steaming, grilling and boiling  Using low-fat or non-fat dairy products  Using vinaigrette, herbs, lemon or fat-free salad dressings  Avoiding fatty meats, such as niño, sausage, conte, ribs and luncheon meats  Avoiding high-fat snacks like nuts, chips and chocolate  Avoiding fried foods  Using less butter, margarine, oil and mayonnaise  Avoiding high-fat gravies, cream sauces and cream-based soups    Eat a variety of foods, including:  Fruit and vegetables that are raw, steamed or baked  Whole grains, breads, cereal, rice and pasta  Dairy products, such as low-fat or non-fat milk or yogurt, low-fat cottage cheese and low-fat cheese  Protein-rich foods like chicken, turkey, fish, lean meat and legumes, or beans    Change your eating habits:  Eat three balanced meals a day to help control your hunger  Watch portion sizes and eat small servings of a variety of foods  Choose low-calorie  snacks  Eat only when you are hungry and stop when you are satisfied  Eat slowly and try not to perform other tasks while eating  Find other activities to distract you from food, such as walking, taking up a hobby or being involved in the community  Include regular exercise in your daily routine  Find a support group, if necessary, for emotional support in your weight loss effort    Patient Education: Smoking Cessation  Making the commitment to quit smoking is one of the best choices that smokers can make for themselves, but also a difficult one. There are various opportunities for support available. Here is an overview of them.  There are various forms of nicotine replacement therapy available to assist with minimizing these symptoms. They are nicotine gum, nicotine patch, nicotine nasal spray, nicotine inhaler, and nicotine lozenge.  Which kind of nicotine replacement therapy will best work for you can be discussed with your doctor or nurse to help you understand the pros and cons of each.  Non-nicotine replacement therapy is also available. Bupropion (Wellbutrin, Zyban) is a mild anti-depressant that is also effective in helping people to quit smoking. It can be used alone or with nicotine replacement therapy.   Varenicline (Chantix) is another medication that helps people who what to quit. This medication is not a form of nicotine replacement therapy, but it helps with the withdrawal symptoms.  Studies have shown that the best success rates for people trying to quit include counseling and/or support groups such as the [employer] that is a free, confidential, 24/7, 365-day-a-year treatment referral and information service:  4-948-186-HELP (2873)    Some helpful hints include:  Avoidance. Stay away from smokers and places where you are tempted to smoke.  Activities. Exercise or do hobbies that keep your hands busy.  Alternatives. Use oral substitutes such as sugarless gum, hard candy, and carrot  sticks.  Change of habits. For example, if you usually smoke during a coffee break, then go for a walk instead.  Deep breathing. When you have the urge to smoke, do a deep breathing exercise and remind yourself why you quit.  Delay tactic. If you feel that you are about to light up, delay. Tell yourself you must wait 10 minutes. Often this simple trick will allow you to move beyond the urge  Discussion. Call a friend for support.  Drink of water. Use as an oral substitute such as water.  Many people say the reason they smoke is to deal with stress. Unfortunately, stress is a part of all our lives. When quitting, you will need to find new strategies to deal with stress. There are stress-management classes, and self-help books that can help you discover new ways to reduce and deal with stress.  The bottom line is: don't just try to go it alone-reach out for support to help you achieve your goal.

## 2024-12-13 LAB
ATRIAL RATE: 59 BPM
P AXIS: 84 DEGREES
P OFFSET: 209 MS
P ONSET: 156 MS
PR INTERVAL: 132 MS
Q ONSET: 222 MS
QRS COUNT: 10 BEATS
QRS DURATION: 84 MS
QT INTERVAL: 442 MS
QTC CALCULATION(BAZETT): 437 MS
QTC FREDERICIA: 439 MS
R AXIS: -32 DEGREES
T AXIS: 52 DEGREES
T OFFSET: 443 MS
VENTRICULAR RATE: 59 BPM

## 2024-12-20 ENCOUNTER — APPOINTMENT (OUTPATIENT)
Dept: CARDIOLOGY | Facility: CLINIC | Age: 64
End: 2024-12-20
Payer: MEDICAID

## 2024-12-20 VITALS
OXYGEN SATURATION: 99 % | DIASTOLIC BLOOD PRESSURE: 62 MMHG | WEIGHT: 182.6 LBS | BODY MASS INDEX: 27.05 KG/M2 | HEIGHT: 69 IN | SYSTOLIC BLOOD PRESSURE: 120 MMHG | HEART RATE: 59 BPM

## 2024-12-20 DIAGNOSIS — E78.5 HYPERLIPIDEMIA, UNSPECIFIED HYPERLIPIDEMIA TYPE: ICD-10-CM

## 2024-12-20 DIAGNOSIS — R55 SYNCOPE AND COLLAPSE: ICD-10-CM

## 2024-12-20 DIAGNOSIS — E11.319 TYPE 2 DIABETES MELLITUS WITH RETINOPATHY WITHOUT MACULAR EDEMA, UNSPECIFIED LATERALITY, UNSPECIFIED RETINOPATHY SEVERITY, UNSPECIFIED WHETHER LONG TERM INSULIN USE: ICD-10-CM

## 2024-12-20 DIAGNOSIS — F17.200 SMOKING: ICD-10-CM

## 2024-12-20 DIAGNOSIS — R07.89 OTHER CHEST PAIN: Primary | ICD-10-CM

## 2024-12-20 DIAGNOSIS — I25.10 CAD IN NATIVE ARTERY: ICD-10-CM

## 2024-12-20 DIAGNOSIS — I10 HYPERTENSION, UNSPECIFIED TYPE: ICD-10-CM

## 2024-12-20 PROCEDURE — 99406 BEHAV CHNG SMOKING 3-10 MIN: CPT | Performed by: STUDENT IN AN ORGANIZED HEALTH CARE EDUCATION/TRAINING PROGRAM

## 2024-12-20 PROCEDURE — 3048F LDL-C <100 MG/DL: CPT | Performed by: STUDENT IN AN ORGANIZED HEALTH CARE EDUCATION/TRAINING PROGRAM

## 2024-12-20 PROCEDURE — 3074F SYST BP LT 130 MM HG: CPT | Performed by: STUDENT IN AN ORGANIZED HEALTH CARE EDUCATION/TRAINING PROGRAM

## 2024-12-20 PROCEDURE — 99214 OFFICE O/P EST MOD 30 MIN: CPT | Performed by: STUDENT IN AN ORGANIZED HEALTH CARE EDUCATION/TRAINING PROGRAM

## 2024-12-20 PROCEDURE — 3051F HG A1C>EQUAL 7.0%<8.0%: CPT | Performed by: STUDENT IN AN ORGANIZED HEALTH CARE EDUCATION/TRAINING PROGRAM

## 2024-12-20 PROCEDURE — 3008F BODY MASS INDEX DOCD: CPT | Performed by: STUDENT IN AN ORGANIZED HEALTH CARE EDUCATION/TRAINING PROGRAM

## 2024-12-20 PROCEDURE — 93000 ELECTROCARDIOGRAM COMPLETE: CPT | Performed by: STUDENT IN AN ORGANIZED HEALTH CARE EDUCATION/TRAINING PROGRAM

## 2024-12-20 PROCEDURE — 3078F DIAST BP <80 MM HG: CPT | Performed by: STUDENT IN AN ORGANIZED HEALTH CARE EDUCATION/TRAINING PROGRAM

## 2024-12-20 NOTE — PROGRESS NOTES
No chief complaint on file.    HPI:  I was requested by Dr. Rm to evaluate this patient in consultation for cardiac assessment.     Mr. Naveed Brown is a 64 y.o. year old former smoker diabetic male patient with past medical history significant for syncope, coronary artery disease status (CAD) S/P stent (around 6 years ago),  post PCI, hypertension, diabetes, hyperlipidemia, hypothyroidism, GERD, bipolar disorder, depression, anxiety, BPH, coming for establishment of care. He endorsed a recent episode of dizziness and pre-syncopal episode while cooking. He endorses that he did not completely passed out and he remember the event. Patient not compliant with his medication. A1C 7.1%. Trop 57-66. Patient has been admitted for clinical compensation. He has been discharged uneventfully.   EKG showed bradycardic sinus rhythm with non-specific ST-T changes.   The echocardiogram showed normal LVEF 55-60% with no wall motion abnormalities.   Left Heart Catheterization (12/12/2024):  Double vessel coronary artery disease  Patent stent to prox-mid LAD  Mid RCA 99% sub occluded lesion  Preserved LV systolic function  S/P Successful PCI using one drug-eluting stent (KELLY) 2.5/38mm (post-dil 2.75mm NC balloon) guided by IVUS    Past Medical History  Past Medical History:   Diagnosis Date    Diabetes mellitus (Multi)     Dysphagia 01/30/2023    Hyperlipidemia        Past Surgical History  Past Surgical History:   Procedure Laterality Date    CARDIAC CATHETERIZATION N/A 12/12/2024    Procedure: Left Heart Cath;  Surgeon: Jerald Hill MD;  Location: [facility] Cardiac Cath Lab;  Service: Cardiovascular;  Laterality: N/A;    CARDIAC CATHETERIZATION N/A 12/12/2024    Procedure: PCI KELLY Stent- Coronary;  Surgeon: Jerald Hill MD;  Location: [facility] Cardiac Cath Lab;  Service: Cardiovascular;  Laterality: N/A;    CARDIAC CATHETERIZATION N/A 12/12/2024    Procedure: IVUS - Coronary;  Surgeon: Jerald Ventura  MD Sergio;  Location: [facility] Cardiac Cath Lab;  Service: Cardiovascular;  Laterality: N/A;    CARDIAC SURGERY      COLONOSCOPY  03/12/2020    REPEAT 3  YEARS. FINAL DIAGNOSIS A.  DECSENDING COLON POLYP, POLYPECTOMY: --TUBULAR ADENOMA.  B.  RECTAL BIOPSY: --COLONIC MUCOSA WITH PROMINENT LYMPHOID AGGREGATES.  REPEAT 3 YEARS    FINGER SURGERY      RIGHT THUMB SURGERY    OTHER SURGICAL HISTORY  07/22/2020    Bone transplantation    OTHER SURGICAL HISTORY  02/10/2020    Surgery    OTHER SURGICAL HISTORY  07/22/2020    Cardiac catheterization    SKIN GRAFT         Past Family History  Family History   Problem Relation Name Age of Onset    Brain cancer Mother      Diabetes type II Father      Alzheimer's disease Father      Alzheimer's disease Sister      Dementia Sister      Colon cancer Brother         Allergy History  Allergies   Allergen Reactions    Metformin Diarrhea       Past Social History  Social History     Socioeconomic History    Marital status: Single   Occupational History    Occupation:    Tobacco Use    Smoking status: Former     Current packs/day: 1.00     Average packs/day: 1 pack/day for 40.0 years (40.0 ttl pk-yrs)     Types: Cigarettes    Smokeless tobacco: Never   Vaping Use    Vaping status: Never Used   Substance and Sexual Activity    Alcohol use: Never    Drug use: Never     Social Drivers of Health     Financial Resource Strain: Low Risk  (10/28/2024)    Overall Financial Resource Strain (CARDIA)     Difficulty of Paying Living Expenses: Not very hard   Food Insecurity: Patient Declined (10/24/2024)    Hunger Vital Sign     Worried About Running Out of Food in the Last Year: Patient declined     Ran Out of Food in the Last Year: Patient declined   Transportation Needs: No Transportation Needs (10/28/2024)    PRAPARE - Transportation     Lack of Transportation (Medical): No     Lack of Transportation (Non-Medical): No   Physical Activity: Inactive (10/24/2024)    Exercise Vital Sign     Days  of Exercise per Week: 0 days     Minutes of Exercise per Session: 0 min   Intimate Partner Violence: Not At Risk (10/24/2024)    Humiliation, Afraid, Rape, and Kick questionnaire     Fear of Current or Ex-Partner: No     Emotionally Abused: No     Physically Abused: No     Sexually Abused: No   Housing Stability: Low Risk  (10/28/2024)    Housing Stability Vital Sign     Unable to Pay for Housing in the Last Year: No     Number of Times Moved in the Last Year: 0     Homeless in the Last Year: No       Social History     Tobacco Use   Smoking Status Former    Current packs/day: 1.00    Average packs/day: 1 pack/day for 40.0 years (40.0 ttl pk-yrs)    Types: Cigarettes   Smokeless Tobacco Never     Objective Data:  Last Recorded Vitals:  There were no vitals filed for this visit.    Last Labs:  CBC - 12/12/2024:  8:16 AM  6.9 14.3 309    43.2      CMP - 12/12/2024:  8:16 AM  8.8 6.3 17 --- 0.7   _ 4.2 24 96      PTT - No results in last year.  _   _ _     TROPHS   Date/Time Value Ref Range Status   10/26/2024 10:18 AM 56 0 - 20 ng/L Final   10/24/2024 08:38 PM 57 0 - 20 ng/L Final     Comment:     Previous result verified on 10/24/2024 2008 on specimen/case [id number] called with component [facility] for procedure Troponin I, High Sensitivity, Initial with value 66 ng/L.   10/24/2024 07:30 PM 66 0 - 20 ng/L Final     HGBA1C   Date/Time Value Ref Range Status   11/20/2024 10:45 AM 7.2 See comment % Final   10/24/2024 07:30 PM 7.1 See comment % Final     LDLCALC   Date/Time Value Ref Range Status   11/20/2024 10:45 AM 56 <=99 mg/dL Final     Comment:                                 Near   Borderline      AGE      Desirable  Optimal    High     High     Very High     0-19 Y     0 - 109     ---    110-129   >/= 130     ----    20-24 Y     0 - 119     ---    120-159   >/= 160     ----      >24 Y     0 -  99   100-129  130-159   160-189     >/=190     10/24/2024 07:30 PM 38 <=99 mg/dL Final     Comment:                                  Near   Borderline      AGE      Desirable  Optimal    High     High     Very High     0-19 Y     0 - 109     ---    110-129   >/= 130     ----    20-24 Y     0 - 119     ---    120-159   >/= 160     ----      >24 Y     0 -  99   100-129  130-159   160-189     >/=190       VLDL   Date/Time Value Ref Range Status   11/20/2024 10:45 AM 15 0 - 40 mg/dL Final   10/24/2024 07:30 PM 22 0 - 40 mg/dL Final   03/13/2023 10:40 AM 18 0 - 40 mg/dL Final   04/22/2021 10:10 AM 12 0 - 40 mg/dL Final   02/20/2020 08:55 AM 19 0 - 40 mg/dL Final        Patient Medications:  Outpatient Encounter Medications as of 12/20/2024   Medication Sig Dispense Refill    ARIPiprazole (Abilify) 20 mg tablet Take 1 tablet (20 mg) by mouth once daily. 90 tablet 1    aspirin 81 mg chewable tablet Chew 1 tablet (81 mg) once daily. 90 tablet 3    atorvastatin (Lipitor) 40 mg tablet Take 1 tablet (40 mg) by mouth once daily at bedtime. 90 tablet 3    cholecalciferol (Vitamin D3) 50 MCG (2000 UT) tablet Take 1 tablet (50 mcg) by mouth once daily. 90 tablet 3    clopidogrel (Plavix) 75 mg tablet Take 1 tablet (75 mg) by mouth once daily. 90 tablet 3    empagliflozin (Jardiance) 10 mg Take 1 tablet (10 mg) by mouth once daily. 90 tablet 3    FLUoxetine (PROzac) 20 mg capsule Take 1 capsule (20 mg) by mouth once daily. 90 capsule 1    levothyroxine (Synthroid, Levoxyl) 25 mcg tablet Take 1 tablet (25 mcg) by mouth once daily. 90 tablet 3    nitroglycerin (Nitrostat) 0.4 mg SL tablet Place 1 tablet (0.4 mg) under the tongue every 5 minutes if needed for chest pain. 25 tablet 0    OneTouch Ultra Test strip        No facility-administered encounter medications on file as of 12/20/2024.       Physical Exam:  General: alert, oriented and in no acute distress  HEENT: NC/AT; EOMI; PERRLA, external ear is normal  Neck: supple; trachea midline; no masses; no JVD  Chest: clear breath sounds bilaterally; no wheezing  Cardio: regular rhythm, S1S2 normal, no  murmurs  Abdomen: Soft, non-tender, non-distension, no organomegaly  Extremities: no clubbing/cyanosis/edema. Good healing access site  Neuro: Grossly intact     Psychiatric: Normal mood and affect     Past Cardiology Results (Last 3 Years):  EKG:  Electrocardiogram 12 Lead 12/12/2024      ECG 12 lead (Clinic Performed) 11/19/2024      ECG 12 lead 10/25/2024      ECG 12 Lead 10/24/2024    Echo:  Echo Results:  Transthoracic Echo (TTE) Complete With Contrast 10/25/2024    [place] [address]  Phone [phone], Fax [phone]    TRANSTHORACIC ECHOCARDIOGRAM REPORT    Patient Name:      DIAMOND GARCIA    Reading Physician:    [id number] Jonathan Geiger MD  Study Date:        10/25/2024           Ordering Provider:    [id number] ELSI BENAVIDES  MRN/PID:           [id number]             Fellow:  Accession#:        [id number]         Nurse:                Sandra Drew RN  Date of Birth/Age: 1960 / 63      Sonographer:          Marcela Gordillo RVT,  years                                      RCS  Gender:            M                    Additional Staff:  Height:            175.26 cm            Admit Date:           10/24/2024  Weight:            40.82 kg             Admission Status:     Inpatient -  Routine  BSA / BMI:         1.47 m2 / 13.29      Department Location:  [address]  kg/m2  Blood Pressure: 126 /57 mmHg    Study Type:    TRANSTHORACIC ECHO (TTE) COMPLETE  Diagnosis/ICD: Syncope-R55  Indication:    Syncope  CPT Codes:     Echo Complete w Full [id number]    Patient History:  Pertinent History: No previous echo.    Study Detail: The following Echo studies were performed: 2D, M-Mode, Doppler and  color flow. Definity used as a contrast agent for endocardial  border definition and agitated saline used as a contrast agent for  intraseptal flow evaluation. Total contrast used for this  procedure was 2 mL via IV push. The patient was  awake.      PHYSICIAN INTERPRETATION:  Left Ventricle: Left ventricular ejection fraction is normal, by visual estimate at 55-60%. There are no regional wall motion abnormalities. The left ventricular cavity size is normal. Spectral Doppler shows a normal pattern of left ventricular diastolic filling.  Left Atrium: The left atrium is normal in size. A bubble study using agitated saline was performed. Bubble study is negative.  Right Ventricle: The right ventricle is normal in size. There is normal right ventricular global systolic function.  Right Atrium: The right atrium is normal in size.  Aortic Valve: The aortic valve was not well visualized. The aortic valve dimensionless index is 0.60. There is no evidence of aortic valve regurgitation. The peak instantaneous gradient of the aortic valve is 12.7 mmHg. The mean gradient of the aortic valve is 5.0 mmHg.  Mitral Valve: The mitral valve is normal in structure. There is no evidence of mitral valve regurgitation.  Tricuspid Valve: The tricuspid valve is structurally normal. No evidence of tricuspid regurgitation.  Pulmonic Valve: The pulmonic valve is not well visualized. There is no indication of pulmonic valve regurgitation.  Pericardium: No pericardial effusion noted.  Aorta: The aortic root is normal.  Systemic Veins: The inferior vena cava appears normal in size, with IVC inspiratory collapse greater than 50%.      CONCLUSIONS:  1. Left ventricular ejection fraction is normal, by visual estimate at 55-60%.  2. There is normal right ventricular global systolic function.    QUANTITATIVE DATA SUMMARY:    2D MEASUREMENTS:         Normal Ranges:  Ao Root d:       2.60 cm (2.0-3.7cm)      LA VOLUME:                    Normal Ranges:  LA Vol A4C:        26.5 ml    (22+/-6mL/m2)  LA Vol A2C:        39.7 ml  LA Vol BP:         32.5 ml  LA Vol Index A4C:  18.0ml/m2  LA Vol Index A2C:  27.0 ml/m2  LA Vol Index BP:   22.1 ml/m2  LA Area A4C:       12.7 cm2  LA Area A2C:        15.5 cm2  LA Major Axis A4C: 5.2 cm  LA Major Axis A2C: 5.2 cm  LA Volume Index:   26.4 ml/m2  LA Vol A4C:        26.1 ml  LA Vol A2C:        38.8 ml  LA Vol Index BSA:  22.1 ml/m2      M-MODE MEASUREMENTS:         Normal Ranges:  AoV Exc:             2.00 cm (1.5-2.5cm)      AORTA MEASUREMENTS:         Normal Ranges:  AoV Exc:            2.00 cm (1.5-2.5cm)      LV SYSTOLIC FUNCTION BY 2D PLANIMETRY (MOD):  Normal Ranges:  EF-A4C View:    58 % (>=55%)  EF-A2C View:    46 %  EF-Biplane:     53 %  EF-Visual:      58 %  LV EF Reported: 58 %      LV DIASTOLIC FUNCTION:           Normal Ranges:  MV Peak E:             0.99 m/s  (0.7-1.2 m/s)  MV Peak A:             1.15 m/s  (0.42-0.7 m/s)  E/A Ratio:             0.86      (1.0-2.2)  MV e'                  0.112 m/s (>8.0)  MV lateral e'          0.12 m/s  MV medial e'           0.11 m/s  E/e' Ratio:            8.84      (<8.0)      MITRAL VALVE:          Normal Ranges:  MV DT:        225 msec (150-240msec)      AORTIC VALVE:                      Normal Ranges:  AoV Vmax:                1.78 m/s  (<=1.7m/s)  AoV Peak P.7 mmHg (<20mmHg)  AoV Mean P.0 mmHg  (1.7-11.5mmHg)  LVOT Max Faustino:            1.08 m/s  (<=1.1m/s)  AoV VTI:                 39.60 cm  (18-25cm)  LVOT VTI:                23.80 cm  LVOT Diameter:           2.00 cm   (1.8-2.4cm)  AoV Area, VTI:           1.89 cm2  (2.5-5.5cm2)  AoV Area,Vmax:           1.91 cm2  (2.5-4.5cm2)  AoV Dimensionless Index: 0.60      RIGHT VENTRICLE:  RV Basal 3.19 cm  RV Mid   2.05 cm  RV Major 5.0 cm  TAPSE:   24.8 mm      TRICUSPID VALVE/RVSP:          Normal Ranges:  Peak TR Velocity:     2.38 m/s  RV Syst Pressure:     26 mmHg  (< 30mmHg)      PULMONIC VALVE:          Normal Ranges:  PV Accel Time:  134 msec (>120ms)  PV Max Faustino:     1.1 m/s  (0.6-0.9m/s)  PV Max P.1 mmHg      [id number] Jonathan Geiger MD  Electronically signed on 10/25/2024 at 9:21:42 AM        ** Final **      Cath:  Cardiac Catheterization Procedure 12/12/2024    CV NCDR CATHPCI V5 COLLECTION FORM   Stress Test:  No results found for this or any previous visit from the past 1095 days.    Cardiac Imaging:  No results found for this or any previous visit from the past 1095 days.       Assessment/Plan     Mr. Naveed Brown is a 64 y.o. year old former smoker diabetic male patient with past medical history significant for syncope, coronary artery disease status (CAD) S/P stent (around 6 years ago),  post PCI, hypertension, diabetes, hyperlipidemia, hypothyroidism, GERD, bipolar disorder, depression, anxiety, BPH, coming for establishment of care. He endorsed a recent episode of dizziness and pre-syncopal episode while cooking. He endorses that he did not completely passed out and he remember the event. Patient not compliant with his medication. A1C 7.1%. Trop 57-66. Patient has been admitted for clinical compensation. He has been discharged uneventfully.      Assessment     # Syncope / CAD S/P Stent (6 years ago)  - Trop 57 - 66.  - He endorsed a recent episode of dizziness and pre-syncopal episode while cooking. He endorses that he did not completely passed out and he remember the event. Patient not compliant with his medication.   - EKG showed bradycardic sinus rhythm with non-specific ST-T changes.   - Echocardiogram (10/25/2024) showed normal LVEF 55-60% with no wall motion abnormalities.   - US duplex carotids:  Right Carotid: Findings are consistent with less than 50% stenosis of the right proximal internal carotid artery. Laminar flow seen by color Doppler. Right external carotid artery appears patent with no evidence of stenosis. No evidence of hemodynamically significant stenosis of the right common carotid artery. The right vertebral artery is patent with antegrade flow. No evidence of hemodynamically significant stenosis in the right subclavian artery.  Left Carotid: Findings are consistent with less than  50% stenosis of the left proximal internal carotid artery. Laminar flow seen by color Doppler. Left external carotid artery appears patent with no evidence of stenosis. No evidence of hemodynamically significant stenosis of the left common carotid artery. The left vertebral artery is patent with antegrade flow. There are elevated velocities in the left subclavian artery that are suggestive of disease.  -7-day holter monitor:  *The predominant rhythm was sinus.  *The Maximum Heart Rate recorded was 170 bpm, 11/02 14:17:59, the Minimum Heart Rate recorded was 42bpm, 11/03 00:58:21, and the Average Heart Rate was 61 bpm.  *There were 131 VE beats with a burden of <1 %.  *There were 1,331 SVE beats with a burden of <1 %. There were 12 occurrences of Supraventricular Tachycardia with the Fastest episode 170 bpm, 11/02 14:17:58, and the Longest episode 15 beats, 10/29 05:52:33.  *Other rhythms in this study include: Ventricular Run.  *There was 1 Patient Trigger.  - Would suggest thyroid function assessment by PCP team.  The echocardiogram showed normal LVEF 55-60% with no wall motion abnormalities.   Left Heart Catheterization (12/12/2024):  Double vessel coronary artery disease  Patent stent to prox-mid LAD  Mid RCA 99% sub occluded lesion  Preserved LV systolic function  S/P Successful PCI using one drug-eluting stent (KELLY) 2.5/38mm (post-dil 2.75mm NC balloon) guided by IVUS  - Would suggest to keep conservative treatment and guideline-directed medical therapy (GDMT) for the remaining lesions.   - Cardiac rehab.   - Would suggest to keep ASA, Clopidogrel, high intensity statins - Atorvastatin 40mg daily.  - Follow up in 3 months.     # Diabetes  - Counseled on healthy diet and regular exercises.  - Discussed need for weight loss and the benefits.   - Keep current medications including Metformin.     # Hyperlipidemia  - Controlled by PCP.  - Keep home medication with Atorvastatin 40mg daily.  - Counseled on healthy  diet and regular exercise.      # Hypothyroidism  - Would suggest  - Keep Levothyroxine daily      # Nicotine dependence  - We had a thorough conversation with the patient (~3min) addressing the hazard risks for smoking, including but not limited to heart attack, stroke and cancer. He has stopped smoking one year ago.     We have discussed the most common side effects of the prescribed medications, indications, drug interactions, risks, complications, and alternatives of medications/therapeutics were explained and discussed. The patient has been requested to monitor closely for any untoward side effects or complications of medications. The patient has been strongly advised to be compliant with the recommendations, all the questions and concerns have been addressed. The patient has been also instructed to call, to return sooner or to go to the emergency department if symptoms persist or get worsen. The patient voiced understanding and denies any further questions at this time.      This note was transcribed using the Dragon Dictation system. There may be grammatical, punctuation, or verbiage errors that occur with voice recognition programs.     Counseling greater than 50% of visit regarding all cardiac issues.     Thank you, Dr. Rm, for allowing me to participate in the care of this patient. Please do not hesitate to contact me with any further questions or concerns.     Jerald Hill MD  Cardiology

## 2025-01-14 ENCOUNTER — PATIENT OUTREACH (OUTPATIENT)
Dept: PRIMARY CARE | Facility: CLINIC | Age: 65
End: 2025-01-14
Payer: MEDICAID

## 2025-01-14 NOTE — PROGRESS NOTES
90 day post hospital discharge follow up call complete. Spoke with pt emergency contact Cassandra who reports pt is back to himself. She denies any current needs from pt PCP and reports pt has all of her medication as far as she knows. Cassandra reports pt has everything he needs at home.    -Verified pt next PCP appt 5/27/2025 1420; Cassandra aware of labwork to be drawn before appt.    Cassandra denies any questions, needs, or concerns at this time. She is encouraged to call if questions or needs arise.

## 2025-01-15 LAB — BODY SURFACE AREA: 2.02 M2

## 2025-02-06 DIAGNOSIS — E11.69 MIXED HYPERLIPIDEMIA DUE TO TYPE 2 DIABETES MELLITUS (MULTI): ICD-10-CM

## 2025-02-06 DIAGNOSIS — E78.2 MIXED HYPERLIPIDEMIA DUE TO TYPE 2 DIABETES MELLITUS (MULTI): ICD-10-CM

## 2025-02-06 RX ORDER — ATORVASTATIN CALCIUM 40 MG/1
40 TABLET, FILM COATED ORAL NIGHTLY
Qty: 90 TABLET | Refills: 0 | Status: SHIPPED | OUTPATIENT
Start: 2025-02-06

## 2025-03-17 DIAGNOSIS — I25.10 CAD IN NATIVE ARTERY: ICD-10-CM

## 2025-03-17 DIAGNOSIS — E03.9 HYPOTHYROIDISM, UNSPECIFIED TYPE: ICD-10-CM

## 2025-03-18 ENCOUNTER — APPOINTMENT (OUTPATIENT)
Dept: CARDIOLOGY | Facility: CLINIC | Age: 65
End: 2025-03-18
Payer: MEDICAID

## 2025-03-18 VITALS
HEART RATE: 63 BPM | BODY MASS INDEX: 28.22 KG/M2 | SYSTOLIC BLOOD PRESSURE: 120 MMHG | OXYGEN SATURATION: 99 % | HEIGHT: 69 IN | DIASTOLIC BLOOD PRESSURE: 68 MMHG | WEIGHT: 190.5 LBS

## 2025-03-18 DIAGNOSIS — E78.5 HYPERLIPIDEMIA, UNSPECIFIED HYPERLIPIDEMIA TYPE: ICD-10-CM

## 2025-03-18 DIAGNOSIS — E11.319 TYPE 2 DIABETES MELLITUS WITH RETINOPATHY WITHOUT MACULAR EDEMA, UNSPECIFIED LATERALITY, UNSPECIFIED RETINOPATHY SEVERITY, UNSPECIFIED WHETHER LONG TERM INSULIN USE: ICD-10-CM

## 2025-03-18 DIAGNOSIS — R07.89 OTHER CHEST PAIN: ICD-10-CM

## 2025-03-18 DIAGNOSIS — R55 SYNCOPE AND COLLAPSE: ICD-10-CM

## 2025-03-18 DIAGNOSIS — I25.10 CAD IN NATIVE ARTERY: Primary | ICD-10-CM

## 2025-03-18 DIAGNOSIS — I10 HYPERTENSION, UNSPECIFIED TYPE: ICD-10-CM

## 2025-03-18 DIAGNOSIS — F17.200 SMOKING: ICD-10-CM

## 2025-03-18 PROCEDURE — 99406 BEHAV CHNG SMOKING 3-10 MIN: CPT | Performed by: STUDENT IN AN ORGANIZED HEALTH CARE EDUCATION/TRAINING PROGRAM

## 2025-03-18 PROCEDURE — 1036F TOBACCO NON-USER: CPT | Performed by: STUDENT IN AN ORGANIZED HEALTH CARE EDUCATION/TRAINING PROGRAM

## 2025-03-18 PROCEDURE — 3008F BODY MASS INDEX DOCD: CPT | Performed by: STUDENT IN AN ORGANIZED HEALTH CARE EDUCATION/TRAINING PROGRAM

## 2025-03-18 PROCEDURE — 99214 OFFICE O/P EST MOD 30 MIN: CPT | Performed by: STUDENT IN AN ORGANIZED HEALTH CARE EDUCATION/TRAINING PROGRAM

## 2025-03-18 PROCEDURE — 3074F SYST BP LT 130 MM HG: CPT | Performed by: STUDENT IN AN ORGANIZED HEALTH CARE EDUCATION/TRAINING PROGRAM

## 2025-03-18 PROCEDURE — 3078F DIAST BP <80 MM HG: CPT | Performed by: STUDENT IN AN ORGANIZED HEALTH CARE EDUCATION/TRAINING PROGRAM

## 2025-03-18 RX ORDER — LEVOTHYROXINE SODIUM 25 UG/1
25 TABLET ORAL DAILY
Qty: 90 TABLET | Refills: 3 | Status: SHIPPED | OUTPATIENT
Start: 2025-03-18

## 2025-03-18 RX ORDER — CLOPIDOGREL BISULFATE 75 MG/1
75 TABLET ORAL DAILY
Qty: 90 TABLET | Refills: 3 | Status: SHIPPED | OUTPATIENT
Start: 2025-03-18

## 2025-03-18 NOTE — PROGRESS NOTES
Chief Complaint   Patient presents with    Follow-up     3 month     HPI:  I was requested by Dr. Rm to evaluate this patient in consultation for cardiac assessment.     Mr. Naveed Brown is a 64 y.o. year old former smoker diabetic male patient with past medical history significant for syncope, coronary artery disease status (CAD) S/P stent (around 6 years ago),  post PCI, hypertension, diabetes, hyperlipidemia, hypothyroidism, GERD, bipolar disorder, depression, anxiety, BPH, coming for establishment of care. He endorsed a recent episode of dizziness and pre-syncopal episode while cooking. He endorses that he did not completely passed out and he remember the event. Patient not compliant with his medication. A1C 7.1%. Trop 57-66. Patient has been admitted for clinical compensation. He has been discharged uneventfully.   EKG showed bradycardic sinus rhythm with non-specific ST-T changes.   The echocardiogram showed normal LVEF 55-60% with no wall motion abnormalities.   Left Heart Catheterization (12/12/2024):  Double vessel coronary artery disease  Patent stent to prox-mid LAD  Mid RCA 99% sub occluded lesion  Preserved LV systolic function  S/P Successful PCI using one drug-eluting stent (KELLY) 2.5/38mm (post-dil 2.75mm NC balloon) guided by IVUS    Past Medical History  Past Medical History:   Diagnosis Date    Diabetes mellitus (Multi)     Dysphagia 01/30/2023    Hyperlipidemia        Past Surgical History  Past Surgical History:   Procedure Laterality Date    CARDIAC CATHETERIZATION N/A 12/12/2024    Procedure: Left Heart Cath;  Surgeon: Jerald Hill MD;  Location: [facility] Cardiac Cath Lab;  Service: Cardiovascular;  Laterality: N/A;    CARDIAC CATHETERIZATION N/A 12/12/2024    Procedure: PCI KELLY Stent- Coronary;  Surgeon: Jerald Hill MD;  Location: [facility] Cardiac Cath Lab;  Service: Cardiovascular;  Laterality: N/A;    CARDIAC CATHETERIZATION N/A 12/12/2024    Procedure: IVUS -  Coronary;  Surgeon: Jerald Hill MD;  Location: [facility] Cardiac Cath Lab;  Service: Cardiovascular;  Laterality: N/A;    CARDIAC SURGERY      COLONOSCOPY  03/12/2020    REPEAT 3  YEARS. FINAL DIAGNOSIS A.  DECSENDING COLON POLYP, POLYPECTOMY: --TUBULAR ADENOMA.  B.  RECTAL BIOPSY: --COLONIC MUCOSA WITH PROMINENT LYMPHOID AGGREGATES.  REPEAT 3 YEARS    FINGER SURGERY      RIGHT THUMB SURGERY    OTHER SURGICAL HISTORY  07/22/2020    Bone transplantation    OTHER SURGICAL HISTORY  02/10/2020    Surgery    OTHER SURGICAL HISTORY  07/22/2020    Cardiac catheterization    SKIN GRAFT         Past Family History  Family History   Problem Relation Name Age of Onset    Brain cancer Mother      Diabetes type II Father      Alzheimer's disease Father      Alzheimer's disease Sister      Dementia Sister      Colon cancer Brother         Allergy History  Allergies   Allergen Reactions    Metformin Diarrhea       Past Social History  Social History     Socioeconomic History    Marital status: Single   Occupational History    Occupation:    Tobacco Use    Smoking status: Former     Current packs/day: 1.00     Average packs/day: 1 pack/day for 40.0 years (40.0 ttl pk-yrs)     Types: Cigarettes    Smokeless tobacco: Never   Vaping Use    Vaping status: Never Used   Substance and Sexual Activity    Alcohol use: Never    Drug use: Never     Social Drivers of Health     Financial Resource Strain: Low Risk  (10/28/2024)    Overall Financial Resource Strain (CARDIA)     Difficulty of Paying Living Expenses: Not very hard   Food Insecurity: Patient Declined (10/24/2024)    Hunger Vital Sign     Worried About Running Out of Food in the Last Year: Patient declined     Ran Out of Food in the Last Year: Patient declined   Transportation Needs: No Transportation Needs (10/28/2024)    PRAPARE - Transportation     Lack of Transportation (Medical): No     Lack of Transportation (Non-Medical): No   Physical Activity: Inactive  "(10/24/2024)    Exercise Vital Sign     Days of Exercise per Week: 0 days     Minutes of Exercise per Session: 0 min   Intimate Partner Violence: Not At Risk (10/24/2024)    Humiliation, Afraid, Rape, and Kick questionnaire     Fear of Current or Ex-Partner: No     Emotionally Abused: No     Physically Abused: No     Sexually Abused: No   Housing Stability: Low Risk  (10/28/2024)    Housing Stability Vital Sign     Unable to Pay for Housing in the Last Year: No     Number of Times Moved in the Last Year: 0     Homeless in the Last Year: No       Social History     Tobacco Use   Smoking Status Former    Current packs/day: 1.00    Average packs/day: 1 pack/day for 40.0 years (40.0 ttl pk-yrs)    Types: Cigarettes   Smokeless Tobacco Never       Objective Data:  Last Recorded Vitals:  Vitals:    03/18/25 1432   BP: 120/68   Pulse: 63   SpO2: 99%   Weight: 86.4 kg (190 lb 8 oz)   Height: 1.753 m (5' 9\")       Last Labs:  CBC - 12/12/2024:  8:16 AM  6.9 14.3 309    43.2      CMP - 12/12/2024:  8:16 AM  8.8 6.3 17 --- 0.7   _ 4.2 24 96      PTT - No results in last year.  _   _ _     TROPHS   Date/Time Value Ref Range Status   10/26/2024 10:18 AM 56 0 - 20 ng/L Final   10/24/2024 08:38 PM 57 0 - 20 ng/L Final     Comment:     Previous result verified on 10/24/2024 2008 on specimen/case [id number] called with component [facility] for procedure Troponin I, High Sensitivity, Initial with value 66 ng/L.   10/24/2024 07:30 PM 66 0 - 20 ng/L Final     HGBA1C   Date/Time Value Ref Range Status   11/20/2024 10:45 AM 7.2 See comment % Final   10/24/2024 07:30 PM 7.1 See comment % Final     LDLCALC   Date/Time Value Ref Range Status   11/20/2024 10:45 AM 56 <=99 mg/dL Final     Comment:                                 Near   Borderline      AGE      Desirable  Optimal    High     High     Very High     0-19 Y     0 - 109     ---    110-129   >/= 130     ----    20-24 Y     0 - 119     ---    120-159   >/= 160     ----      >24 Y    "  0 -  99   100-129  130-159   160-189     >/=190     10/24/2024 07:30 PM 38 <=99 mg/dL Final     Comment:                                 Near   Borderline      AGE      Desirable  Optimal    High     High     Very High     0-19 Y     0 - 109     ---    110-129   >/= 130     ----    20-24 Y     0 - 119     ---    120-159   >/= 160     ----      >24 Y     0 -  99   100-129  130-159   160-189     >/=190       VLDL   Date/Time Value Ref Range Status   11/20/2024 10:45 AM 15 0 - 40 mg/dL Final   10/24/2024 07:30 PM 22 0 - 40 mg/dL Final   03/13/2023 10:40 AM 18 0 - 40 mg/dL Final   04/22/2021 10:10 AM 12 0 - 40 mg/dL Final   02/20/2020 08:55 AM 19 0 - 40 mg/dL Final        Patient Medications:  Outpatient Encounter Medications as of 3/18/2025   Medication Sig Dispense Refill    ARIPiprazole (Abilify) 20 mg tablet Take 1 tablet (20 mg) by mouth once daily. 90 tablet 1    aspirin 81 mg chewable tablet Chew 1 tablet (81 mg) once daily. 90 tablet 3    atorvastatin (Lipitor) 40 mg tablet Take 1 tablet (40 mg) by mouth once daily at bedtime. 90 tablet 0    cholecalciferol (Vitamin D3) 50 MCG (2000 UT) tablet Take 1 tablet (50 mcg) by mouth once daily. 90 tablet 3    clopidogrel (Plavix) 75 mg tablet Take 1 tablet (75 mg) by mouth once daily. 90 tablet 3    empagliflozin (Jardiance) 10 mg Take 1 tablet (10 mg) by mouth once daily. 90 tablet 3    FLUoxetine (PROzac) 20 mg capsule Take 1 capsule (20 mg) by mouth once daily. 90 capsule 1    levothyroxine (Synthroid, Levoxyl) 25 mcg tablet Take 1 tablet (25 mcg) by mouth once daily. 90 tablet 3    nitroglycerin (Nitrostat) 0.4 mg SL tablet Place 1 tablet (0.4 mg) under the tongue every 5 minutes if needed for chest pain. 25 tablet 0    OneTouch Ultra Test strip       [DISCONTINUED] clopidogrel (Plavix) 75 mg tablet Take 1 tablet (75 mg) by mouth once daily. 90 tablet 3    [DISCONTINUED] levothyroxine (Synthroid, Levoxyl) 25 mcg tablet Take 1 tablet (25 mcg) by mouth once daily. 90  tablet 3     No facility-administered encounter medications on file as of 3/18/2025.       Physical Exam:  General: alert, oriented and in no acute distress  HEENT: NC/AT; EOMI; PERRLA, external ear is normal  Neck: supple; trachea midline; no masses; no JVD  Chest: clear breath sounds bilaterally; no wheezing  Cardio: regular rhythm, S1S2 normal, no murmurs  Abdomen: Soft, non-tender, non-distension, no organomegaly  Extremities: no clubbing/cyanosis/edema. Good healing access site  Neuro: Grossly intact     Psychiatric: Normal mood and affect     Past Cardiology Results (Last 3 Years):  EKG:  ECG 12 lead (Clinic Performed) 12/20/2024      Electrocardiogram 12 Lead 12/12/2024      ECG 12 lead (Clinic Performed) 11/19/2024      ECG 12 lead 10/25/2024      ECG 12 Lead 10/24/2024    Echo:  Echo Results:  Transthoracic Echo (TTE) Complete With Contrast 10/25/2024    [place] [address]  Phone [phone], Fax [phone]    TRANSTHORACIC ECHOCARDIOGRAM REPORT    Patient Name:      DIAMOND HUGGINS RADHA    Reading Physician:    [id number] Jonathan Geiger MD  Study Date:        10/25/2024           Ordering Provider:    [id number] ELSI BENAVIDES  MRN/PID:           [id number]             Fellow:  Accession#:        [id number]         Nurse:                Sandra Drew RN  Date of Birth/Age: 1960 / 63      Sonographer:          Marcela Gordillo RVT,  years                                      RCS  Gender:            M                    Additional Staff:  Height:            175.26 cm            Admit Date:           10/24/2024  Weight:            40.82 kg             Admission Status:     Inpatient -  Routine  BSA / BMI:         1.47 m2 / 13.29      Department Location:  [address]  kg/m2  Blood Pressure: 126 /57 mmHg    Study Type:    TRANSTHORACIC ECHO (TTE) COMPLETE  Diagnosis/ICD: Syncope-R55  Indication:    Syncope  CPT Codes:     Echo Complete w Full  [id number]    Patient History:  Pertinent History: No previous echo.    Study Detail: The following Echo studies were performed: 2D, M-Mode, Doppler and  color flow. Definity used as a contrast agent for endocardial  border definition and agitated saline used as a contrast agent for  intraseptal flow evaluation. Total contrast used for this  procedure was 2 mL via IV push. The patient was awake.      PHYSICIAN INTERPRETATION:  Left Ventricle: Left ventricular ejection fraction is normal, by visual estimate at 55-60%. There are no regional wall motion abnormalities. The left ventricular cavity size is normal. Spectral Doppler shows a normal pattern of left ventricular diastolic filling.  Left Atrium: The left atrium is normal in size. A bubble study using agitated saline was performed. Bubble study is negative.  Right Ventricle: The right ventricle is normal in size. There is normal right ventricular global systolic function.  Right Atrium: The right atrium is normal in size.  Aortic Valve: The aortic valve was not well visualized. The aortic valve dimensionless index is 0.60. There is no evidence of aortic valve regurgitation. The peak instantaneous gradient of the aortic valve is 12.7 mmHg. The mean gradient of the aortic valve is 5.0 mmHg.  Mitral Valve: The mitral valve is normal in structure. There is no evidence of mitral valve regurgitation.  Tricuspid Valve: The tricuspid valve is structurally normal. No evidence of tricuspid regurgitation.  Pulmonic Valve: The pulmonic valve is not well visualized. There is no indication of pulmonic valve regurgitation.  Pericardium: No pericardial effusion noted.  Aorta: The aortic root is normal.  Systemic Veins: The inferior vena cava appears normal in size, with IVC inspiratory collapse greater than 50%.      CONCLUSIONS:  1. Left ventricular ejection fraction is normal, by visual estimate at 55-60%.  2. There is normal right ventricular global systolic  function.    QUANTITATIVE DATA SUMMARY:    2D MEASUREMENTS:         Normal Ranges:  Ao Root d:       2.60 cm (2.0-3.7cm)      LA VOLUME:                    Normal Ranges:  LA Vol A4C:        26.5 ml    (22+/-6mL/m2)  LA Vol A2C:        39.7 ml  LA Vol BP:         32.5 ml  LA Vol Index A4C:  18.0ml/m2  LA Vol Index A2C:  27.0 ml/m2  LA Vol Index BP:   22.1 ml/m2  LA Area A4C:       12.7 cm2  LA Area A2C:       15.5 cm2  LA Major Axis A4C: 5.2 cm  LA Major Axis A2C: 5.2 cm  LA Volume Index:   26.4 ml/m2  LA Vol A4C:        26.1 ml  LA Vol A2C:        38.8 ml  LA Vol Index BSA:  22.1 ml/m2      M-MODE MEASUREMENTS:         Normal Ranges:  AoV Exc:             2.00 cm (1.5-2.5cm)      AORTA MEASUREMENTS:         Normal Ranges:  AoV Exc:            2.00 cm (1.5-2.5cm)      LV SYSTOLIC FUNCTION BY 2D PLANIMETRY (MOD):  Normal Ranges:  EF-A4C View:    58 % (>=55%)  EF-A2C View:    46 %  EF-Biplane:     53 %  EF-Visual:      58 %  LV EF Reported: 58 %      LV DIASTOLIC FUNCTION:           Normal Ranges:  MV Peak E:             0.99 m/s  (0.7-1.2 m/s)  MV Peak A:             1.15 m/s  (0.42-0.7 m/s)  E/A Ratio:             0.86      (1.0-2.2)  MV e'                  0.112 m/s (>8.0)  MV lateral e'          0.12 m/s  MV medial e'           0.11 m/s  E/e' Ratio:            8.84      (<8.0)      MITRAL VALVE:          Normal Ranges:  MV DT:        225 msec (150-240msec)      AORTIC VALVE:                      Normal Ranges:  AoV Vmax:                1.78 m/s  (<=1.7m/s)  AoV Peak P.7 mmHg (<20mmHg)  AoV Mean P.0 mmHg  (1.7-11.5mmHg)  LVOT Max Faustino:            1.08 m/s  (<=1.1m/s)  AoV VTI:                 39.60 cm  (18-25cm)  LVOT VTI:                23.80 cm  LVOT Diameter:           2.00 cm   (1.8-2.4cm)  AoV Area, VTI:           1.89 cm2  (2.5-5.5cm2)  AoV Area,Vmax:           1.91 cm2  (2.5-4.5cm2)  AoV Dimensionless Index: 0.60      RIGHT VENTRICLE:  RV Basal 3.19 cm  RV Mid   2.05 cm  RV  Major 5.0 cm  TAPSE:   24.8 mm      TRICUSPID VALVE/RVSP:          Normal Ranges:  Peak TR Velocity:     2.38 m/s  RV Syst Pressure:     26 mmHg  (< 30mmHg)      PULMONIC VALVE:          Normal Ranges:  PV Accel Time:  134 msec (>120ms)  PV Max Faustino:     1.1 m/s  (0.6-0.9m/s)  PV Max P.1 mmHg      [id number] Jonathan Geiger MD  Electronically signed on 10/25/2024 at 9:21:42 AM        ** Final **     Cath:  Cardiac Catheterization Procedure 2024    CV NCDR CATHPCI V5 COLLECTION FORM   Stress Test:  No results found for this or any previous visit from the past 1095 days.    Cardiac Imaging:  No results found for this or any previous visit from the past 1095 days.       Assessment/Plan     Mr. Naveed Brown is a 64 y.o. year old former smoker diabetic male patient with past medical history significant for syncope, coronary artery disease status (CAD) S/P stent (around 6 years ago),  post PCI, hypertension, diabetes, hyperlipidemia, hypothyroidism, GERD, bipolar disorder, depression, anxiety, BPH, coming for establishment of care. He endorsed a recent episode of dizziness and pre-syncopal episode while cooking. He endorses that he did not completely passed out and he remember the event. Patient not compliant with his medication. A1C 7.1%. Trop 57-66. Patient has been admitted for clinical compensation. He has been discharged uneventfully.      Assessment     # Syncope / CAD S/P Stent (6 years ago)  - Trop 57 - 66.  - He endorsed a recent episode of dizziness and pre-syncopal episode while cooking. He endorses that he did not completely passed out and he remember the event. Patient not compliant with his medication.   - EKG showed bradycardic sinus rhythm with non-specific ST-T changes.   - Echocardiogram (10/25/2024) showed normal LVEF 55-60% with no wall motion abnormalities.   - US duplex carotids:  Right Carotid: Findings are consistent with less than 50% stenosis of the right proximal internal carotid  artery. Laminar flow seen by color Doppler. Right external carotid artery appears patent with no evidence of stenosis. No evidence of hemodynamically significant stenosis of the right common carotid artery. The right vertebral artery is patent with antegrade flow. No evidence of hemodynamically significant stenosis in the right subclavian artery.  Left Carotid: Findings are consistent with less than 50% stenosis of the left proximal internal carotid artery. Laminar flow seen by color Doppler. Left external carotid artery appears patent with no evidence of stenosis. No evidence of hemodynamically significant stenosis of the left common carotid artery. The left vertebral artery is patent with antegrade flow. There are elevated velocities in the left subclavian artery that are suggestive of disease.  -7-day holter monitor:  *The predominant rhythm was sinus.  *The Maximum Heart Rate recorded was 170 bpm, 11/02 14:17:59, the Minimum Heart Rate recorded was 42bpm, 11/03 00:58:21, and the Average Heart Rate was 61 bpm.  *There were 131 VE beats with a burden of <1 %.  *There were 1,331 SVE beats with a burden of <1 %. There were 12 occurrences of Supraventricular Tachycardia with the Fastest episode 170 bpm, 11/02 14:17:58, and the Longest episode 15 beats, 10/29 05:52:33.  *Other rhythms in this study include: Ventricular Run.  *There was 1 Patient Trigger.  - Would suggest thyroid function assessment by PCP team.  The echocardiogram showed normal LVEF 55-60% with no wall motion abnormalities.   Left Heart Catheterization (12/12/2024):  Double vessel coronary artery disease  Patent stent to prox-mid LAD  Mid RCA 99% sub occluded lesion  Preserved LV systolic function  S/P Successful PCI using one drug-eluting stent (KELLY) 2.5/38mm (post-dil 2.75mm NC balloon) guided by IVUS  - Would suggest to keep conservative treatment and guideline-directed medical therapy (GDMT) for the remaining lesions.   - Cardiac rehab.   - Would  suggest to keep ASA, Clopidogrel, high intensity statins - Atorvastatin 40mg daily.  - Follow up in 6 months.     # Diabetes  - Counseled on healthy diet and regular exercises.  - Discussed need for weight loss and the benefits.   - Keep current medications including Metformin.     # Hyperlipidemia  - Controlled by PCP.  - Keep home medication with Atorvastatin 40mg daily.  - Counseled on healthy diet and regular exercise.      # Hypothyroidism  - Would suggest  - Keep Levothyroxine daily      # Nicotine dependence  - We had a thorough conversation with the patient (~3min) addressing the hazard risks for smoking, including but not limited to heart attack, stroke and cancer. He has stopped smoking one year ago.     We have discussed the most common side effects of the prescribed medications, indications, drug interactions, risks, complications, and alternatives of medications/therapeutics were explained and discussed. The patient has been requested to monitor closely for any untoward side effects or complications of medications. The patient has been strongly advised to be compliant with the recommendations, all the questions and concerns have been addressed. The patient has been also instructed to call, to return sooner or to go to the emergency department if symptoms persist or get worsen. The patient voiced understanding and denies any further questions at this time.      This note was transcribed using the Dragon Dictation system. There may be grammatical, punctuation, or verbiage errors that occur with voice recognition programs.     Counseling greater than 50% of visit regarding all cardiac issues.     Thank you, Dr. Rm, for allowing me to participate in the care of this patient. Please do not hesitate to contact me with any further questions or concerns.     Jerald Hill MD  Cardiology

## 2025-03-21 ENCOUNTER — APPOINTMENT (OUTPATIENT)
Dept: CARDIOLOGY | Facility: CLINIC | Age: 65
End: 2025-03-21
Payer: MEDICAID

## 2025-05-12 DIAGNOSIS — E78.2 MIXED HYPERLIPIDEMIA DUE TO TYPE 2 DIABETES MELLITUS (MULTI): ICD-10-CM

## 2025-05-12 DIAGNOSIS — E11.69 MIXED HYPERLIPIDEMIA DUE TO TYPE 2 DIABETES MELLITUS (MULTI): ICD-10-CM

## 2025-05-13 RX ORDER — ATORVASTATIN CALCIUM 40 MG/1
40 TABLET, FILM COATED ORAL NIGHTLY
Qty: 90 TABLET | Refills: 3 | Status: SHIPPED | OUTPATIENT
Start: 2025-05-13

## 2025-05-21 LAB
25(OH)D3+25(OH)D2 SERPL-MCNC: 34 NG/ML (ref 30–100)
ALBUMIN SERPL-MCNC: 4.5 G/DL (ref 3.6–5.1)
ALBUMIN/CREAT UR: 8 MG/G CREAT
ALP SERPL-CCNC: 89 U/L (ref 35–144)
ALT SERPL-CCNC: 16 U/L (ref 9–46)
ANION GAP SERPL CALCULATED.4IONS-SCNC: 8 MMOL/L (CALC) (ref 7–17)
AST SERPL-CCNC: 13 U/L (ref 10–35)
BASOPHILS # BLD AUTO: 43 CELLS/UL (ref 0–200)
BASOPHILS NFR BLD AUTO: 0.6 %
BILIRUB SERPL-MCNC: 0.8 MG/DL (ref 0.2–1.2)
BUN SERPL-MCNC: 16 MG/DL (ref 7–25)
CALCIUM SERPL-MCNC: 9.1 MG/DL (ref 8.6–10.3)
CHLORIDE SERPL-SCNC: 105 MMOL/L (ref 98–110)
CO2 SERPL-SCNC: 28 MMOL/L (ref 20–32)
CREAT SERPL-MCNC: 0.79 MG/DL (ref 0.7–1.35)
CREAT UR-MCNC: 85 MG/DL (ref 20–320)
EGFRCR SERPLBLD CKD-EPI 2021: 100 ML/MIN/1.73M2
EOSINOPHIL # BLD AUTO: 128 CELLS/UL (ref 15–500)
EOSINOPHIL NFR BLD AUTO: 1.8 %
ERYTHROCYTE [DISTWIDTH] IN BLOOD BY AUTOMATED COUNT: 13.6 % (ref 11–15)
EST. AVERAGE GLUCOSE BLD GHB EST-MCNC: 180 MG/DL
EST. AVERAGE GLUCOSE BLD GHB EST-SCNC: 10 MMOL/L
GLUCOSE SERPL-MCNC: 151 MG/DL (ref 65–99)
HBA1C MFR BLD: 7.9 %
HCT VFR BLD AUTO: 50.3 % (ref 38.5–50)
HGB BLD-MCNC: 16.2 G/DL (ref 13.2–17.1)
LYMPHOCYTES # BLD AUTO: 1953 CELLS/UL (ref 850–3900)
LYMPHOCYTES NFR BLD AUTO: 27.5 %
MCH RBC QN AUTO: 30.6 PG (ref 27–33)
MCHC RBC AUTO-ENTMCNC: 32.2 G/DL (ref 32–36)
MCV RBC AUTO: 94.9 FL (ref 80–100)
MICROALBUMIN UR-MCNC: 0.7 MG/DL
MONOCYTES # BLD AUTO: 497 CELLS/UL (ref 200–950)
MONOCYTES NFR BLD AUTO: 7 %
NEUTROPHILS # BLD AUTO: 4480 CELLS/UL (ref 1500–7800)
NEUTROPHILS NFR BLD AUTO: 63.1 %
PLATELET # BLD AUTO: 270 THOUSAND/UL (ref 140–400)
PMV BLD REES-ECKER: 9 FL (ref 7.5–12.5)
POTASSIUM SERPL-SCNC: 4.4 MMOL/L (ref 3.5–5.3)
PROT SERPL-MCNC: 6.6 G/DL (ref 6.1–8.1)
PTH-INTACT SERPL-MCNC: 35 PG/ML (ref 16–77)
RBC # BLD AUTO: 5.3 MILLION/UL (ref 4.2–5.8)
SODIUM SERPL-SCNC: 141 MMOL/L (ref 135–146)
T3FREE SERPL-MCNC: 2.7 PG/ML (ref 2.3–4.2)
T4 FREE SERPL-MCNC: 1.2 NG/DL (ref 0.8–1.8)
TSH SERPL-ACNC: 5.56 MIU/L (ref 0.4–4.5)
VIT B12 SERPL-MCNC: 392 PG/ML (ref 200–1100)
WBC # BLD AUTO: 7.1 THOUSAND/UL (ref 3.8–10.8)

## 2025-05-27 ENCOUNTER — APPOINTMENT (OUTPATIENT)
Dept: PRIMARY CARE | Facility: CLINIC | Age: 65
End: 2025-05-27
Payer: MEDICAID

## 2025-05-28 ENCOUNTER — APPOINTMENT (OUTPATIENT)
Dept: PRIMARY CARE | Facility: CLINIC | Age: 65
End: 2025-05-28
Payer: MEDICAID

## 2025-06-11 ENCOUNTER — APPOINTMENT (OUTPATIENT)
Dept: PRIMARY CARE | Facility: CLINIC | Age: 65
End: 2025-06-11
Payer: MEDICAID

## 2025-06-11 VITALS
HEART RATE: 56 BPM | WEIGHT: 191.6 LBS | DIASTOLIC BLOOD PRESSURE: 64 MMHG | HEIGHT: 69 IN | BODY MASS INDEX: 28.38 KG/M2 | SYSTOLIC BLOOD PRESSURE: 122 MMHG

## 2025-06-11 DIAGNOSIS — E03.9 HYPOTHYROIDISM, UNSPECIFIED TYPE: ICD-10-CM

## 2025-06-11 DIAGNOSIS — F31.9 BIPOLAR 1 DISORDER, DEPRESSED (MULTI): ICD-10-CM

## 2025-06-11 DIAGNOSIS — E78.2 MIXED HYPERLIPIDEMIA DUE TO TYPE 2 DIABETES MELLITUS (MULTI): ICD-10-CM

## 2025-06-11 DIAGNOSIS — J43.9 PULMONARY EMPHYSEMA, UNSPECIFIED EMPHYSEMA TYPE (MULTI): ICD-10-CM

## 2025-06-11 DIAGNOSIS — E83.51 HYPOCALCEMIA: ICD-10-CM

## 2025-06-11 DIAGNOSIS — F32.1 DEPRESSION, MAJOR, SINGLE EPISODE, MODERATE (MULTI): ICD-10-CM

## 2025-06-11 DIAGNOSIS — E53.8 VITAMIN B12 DEFICIENCY: ICD-10-CM

## 2025-06-11 DIAGNOSIS — Z12.5 SCREENING PSA (PROSTATE SPECIFIC ANTIGEN): ICD-10-CM

## 2025-06-11 DIAGNOSIS — E55.9 VITAMIN D DEFICIENCY: Primary | ICD-10-CM

## 2025-06-11 DIAGNOSIS — E11.69 MIXED HYPERLIPIDEMIA DUE TO TYPE 2 DIABETES MELLITUS (MULTI): ICD-10-CM

## 2025-06-11 PROCEDURE — 1036F TOBACCO NON-USER: CPT | Performed by: NURSE PRACTITIONER

## 2025-06-11 PROCEDURE — 3008F BODY MASS INDEX DOCD: CPT | Performed by: NURSE PRACTITIONER

## 2025-06-11 PROCEDURE — 3078F DIAST BP <80 MM HG: CPT | Performed by: NURSE PRACTITIONER

## 2025-06-11 PROCEDURE — 3074F SYST BP LT 130 MM HG: CPT | Performed by: NURSE PRACTITIONER

## 2025-06-11 PROCEDURE — 99214 OFFICE O/P EST MOD 30 MIN: CPT | Performed by: NURSE PRACTITIONER

## 2025-06-11 RX ORDER — BUPROPION HYDROCHLORIDE 300 MG/1
300 TABLET ORAL
COMMUNITY
Start: 2025-05-12

## 2025-06-11 RX ORDER — LEVOTHYROXINE SODIUM 50 UG/1
50 TABLET ORAL DAILY
Qty: 90 TABLET | Refills: 3 | Status: SHIPPED | OUTPATIENT
Start: 2025-06-11 | End: 2026-06-11

## 2025-06-11 ASSESSMENT — ENCOUNTER SYMPTOMS
SPEECH DIFFICULTY: 0
ABDOMINAL PAIN: 0
UNEXPECTED WEIGHT CHANGE: 0
HEMATURIA: 0
FACIAL ASYMMETRY: 0
CHOKING: 0
SLEEP DISTURBANCE: 0
SHORTNESS OF BREATH: 0
ABDOMINAL DISTENTION: 0
PHOTOPHOBIA: 0
FLANK PAIN: 0
WOUND: 0
BLOOD IN STOOL: 0
BACK PAIN: 0
ARTHRALGIAS: 0
CONSTIPATION: 0
SORE THROAT: 0
EYE PAIN: 0
DIZZINESS: 0
MYALGIAS: 0
NERVOUS/ANXIOUS: 0
EYE REDNESS: 0
COUGH: 0
FATIGUE: 0
CHEST TIGHTNESS: 0
PALPITATIONS: 0
FEVER: 0
CONFUSION: 0
APNEA: 0
DYSURIA: 0
TROUBLE SWALLOWING: 0
NAUSEA: 0
FREQUENCY: 0
CHILLS: 0
WEAKNESS: 0
DIFFICULTY URINATING: 0
SEIZURES: 0
DIARRHEA: 0
HEADACHES: 0
VOMITING: 0
NUMBNESS: 0
WHEEZING: 0
NECK PAIN: 0
JOINT SWELLING: 0

## 2025-06-11 ASSESSMENT — PATIENT HEALTH QUESTIONNAIRE - PHQ9
SUM OF ALL RESPONSES TO PHQ9 QUESTIONS 1 AND 2: 0
1. LITTLE INTEREST OR PLEASURE IN DOING THINGS: NOT AT ALL
2. FEELING DOWN, DEPRESSED OR HOPELESS: NOT AT ALL

## 2025-06-11 NOTE — PROGRESS NOTES
"Subjective   Patient ID: Naveed Brown is a 64 y.o. male who presents for Follow-up (6 MONTH FOLLOW UP, LABS).    HPI:  Presents today for 6 month with labs.  No new complaints     DM HGA1C 7.9%. INCREASE JARDIANCE TO 25 MG DAILY FROM 10 MG   THYROID- TSH 5.5.6. INCREASE LEVOTHYROXINE TO 50 MCG FROM 25 MCG DAILY. RECHECK IN 6 WEEKS. OBTAIN THYROID US  DEPRESSION- STABLE. FOLLOWING WITH [facility]. APPT WITH THEM NEXT WEEK  VIT D- STABLE  B12- STABLE  COPD- STABLE    Visit Vitals  /64   Pulse 56   Ht 1.753 m (5' 9\")   Wt 86.9 kg (191 lb 9.6 oz)   BMI 28.29 kg/m²   Smoking Status Former   BSA 2.06 m²        Review of Systems   Constitutional:  Negative for chills, fatigue, fever and unexpected weight change.   HENT:  Negative for congestion, ear pain, sore throat and trouble swallowing.    Eyes:  Negative for photophobia, pain, redness and visual disturbance.   Respiratory:  Negative for apnea, cough, choking, chest tightness, shortness of breath and wheezing.    Cardiovascular:  Negative for chest pain, palpitations and leg swelling.   Gastrointestinal:  Negative for abdominal distention, abdominal pain, blood in stool, constipation, diarrhea, nausea and vomiting.   Genitourinary:  Negative for difficulty urinating, dysuria, flank pain, frequency, hematuria and urgency.   Musculoskeletal:  Negative for arthralgias, back pain, gait problem, joint swelling, myalgias and neck pain.   Skin:  Negative for rash and wound.   Neurological:  Negative for dizziness, seizures, syncope, facial asymmetry, speech difficulty, weakness, numbness and headaches.   Psychiatric/Behavioral:  Negative for confusion, sleep disturbance and suicidal ideas. The patient is not nervous/anxious.        Objective   Component      Latest Ref Rng 5/20/2025   WHITE BLOOD CELL COUNT      3.8 - 10.8 Thousand/uL 7.1    RED BLOOD CELL COUNT      4.20 - 5.80 Million/uL 5.30    HEMOGLOBIN      13.2 - 17.1 g/dL 16.2    HEMATOCRIT      38.5 - " 50.0 % 50.3 (H)    MCV      80.0 - 100.0 fL 94.9    MCH      27.0 - 33.0 pg 30.6    MCHC      32.0 - 36.0 g/dL 32.2    RDW      11.0 - 15.0 % 13.6    PLATELET COUNT      140 - 400 Thousand/uL 270    MPV      7.5 - 12.5 fL 9.0    ABSOLUTE NEUTROPHILS      1,500 - 7,800 cells/uL 4,480    ABSOLUTE LYMPHOCYTES      850 - 3,900 cells/uL 1,953    ABSOLUTE MONOCYTES      200 - 950 cells/uL 497    ABSOLUTE EOSINOPHILS      15 - 500 cells/uL 128    ABSOLUTE BASOPHILS      0 - 200 cells/uL 43    NEUTROPHILS      % 63.1    LYMPHOCYTES      % 27.5    MONOCYTES      % 7.0    EOSINOPHILS      % 1.8    BASOPHILS      % 0.6    GLUCOSE      65 - 99 mg/dL 151 (H)    UREA NITROGEN (BUN)      7 - 25 mg/dL 16    CREATININE      0.70 - 1.35 mg/dL 0.79    EGFR      > OR = 60 mL/min/1.73m2 100    SODIUM      135 - 146 mmol/L 141    POTASSIUM      3.5 - 5.3 mmol/L 4.4    CHLORIDE      98 - 110 mmol/L 105    CARBON DIOXIDE      20 - 32 mmol/L 28    ELECTROLYTE BALANCE      7 - 17 mmol/L (calc) 8    CALCIUM      8.6 - 10.3 mg/dL 9.1    PROTEIN, TOTAL      6.1 - 8.1 g/dL 6.6    ALBUMIN      3.6 - 5.1 g/dL 4.5    BILIRUBIN, TOTAL      0.2 - 1.2 mg/dL 0.8    ALKALINE PHOSPHATASE      35 - 144 U/L 89    AST      10 - 35 U/L 13    ALT      9 - 46 U/L 16    CREATININE, RANDOM URINE      20 - 320 mg/dL 85    ALBUMIN, URINE      See Note: mg/dL 0.7    ALBUMIN/CREATININE RATIO, RANDOM URINE      <30 mg/g creat 8    HEMOGLOBIN A1c      <5.7 % 7.9 (H)    eAG (mg/dL)      mg/dL 180    eAG (mmol/L)      mmol/L 10.0    T4, FREE      0.8 - 1.8 ng/dL 1.2    TSH      0.40 - 4.50 mIU/L 5.56 (H)    VITAMIN B12      200 - 1,100 pg/mL 392    T3, FREE      2.3 - 4.2 pg/mL 2.7    PARATHYROID HORMONE, INTACT      16 - 77 pg/mL 35    VITAMIN D,25-OH,TOTAL,IA      30 - 100 ng/mL 34       Legend:  (H) High  Physical Exam  Constitutional:       Appearance: Normal appearance. He is normal weight.   HENT:      Head: Normocephalic.   Eyes:      Extraocular Movements:  Extraocular movements intact.      Conjunctiva/sclera: Conjunctivae normal.      Pupils: Pupils are equal, round, and reactive to light.   Cardiovascular:      Rate and Rhythm: Normal rate and regular rhythm.      Pulses: Normal pulses.      Heart sounds: Normal heart sounds.   Pulmonary:      Effort: Pulmonary effort is normal.      Breath sounds: Normal breath sounds.   Musculoskeletal:         General: Normal range of motion.      Cervical back: Normal range of motion.   Skin:     General: Skin is warm and dry.   Neurological:      General: No focal deficit present.      Mental Status: He is alert and oriented to person, place, and time.   Psychiatric:         Mood and Affect: Mood normal.         Behavior: Behavior normal.         Thought Content: Thought content normal.         Judgment: Judgment normal.            Assessment/Plan   Problem List Items Addressed This Visit       Depression, major, single episode, moderate (Multi)    Emphysema/COPD (Multi)    Hypocalcemia    Mixed hyperlipidemia due to type 2 diabetes mellitus (Multi)    Relevant Medications    empagliflozin (Jardiance) 25 mg tablet    Other Relevant Orders    Comprehensive Metabolic Panel    CBC and Auto Differential    Hemoglobin A1C    Lipid Panel    Comprehensive Metabolic Panel    Hemoglobin A1C    Hypothyroidism    Relevant Medications    levothyroxine (Synthroid, Levoxyl) 50 mcg tablet    Other Relevant Orders    Thyroid Stimulating Hormone    Thyroxine, Free    Triiodothyronine, Free    Thyroid Stimulating Hormone    Thyroxine, Free    Triiodothyronine, Free    US thyroid    Vitamin B12 deficiency    Relevant Orders    Vitamin B12    Vitamin D deficiency - Primary    Relevant Orders    Parathyroid Hormone, Intact    Vitamin D 25-Hydroxy,Total (for eval of Vitamin D levels)    Bipolar 1 disorder, depressed (Multi)     Other Visit Diagnoses         Screening PSA (prostate specific antigen)        Relevant Orders    Prostate Specific  Antigen, Screen          WE DISCUSSED MOST COMMON SIDE EFFECTS OF PRESCRIBED MEDICATIONS. INDICATIONS, RISK, COMPLICATIONS, AND ALTERNATIVES OF MEDICATION/THERAPEUTICS WERE EXPLAINED AND DISCUSSED. PLEASE MONITOR CLOSELY FOR ANY UNTOWARD SIDE EFFECTS OR COMPLICATIONS OF MEDICATIONS. PATIENT IS STRONGLY ADVISED TO BE COMPLIANT WITH RECOMMENDATIONS. QUESTIONS AND CONCERNS WERE ADDRESSED. INSTRUCTED TO CALL, RETURN SOONER, OR GO TO THE ER,  IF SYMPTOMS PERSIST OR WORSEN. THEY VOICED UNDERSTANDING AND  DENIES FURTHER QUESTIONS AT THIS TIME.    TIME CODE  1. PREPARATION FOR PATIENT'S VISIT (REVIEWING CHART, CURRENT MEDICAL RECORDS, OUTSIDE HEALTH PROVIDER RECORDS, PREVIOUS HISTORY, EXAM, TEST, PROCEDURE, AND MEDICATIONS)  2. FACE TO FACE ENCOUNTER OBTAINING HISTORY FROM THE PATIENT/FAMILY/CAREGIVERS; PERFORMING EVALUATION AND EXAMINATION; ORDERING TESTS OR PROCEDURES; REFERRING AND COMMUNICATING WITH OTHER HEALTHCARE PROVIDERS; COUNSELING AND EDUCATION OF THE PATIENT/FAMILY/CAREGIVERS; INDEPENDENTLY INTERPRETING RESULTS (TESTS, LABS, PROCEDURES, IMAGING) AND COMMUNICATING AND EXPLAINING RESULTS TO THE PATIENT/FAMILY/CAREGIVERS  3. COORDINATION OF CARE; PREPARING AND PRINTING DISCHARGE INSTRUCTIONS AND ANY EDUCATIONAL MATERIAL FOR THE PATIENT/FAMILY/CAREGIVERS. DOCUMENTING CLINICAL INFORMATION IN THE ELECTRONIC MEDICAL RECORD   4. REVIEWING OARRS AS NEEDED    MDM  1) COMPLEXITY: MORE THAN 1 STABLE CHRONIC CONDITION ADDRESSED OR 1 ACUTE ILLNESS ADDRESSED   2)DATA: TESTS INTERPRETED AND OR ORDERED, TOOK INDEPENDENT HISTORY OR RECORDS REVIEWED  3)RISK: MODERATE RISK DUE TO NATURE OF MEDICAL CONDITIONS/COMORBIDITY OR MEDICATIONS ORDERED OR SURGICAL OR PROCEDURE REFERRAL    3 MONTHS WITH LABS    AND     6 MONTHS WITH LABS

## 2025-06-13 ENCOUNTER — HOSPITAL ENCOUNTER (OUTPATIENT)
Dept: RADIOLOGY | Facility: HOSPITAL | Age: 65
Discharge: HOME | End: 2025-06-13
Payer: MEDICAID

## 2025-06-13 DIAGNOSIS — E03.9 HYPOTHYROIDISM, UNSPECIFIED TYPE: ICD-10-CM

## 2025-06-13 PROCEDURE — 76536 US EXAM OF HEAD AND NECK: CPT | Performed by: RADIOLOGY

## 2025-06-13 PROCEDURE — 76536 US EXAM OF HEAD AND NECK: CPT

## 2025-08-26 DIAGNOSIS — F32.1 DEPRESSION, MAJOR, SINGLE EPISODE, MODERATE (MULTI): ICD-10-CM

## 2025-08-26 DIAGNOSIS — F41.9 ANXIETY: ICD-10-CM

## 2025-08-26 RX ORDER — ARIPIPRAZOLE 20 MG/1
20 TABLET ORAL DAILY
Qty: 90 TABLET | Refills: 1 | Status: SHIPPED | OUTPATIENT
Start: 2025-08-26

## 2025-09-11 ENCOUNTER — APPOINTMENT (OUTPATIENT)
Dept: PRIMARY CARE | Facility: CLINIC | Age: 65
End: 2025-09-11
Payer: MEDICAID

## 2025-09-19 ENCOUNTER — APPOINTMENT (OUTPATIENT)
Dept: CARDIOLOGY | Facility: CLINIC | Age: 65
End: 2025-09-19
Payer: MEDICAID

## 2025-12-16 ENCOUNTER — APPOINTMENT (OUTPATIENT)
Dept: PRIMARY CARE | Facility: CLINIC | Age: 65
End: 2025-12-16
Payer: MEDICAID

## (undated) DEVICE — DEVICE KIT, INFLATION, CUSTOM, PARMA

## (undated) DEVICE — CATHETER, BALLOON DILATION, EUPHORA SEMICOMPLIANT 2.50  X 20 MM X 142CM

## (undated) DEVICE — CATHETER, GUIDING, LAUNCHER, 6FR, JR 4.0

## (undated) DEVICE — SHEATH, GLIDESHEATH, SLENDER, 6FR 10CM

## (undated) DEVICE — PACK, ANGIO FEMORAL, CUSTOM, SMC

## (undated) DEVICE — GUIDEWIRE, UNIVERSAL BALANCE MID WEIGHT, 190CM, STR

## (undated) DEVICE — CATHETER, OPTITORQUE, 5FR, TIG, 1H/100CM

## (undated) DEVICE — GUIDEWIRE, INQWIRE, 3MM J, .035 X 210CM, FIXED

## (undated) DEVICE — TR BAND, RADIAL COMPRESSION, STANDARD, 24CM

## (undated) DEVICE — KIT, NAMIC STANDARD, LEFT HEART, W/ INTEGRATED COMP MANIFOLD

## (undated) DEVICE — CATHETER, IVUS, OPTICROSS HD BAGLESS, NON-CE, 6FR

## (undated) DEVICE — ELECTRODE, QUICK-PACE, EDGE RTS, COMBO

## (undated) DEVICE — VALVE, HEMOSTASIS, GUARDIAN II NC, W/ GUIDEWIRE INSERTION TOOL

## (undated) DEVICE — BAG, PERMANENT SLED , FOR IVUS CATHETER

## (undated) DEVICE — CATHETER, BALLOON, NC EUPHORA NONCOMPLIANT 2.75 X 15 X 142CM